# Patient Record
Sex: FEMALE | Race: WHITE | NOT HISPANIC OR LATINO | Employment: OTHER | URBAN - METROPOLITAN AREA
[De-identification: names, ages, dates, MRNs, and addresses within clinical notes are randomized per-mention and may not be internally consistent; named-entity substitution may affect disease eponyms.]

---

## 2017-01-06 RX ORDER — GABAPENTIN 300 MG/1
CAPSULE ORAL
Qty: 270 CAPSULE | Refills: 0 | Status: SHIPPED | OUTPATIENT
Start: 2017-01-06 | End: 2017-04-08 | Stop reason: SDUPTHER

## 2017-01-26 DIAGNOSIS — E79.0 HYPERURICEMIA: ICD-10-CM

## 2017-01-26 DIAGNOSIS — E03.8 OTHER SPECIFIED HYPOTHYROIDISM: ICD-10-CM

## 2017-01-26 DIAGNOSIS — E78.49 OTHER HYPERLIPIDEMIA: Primary | ICD-10-CM

## 2017-01-26 DIAGNOSIS — F41.9 ANXIETY: ICD-10-CM

## 2017-01-26 DIAGNOSIS — E55.9 VITAMIN D DEFICIENCY: Primary | ICD-10-CM

## 2017-01-26 DIAGNOSIS — IMO0002 UNCONTROLLED TYPE 2 DIABETES MELLITUS WITH OTHER SPECIFIED COMPLICATION: ICD-10-CM

## 2017-01-26 DIAGNOSIS — E11.69 TYPE 2 DIABETES MELLITUS WITH OTHER SPECIFIED COMPLICATION (HCC): ICD-10-CM

## 2017-01-26 DIAGNOSIS — R79.89 LFT ELEVATION: ICD-10-CM

## 2017-02-02 ENCOUNTER — CONVERSION ENCOUNTER (OUTPATIENT)
Dept: ENDOCRINOLOGY | Age: 67
End: 2017-02-02

## 2017-02-02 ENCOUNTER — LAB (OUTPATIENT)
Dept: ENDOCRINOLOGY | Age: 67
End: 2017-02-02

## 2017-02-02 DIAGNOSIS — F41.9 ANXIETY: ICD-10-CM

## 2017-02-02 DIAGNOSIS — E03.8 OTHER SPECIFIED HYPOTHYROIDISM: ICD-10-CM

## 2017-02-02 DIAGNOSIS — IMO0002 UNCONTROLLED TYPE 2 DIABETES MELLITUS WITH OTHER SPECIFIED COMPLICATION: ICD-10-CM

## 2017-02-02 DIAGNOSIS — R79.89 LFT ELEVATION: ICD-10-CM

## 2017-02-02 DIAGNOSIS — E11.69 TYPE 2 DIABETES MELLITUS WITH OTHER SPECIFIED COMPLICATION (HCC): ICD-10-CM

## 2017-02-02 DIAGNOSIS — E78.49 OTHER HYPERLIPIDEMIA: ICD-10-CM

## 2017-02-02 DIAGNOSIS — E55.9 VITAMIN D DEFICIENCY: ICD-10-CM

## 2017-02-02 DIAGNOSIS — E79.0 HYPERURICEMIA: ICD-10-CM

## 2017-02-03 LAB
25(OH)D3+25(OH)D2 SERPL-MCNC: 35.4 NG/ML (ref 30–100)
MICROALBUMIN UR-MCNC: 15.3 UG/ML

## 2017-02-04 LAB
ALBUMIN SERPL-MCNC: 4.8 G/DL (ref 3.5–5.2)
ALBUMIN/GLOB SERPL: 1.8 G/DL
ALP SERPL-CCNC: 92 U/L (ref 39–117)
ALT SERPL-CCNC: 40 U/L (ref 1–33)
AST SERPL-CCNC: 27 U/L (ref 1–32)
BILIRUB SERPL-MCNC: 0.5 MG/DL (ref 0.1–1.2)
BUN SERPL-MCNC: 12 MG/DL (ref 8–23)
BUN/CREAT SERPL: 20.7 (ref 7–25)
C PEPTIDE SERPL-MCNC: 8.2 NG/ML (ref 1.1–4.4)
CALCIUM SERPL-MCNC: 9.7 MG/DL (ref 8.6–10.5)
CHLORIDE SERPL-SCNC: 102 MMOL/L (ref 98–107)
CHOLEST SERPL-MCNC: 209 MG/DL (ref 0–200)
CO2 SERPL-SCNC: 29.3 MMOL/L (ref 22–29)
CREAT SERPL-MCNC: 0.58 MG/DL (ref 0.57–1)
GLOBULIN SER CALC-MCNC: 2.6 GM/DL
GLUCOSE SERPL-MCNC: 131 MG/DL (ref 65–99)
HBA1C MFR BLD: 6.13 % (ref 4.8–5.6)
HDLC SERPL-MCNC: 47 MG/DL (ref 40–60)
LDLC SERPL CALC-MCNC: 116 MG/DL (ref 0–100)
POTASSIUM SERPL-SCNC: 4.2 MMOL/L (ref 3.5–5.2)
PROT SERPL-MCNC: 7.4 G/DL (ref 6–8.5)
SODIUM SERPL-SCNC: 143 MMOL/L (ref 136–145)
T3FREE SERPL-MCNC: 3.7 PG/ML (ref 2–4.4)
T4 FREE SERPL-MCNC: 1.71 NG/DL (ref 0.93–1.7)
T4 SERPL-MCNC: 9.81 MCG/DL (ref 4.5–11.7)
THYROGLOB AB SERPL-ACNC: <1 IU/ML
THYROGLOB SERPL-MCNC: 7.6 NG/ML
THYROGLOB SERPL-MCNC: NORMAL NG/ML
TRIGL SERPL-MCNC: 228 MG/DL (ref 0–150)
TSH SERPL DL<=0.005 MIU/L-ACNC: 0.38 MIU/ML (ref 0.27–4.2)
URATE SERPL-MCNC: 6.8 MG/DL (ref 2.4–5.7)
VLDLC SERPL CALC-MCNC: 45.6 MG/DL (ref 5–40)

## 2017-02-08 RX ORDER — DULOXETIN HYDROCHLORIDE 60 MG/1
CAPSULE, DELAYED RELEASE ORAL
Qty: 90 CAPSULE | Refills: 0 | Status: SHIPPED | OUTPATIENT
Start: 2017-02-08 | End: 2017-05-11 | Stop reason: SDUPTHER

## 2017-02-08 RX ORDER — LEVOTHYROXINE SODIUM 125 MCG
TABLET ORAL
Qty: 90 TABLET | Refills: 0 | Status: SHIPPED | OUTPATIENT
Start: 2017-02-08 | End: 2017-05-18 | Stop reason: SDUPTHER

## 2017-02-14 ENCOUNTER — OFFICE VISIT (OUTPATIENT)
Dept: ENDOCRINOLOGY | Age: 67
End: 2017-02-14

## 2017-02-14 VITALS
SYSTOLIC BLOOD PRESSURE: 128 MMHG | BODY MASS INDEX: 32.82 KG/M2 | DIASTOLIC BLOOD PRESSURE: 86 MMHG | RESPIRATION RATE: 16 BRPM | HEIGHT: 65 IN | WEIGHT: 197 LBS

## 2017-02-14 DIAGNOSIS — R53.82 CHRONIC FATIGUE: ICD-10-CM

## 2017-02-14 DIAGNOSIS — IMO0002 UNCONTROLLED TYPE 2 DIABETES MELLITUS WITH OTHER SPECIFIED COMPLICATION: Primary | ICD-10-CM

## 2017-02-14 DIAGNOSIS — E79.0 HYPERURICEMIA: ICD-10-CM

## 2017-02-14 DIAGNOSIS — E55.9 VITAMIN D DEFICIENCY: ICD-10-CM

## 2017-02-14 DIAGNOSIS — E78.49 OTHER HYPERLIPIDEMIA: ICD-10-CM

## 2017-02-14 DIAGNOSIS — I10 ESSENTIAL HYPERTENSION: ICD-10-CM

## 2017-02-14 PROCEDURE — 99214 OFFICE O/P EST MOD 30 MIN: CPT | Performed by: INTERNAL MEDICINE

## 2017-02-14 RX ORDER — ATORVASTATIN CALCIUM 20 MG/1
80 TABLET, FILM COATED ORAL DAILY
COMMUNITY
End: 2017-07-13

## 2017-02-14 RX ORDER — ALLOPURINOL 100 MG/1
100 TABLET ORAL DAILY
Qty: 30 TABLET | Refills: 5 | Status: SHIPPED | OUTPATIENT
Start: 2017-02-14 | End: 2017-07-13 | Stop reason: SDUPTHER

## 2017-02-14 RX ORDER — ERGOCALCIFEROL 1.25 MG/1
50000 CAPSULE ORAL
Qty: 13 CAPSULE | Refills: 3 | Status: SHIPPED | OUTPATIENT
Start: 2017-02-14 | End: 2018-03-04 | Stop reason: SDUPTHER

## 2017-02-14 NOTE — PROGRESS NOTES
"Subjective   Jose Keys is a 66 y.o. female seen for follow up for DM2, hyperlipidemia, hypothyroidism, vit d deficiency, lab review. Patient is checking BG only as needed. She denies any problems or concerns.     History of Present Illness 's is a 66-year-old female known patient with type II diabetes hypertension dyslipidemia hypothyroidism as well as vitamin D deficiency.  Over the course of last 6 months she has had no significant health problems for which to go to the emergency room or hospital.  Visit Vitals   • /86   • Resp 16   • Ht 65\" (165.1 cm)   • Wt 197 lb (89.4 kg)   • BMI 32.78 kg/m2     Allergies   Allergen Reactions   • Azithromycin    • Erythromycin    • Ezetimibe-Simvastatin    • Hydrocodone-Acetaminophen    • Ropinirole Hcl    • Simvastatin    • Statins    • Sulfur    • Zetia [Ezetimibe]        Current Outpatient Prescriptions:   •  amLODIPine-valsartan (EXFORGE) 5-320 MG per tablet, Take 1 tablet by mouth daily., Disp: , Rfl:   •  atorvastatin (LIPITOR) 20 MG tablet, Take 80 mg by mouth Daily., Disp: , Rfl:   •  DULoxetine (CYMBALTA) 60 MG capsule, TAKE ONE CAPSULE BY MOUTH EVERY DAY, Disp: 90 capsule, Rfl: 0  •  Fenofibrate (LIPOFEN) 150 MG capsule, Take 150 mg by mouth daily., Disp: , Rfl:   •  gabapentin (NEURONTIN) 300 MG capsule, TAKE 1 CAPSULE BY MOUTH THREE TIMES DAILY, Disp: 270 capsule, Rfl: 0  •  LEVOCETIRIZINE DIHYDROCHLORIDE PO, Take 5 mg by mouth daily., Disp: , Rfl:   •  metFORMIN (GLUCOPHAGE) 1000 MG tablet, Take 1 tablet by mouth 2 (two) times a day with meals., Disp: 60 tablet, Rfl: 0  •  Multiple Vitamins-Minerals (MULTIVITAMIN PO), Take 1 tablet by mouth daily., Disp: , Rfl:   •  Multiple Vitamins-Minerals (OCUVITE PO), Take 1 tablet by mouth daily., Disp: , Rfl:   •  SYNTHROID 125 MCG tablet, TAKE 1 TABLET DAILY AS DIRECTED, Disp: 90 tablet, Rfl: 0  •  vitamin D (ERGOCALCIFEROL) 73101 UNITS capsule capsule, Take 1 capsule by mouth every 7 days., Disp: 13 capsule, Rfl: " 3          The following portions of the patient's history were reviewed and updated as appropriate: allergies, current medications, past family history, past medical history, past social history, past surgical history and problem list.    Review of Systems   Constitutional: Negative.    HENT: Negative.    Eyes: Negative.    Respiratory: Negative.    Cardiovascular: Negative.    Gastrointestinal: Negative.    Endocrine: Negative.    Genitourinary: Negative.    Musculoskeletal: Negative.    Skin: Negative.    Allergic/Immunologic: Negative.    Neurological: Negative.    Hematological: Negative.    Psychiatric/Behavioral: Negative.        Objective   Physical Exam   Constitutional: She is oriented to person, place, and time. She appears well-developed and well-nourished. No distress.   HENT:   Head: Normocephalic and atraumatic.   Right Ear: External ear normal.   Left Ear: External ear normal.   Nose: Nose normal.   Mouth/Throat: Oropharynx is clear and moist. No oropharyngeal exudate.   Eyes: Conjunctivae and EOM are normal. Pupils are equal, round, and reactive to light. Right eye exhibits no discharge. Left eye exhibits no discharge. No scleral icterus.   Neck: Trachea normal, normal range of motion and full passive range of motion without pain. Neck supple. No JVD present. No tracheal tenderness present. Carotid bruit is not present. No tracheal deviation, no edema and no erythema present. No thyroid mass and no thyromegaly present.   Cardiovascular: Normal rate, regular rhythm, normal heart sounds and intact distal pulses.  Exam reveals no gallop and no friction rub.    No murmur heard.  Pulmonary/Chest: Effort normal and breath sounds normal. No stridor. No respiratory distress. She has no wheezes. She has no rales. She exhibits no tenderness.   Abdominal: Soft. Bowel sounds are normal. She exhibits no distension and no mass. There is no tenderness. There is no rebound and no guarding. No hernia.    Musculoskeletal: Normal range of motion. She exhibits no edema, tenderness or deformity.   Lymphadenopathy:     She has no cervical adenopathy.   Neurological: She is alert and oriented to person, place, and time. She has normal reflexes. She displays normal reflexes. No cranial nerve deficit. She exhibits normal muscle tone. Coordination normal.   Skin: Skin is warm and dry. No rash noted. She is not diaphoretic. No erythema. No pallor.   Psychiatric: She has a normal mood and affect. Her behavior is normal. Judgment and thought content normal.   Nursing note and vitals reviewed.     Results for orders placed or performed in visit on 02/02/17   Lipid Panel   Result Value Ref Range    Total Cholesterol 209 (H) 0 - 200 mg/dL    Triglycerides 228 (H) 0 - 150 mg/dL    HDL Cholesterol 47 40 - 60 mg/dL    VLDL Cholesterol 45.6 (H) 5 - 40 mg/dL    LDL Cholesterol  116 (H) 0 - 100 mg/dL   T4, Free   Result Value Ref Range    Free T4 1.71 (H) 0.93 - 1.70 ng/dL   T4 & TSH ([facility])   Result Value Ref Range    TSH 0.383 0.270 - 4.200 mIU/mL    T4, Total 9.81 4.50 - 11.70 mcg/dL   T3, Free   Result Value Ref Range    T3, Free 3.7 2.0 - 4.4 pg/mL   Comprehensive Metabolic Panel   Result Value Ref Range    Glucose 131 (H) 65 - 99 mg/dL    BUN 12 8 - 23 mg/dL    Creatinine 0.58 0.57 - 1.00 mg/dL    eGFR Non African Am 104 >60 mL/min/1.73    eGFR African Am 126 >60 mL/min/1.73    BUN/Creatinine Ratio 20.7 7.0 - 25.0    Sodium 143 136 - 145 mmol/L    Potassium 4.2 3.5 - 5.2 mmol/L    Chloride 102 98 - 107 mmol/L    Total CO2 29.3 (H) 22.0 - 29.0 mmol/L    Calcium 9.7 8.6 - 10.5 mg/dL    Total Protein 7.4 6.0 - 8.5 g/dL    Albumin 4.80 3.50 - 5.20 g/dL    Globulin 2.6 gm/dL    A/G Ratio 1.8 g/dL    Total Bilirubin 0.5 0.1 - 1.2 mg/dL    Alkaline Phosphatase 92 39 - 117 U/L    AST (SGOT) 27 1 - 32 U/L    ALT (SGPT) 40 (H) 1 - 33 U/L   C-Peptide   Result Value Ref Range    C-Peptide 8.2 (H) 1.1 - 4.4 ng/mL   Hemoglobin A1c   Result  Value Ref Range    Hemoglobin A1C 6.13 (H) 4.80 - 5.60 %   Uric Acid   Result Value Ref Range    Uric Acid 6.8 (H) 2.4 - 5.7 mg/dL   Comprehensive Thyroglobulin   Result Value Ref Range    Thyroglobulin Ab <1.0 IU/mL    Thyroglobulin 7.6 ng/mL    Thyroglobulin (TG-HARSH) Comment ng/mL           Assessment/Plan   Diagnoses and all orders for this visit:    Uncontrolled type 2 diabetes mellitus with other specified complication  -     T3, Free; Future  -     T4 & TSH ([facility]); Future  -     T4, Free; Future  -     Thyroglobulin With Anti-TG; Future  -     Uric Acid; Future  -     Vitamin D 25 Hydroxy; Future  -     Comprehensive Metabolic Panel; Future  -     C-Peptide; Future  -     Hemoglobin A1c; Future  -     Lipid Panel; Future  -     MicroAlbumin, Urine, Random; Future    Other hyperlipidemia  -     T3, Free; Future  -     T4 & TSH ([facility]); Future  -     T4, Free; Future  -     Thyroglobulin With Anti-TG; Future  -     Uric Acid; Future  -     Vitamin D 25 Hydroxy; Future  -     Comprehensive Metabolic Panel; Future  -     C-Peptide; Future  -     Hemoglobin A1c; Future  -     Lipid Panel; Future  -     MicroAlbumin, Urine, Random; Future    Essential hypertension  -     T3, Free; Future  -     T4 & TSH ([facility]); Future  -     T4, Free; Future  -     Thyroglobulin With Anti-TG; Future  -     Uric Acid; Future  -     Vitamin D 25 Hydroxy; Future  -     Comprehensive Metabolic Panel; Future  -     C-Peptide; Future  -     Hemoglobin A1c; Future  -     Lipid Panel; Future  -     MicroAlbumin, Urine, Random; Future    Vitamin D deficiency  -     T3, Free; Future  -     T4 & TSH ([facility]); Future  -     T4, Free; Future  -     Thyroglobulin With Anti-TG; Future  -     Uric Acid; Future  -     Vitamin D 25 Hydroxy; Future  -     Comprehensive Metabolic Panel; Future  -     C-Peptide; Future  -     Hemoglobin A1c; Future  -     Lipid Panel; Future  -     MicroAlbumin, Urine, Random; Future    Hyperuricemia  -     T3,  Free; Future  -     T4 & TSH ([facility]); Future  -     T4, Free; Future  -     Thyroglobulin With Anti-TG; Future  -     Uric Acid; Future  -     Vitamin D 25 Hydroxy; Future  -     Comprehensive Metabolic Panel; Future  -     C-Peptide; Future  -     Hemoglobin A1c; Future  -     Lipid Panel; Future  -     MicroAlbumin, Urine, Random; Future    Chronic fatigue  -     T3, Free; Future  -     T4 & TSH ([facility]); Future  -     T4, Free; Future  -     Thyroglobulin With Anti-TG; Future  -     Uric Acid; Future  -     Vitamin D 25 Hydroxy; Future  -     Comprehensive Metabolic Panel; Future  -     C-Peptide; Future  -     Hemoglobin A1c; Future  -     Lipid Panel; Future  -     MicroAlbumin, Urine, Random; Future    Other orders  -     allopurinol (ZYLOPRIM) 100 MG tablet; Take 1 tablet by mouth Daily.  -     vitamin D (ERGOCALCIFEROL) 26313 UNITS capsule capsule; Take 1 capsule by mouth Every 7 (Seven) Days.              his summary I saw and examined this 66-year-old female for above-mentioned problems.  I reviewed her laboratory evaluation of 02/02/2017 and provided her with a hard copy of it.  She is clinically and metabolically stable and therefore we will go ahead and continue all her current prescriptions.  She will see Ms. Analisa Shaikh in 4 months with laboratory evaluation prior to that office visit.

## 2017-02-25 ENCOUNTER — OFFICE VISIT (OUTPATIENT)
Dept: RETAIL CLINIC | Facility: CLINIC | Age: 67
End: 2017-02-25

## 2017-02-25 VITALS
RESPIRATION RATE: 18 BRPM | OXYGEN SATURATION: 98 % | TEMPERATURE: 98 F | SYSTOLIC BLOOD PRESSURE: 124 MMHG | HEART RATE: 100 BPM | DIASTOLIC BLOOD PRESSURE: 70 MMHG

## 2017-02-25 DIAGNOSIS — R50.9 FEVER, UNSPECIFIED FEVER CAUSE: Primary | ICD-10-CM

## 2017-02-25 DIAGNOSIS — J40 BRONCHITIS: ICD-10-CM

## 2017-02-25 LAB
EXPIRATION DATE: NORMAL
FLUAV AG NPH QL: NORMAL
FLUBV AG NPH QL: NORMAL
INTERNAL CONTROL: NORMAL
Lab: NORMAL

## 2017-02-25 PROCEDURE — 99213 OFFICE O/P EST LOW 20 MIN: CPT | Performed by: NURSE PRACTITIONER

## 2017-02-25 PROCEDURE — 87804 INFLUENZA ASSAY W/OPTIC: CPT | Performed by: NURSE PRACTITIONER

## 2017-02-25 RX ORDER — DOXYCYCLINE 100 MG/1
100 CAPSULE ORAL 2 TIMES DAILY
Qty: 10 CAPSULE | Refills: 0 | Status: SHIPPED | OUTPATIENT
Start: 2017-02-25 | End: 2017-04-24

## 2017-02-25 RX ORDER — ALBUTEROL SULFATE 90 UG/1
2 AEROSOL, METERED RESPIRATORY (INHALATION) EVERY 4 HOURS PRN
Qty: 1 INHALER | Refills: 0 | Status: SHIPPED | OUTPATIENT
Start: 2017-02-25 | End: 2017-03-07

## 2017-02-25 RX ORDER — PREDNISONE 20 MG/1
20 TABLET ORAL 2 TIMES DAILY
Qty: 10 TABLET | Refills: 0 | Status: SHIPPED | OUTPATIENT
Start: 2017-02-25 | End: 2017-04-24

## 2017-02-25 RX ORDER — BENZONATATE 100 MG/1
CAPSULE ORAL
Qty: 30 CAPSULE | Refills: 0 | Status: SHIPPED | OUTPATIENT
Start: 2017-02-25 | End: 2017-04-24

## 2017-02-25 NOTE — PATIENT INSTRUCTIONS
Acute Bronchitis  Bronchitis is inflammation of the airways that extend from the windpipe into the lungs (bronchi). The inflammation often causes mucus to develop. This leads to a cough, which is the most common symptom of bronchitis.   In acute bronchitis, the condition usually develops suddenly and goes away over time, usually in a couple weeks. Smoking, allergies, and asthma can make bronchitis worse. Repeated episodes of bronchitis may cause further lung problems.   CAUSES  Acute bronchitis is most often caused by the same virus that causes a cold. The virus can spread from person to person (contagious) through coughing, sneezing, and touching contaminated objects.  SIGNS AND SYMPTOMS   · Cough.    · Fever.    · Coughing up mucus.    · Body aches.    · Chest congestion.    · Chills.    · Shortness of breath.    · Sore throat.    DIAGNOSIS   Acute bronchitis is usually diagnosed through a physical exam. Your health care provider will also ask you questions about your medical history. Tests, such as chest X-rays, are sometimes done to rule out other conditions.   TREATMENT   Acute bronchitis usually goes away in a couple weeks. Oftentimes, no medical treatment is necessary. Medicines are sometimes given for relief of fever or cough. Antibiotic medicines are usually not needed but may be prescribed in certain situations. In some cases, an inhaler may be recommended to help reduce shortness of breath and control the cough. A cool mist vaporizer may also be used to help thin bronchial secretions and make it easier to clear the chest.   HOME CARE INSTRUCTIONS  · Get plenty of rest.    · Drink enough fluids to keep your urine clear or pale yellow (unless you have a medical condition that requires fluid restriction). Increasing fluids may help thin your respiratory secretions (sputum) and reduce chest congestion, and it will prevent dehydration.    · Take medicines only as directed by your health care provider.  · If  you were prescribed an antibiotic medicine, finish it all even if you start to feel better.  · Avoid smoking and secondhand smoke. Exposure to cigarette smoke or irritating chemicals will make bronchitis worse. If you are a smoker, consider using nicotine gum or skin patches to help control withdrawal symptoms. Quitting smoking will help your lungs heal faster.    · Reduce the chances of another bout of acute bronchitis by washing your hands frequently, avoiding people with cold symptoms, and trying not to touch your hands to your mouth, nose, or eyes.    · Keep all follow-up visits as directed by your health care provider.    SEEK MEDICAL CARE IF:  Your symptoms do not improve after 1 week of treatment.   SEEK IMMEDIATE MEDICAL CARE IF:  · You develop an increased fever or chills.    · You have chest pain.    · You have severe shortness of breath.  · You have bloody sputum.    · You develop dehydration.  · You faint or repeatedly feel like you are going to pass out.  · You develop repeated vomiting.  · You develop a severe headache.  MAKE SURE YOU:   · Understand these instructions.  · Will watch your condition.  · Will get help right away if you are not doing well or get worse.     This information is not intended to replace advice given to you by your health care provider. Make sure you discuss any questions you have with your health care provider.     Document Released: 01/25/2006 Document Revised: 01/08/2016 Document Reviewed: 06/10/2014  [employer] Patient Education ©2016 [employer]

## 2017-02-25 NOTE — PROGRESS NOTES
Subjective:     Jose Keys is a 66 y.o.     Influenza   This is a new problem. The current episode started 1 to 4 weeks ago. Associated symptoms include chills, coughing, diaphoresis, fatigue, a fever, headaches, myalgias and a sore throat. Pertinent negatives include no congestion, nausea or vomiting. She has tried drinking (benadryl, mucinex, tylenol) for the symptoms. Improvement on treatment: minimal.         The following portions of the patient's history were reviewed and updated as appropriate: allergies, current medications, past family history, past medical history, past social history, past surgical history and problem list.      Review of Systems   Constitutional: Positive for chills, diaphoresis, fatigue and fever.   HENT: Positive for sore throat. Negative for congestion.    Respiratory: Positive for cough, shortness of breath and wheezing.    Cardiovascular:        Hx: hypertension   Gastrointestinal: Positive for diarrhea. Negative for nausea and vomiting.   Musculoskeletal: Positive for myalgias.   Skin: Negative for color change and pallor.   Allergic/Immunologic: Negative for environmental allergies.   Neurological: Positive for dizziness (mild), light-headedness (mild) and headaches.         Objective:      Physical Exam   Constitutional: She is oriented to person, place, and time. She appears well-developed and well-nourished. She is cooperative.   Coughing intermittently throughout visit   HENT:   Head: Normocephalic and atraumatic.   Right Ear: Tympanic membrane and ear canal normal.   Left Ear: Tympanic membrane and ear canal normal.   Nose: Nose normal.   Mouth/Throat: No oropharyngeal exudate or posterior oropharyngeal erythema.   Eyes: EOM and lids are normal. Pupils are equal, round, and reactive to light. Right eye conjunctiva injected: mild. Left eye conjunctiva injected: mild.   Neck: Normal range of motion. Neck supple.   Cardiovascular: Normal rate, regular rhythm, S1 normal, S2  normal and normal heart sounds.    Pulmonary/Chest: Effort normal. Decreased breath sounds: mildly decreased breath sounds. She has rhonchi (clear with cough) in the right upper field and the left upper field.   Abdominal: Soft. Normal appearance and bowel sounds are normal. There is no tenderness.   Musculoskeletal: Normal range of motion.   Lymphadenopathy:     She has no cervical adenopathy.   Neurological: She is alert and oriented to person, place, and time.   Skin: Skin is warm, dry and intact.   Psychiatric: She has a normal mood and affect. Her speech is normal and behavior is normal.   Vitals reviewed.          Jose was seen today for influenza.    Diagnoses and all orders for this visit:    Fever, unspecified fever cause  -     POC Influenza A / B    Bronchitis    Other orders  -     doxycycline (MONODOX) 100 MG capsule; Take 1 capsule by mouth 2 (Two) Times a Day.  -     benzonatate (TESSALON PERLES) 100 MG capsule; 1 to 2 capsules 3 times a day  -     albuterol (PROVENTIL HFA;VENTOLIN HFA) 108 (90 BASE) MCG/ACT inhaler; Inhale 2 puffs Every 4 (Four) Hours As Needed for wheezing for up to 10 days.  -     predniSONE (DELTASONE) 20 MG tablet; Take 1 tablet by mouth 2 (Two) Times a Day.

## 2017-04-10 RX ORDER — GABAPENTIN 300 MG/1
CAPSULE ORAL
Qty: 270 CAPSULE | Refills: 0 | Status: SHIPPED | OUTPATIENT
Start: 2017-04-10 | End: 2017-07-12 | Stop reason: SDUPTHER

## 2017-04-24 PROBLEM — R00.2 PALPITATIONS: Status: ACTIVE | Noted: 2017-04-14

## 2017-04-24 PROBLEM — R07.9 CHEST PAIN: Status: ACTIVE | Noted: 2017-04-13

## 2017-04-25 ENCOUNTER — OFFICE VISIT (OUTPATIENT)
Dept: FAMILY MEDICINE CLINIC | Facility: CLINIC | Age: 67
End: 2017-04-25

## 2017-04-25 VITALS
BODY MASS INDEX: 32.99 KG/M2 | RESPIRATION RATE: 16 BRPM | DIASTOLIC BLOOD PRESSURE: 66 MMHG | TEMPERATURE: 98.5 F | OXYGEN SATURATION: 97 % | SYSTOLIC BLOOD PRESSURE: 110 MMHG | HEART RATE: 72 BPM | WEIGHT: 198 LBS | HEIGHT: 65 IN

## 2017-04-25 DIAGNOSIS — I10 ESSENTIAL HYPERTENSION: Primary | ICD-10-CM

## 2017-04-25 DIAGNOSIS — Z13.820 SCREENING FOR OSTEOPOROSIS: ICD-10-CM

## 2017-04-25 DIAGNOSIS — Z00.00 ANNUAL PHYSICAL EXAM: ICD-10-CM

## 2017-04-25 PROCEDURE — G0402 INITIAL PREVENTIVE EXAM: HCPCS | Performed by: FAMILY MEDICINE

## 2017-04-25 PROCEDURE — 99213 OFFICE O/P EST LOW 20 MIN: CPT | Performed by: FAMILY MEDICINE

## 2017-04-25 RX ORDER — CARVEDILOL 3.12 MG/1
3.12 TABLET ORAL 2 TIMES DAILY WITH MEALS
Qty: 180 TABLET | Refills: 1 | Status: SHIPPED | OUTPATIENT
Start: 2017-04-25

## 2017-04-25 RX ORDER — AMLODIPINE AND VALSARTAN 10; 320 MG/1; MG/1
1 TABLET ORAL DAILY
Qty: 90 TABLET | Refills: 1 | Status: SHIPPED | OUTPATIENT
Start: 2017-04-25 | End: 2020-11-19 | Stop reason: SDUPTHER

## 2017-04-25 RX ORDER — AMLODIPINE AND VALSARTAN 10; 320 MG/1; MG/1
1 TABLET ORAL DAILY
COMMUNITY
End: 2017-04-25 | Stop reason: SDUPTHER

## 2017-04-25 RX ORDER — CARVEDILOL 3.12 MG/1
3.12 TABLET ORAL 2 TIMES DAILY WITH MEALS
COMMUNITY
End: 2017-04-25 | Stop reason: SDUPTHER

## 2017-04-25 NOTE — PROGRESS NOTES
"Subjective   Jose Keys is a 66 y.o. female.     CC: Hospital F/U for Chest Pain    History of Present Illness     Pt returns after being seen a Zhang'zafar Munsonn 4/14-4/14 for SSCP that had a negative w/u (ECHO, Stress Test, and Holter). Her BP meds were adjusted and she is here in f/u.    Current medication list is compared to recent hospital d/c and the medications are reconciled.  The following portions of the patient's history were reviewed and updated as appropriate: allergies, current medications, past family history, past medical history, past social history, past surgical history and problem list.    Review of Systems   Constitutional: Negative for activity change, chills, fatigue and fever.   Respiratory: Negative for cough and chest tightness.    Cardiovascular: Negative for chest pain and palpitations.   Gastrointestinal: Negative for abdominal pain and nausea.   Endocrine: Negative for cold intolerance.   Psychiatric/Behavioral: Negative for behavioral problems and dysphoric mood.     /66  Pulse 72  Temp 98.5 °F (36.9 °C) (Oral)   Resp 16  Ht 65\" (165.1 cm)  Wt 198 lb (89.8 kg)  SpO2 97%  BMI 32.95 kg/m2    Objective   Physical Exam   Constitutional: She appears well-developed and well-nourished.   Neck: Neck supple. No thyromegaly present.   Cardiovascular: Normal rate and regular rhythm.    No murmur heard.  Pulmonary/Chest: Effort normal and breath sounds normal.   Abdominal: Bowel sounds are normal.   Psychiatric: She has a normal mood and affect. Her behavior is normal.   Nursing note and vitals reviewed.  Hospital records reviewed with pt confirming HPI.      Assessment/Plan   Jose was seen today for chest pain, hypertension and hyperlipidemia.    Diagnoses and all orders for this visit:    Essential hypertension  -     amLODIPine-valsartan (EXFORGE)  MG per tablet; Take 1 tablet by mouth Daily.  -     carvedilol (COREG) 3.125 MG tablet; Take 1 tablet by mouth 2 (Two) Times a " Day With Meals.    Annual physical exam    Screening for osteoporosis  -     DEXA Bone Density Axial

## 2017-04-25 NOTE — PATIENT INSTRUCTIONS
Medicare Wellness  Personal Prevention Plan of Service     Date of Office Visit:  2017  Encounter Provider:  James Lau MD  Place of Service:  [facility]  Patient Name: Jose Keys  :  1950    As part of the Medicare Wellness portion of your visit today, we are providing you with this personalized preventive plan of services (PPPS). This plan is based upon recommendations of the [place] Preventive Services Task Force (USPSTF) and the Advisory Committee on Immunization Practices (ACIP).    This lists the preventive care services that should be considered, and provides dates of when you are due. Items listed as completed are up-to-date and do not require any further intervention.    Health Maintenance   Topic Date Due   • MEDICARE ANNUAL WELLNESS  2017   • DIABETIC FOOT EXAM  2017   • DIABETIC EYE EXAM  2017   • TDAP/TD VACCINES (1 - Tdap) 05/10/2017 (Originally 1969)   • PNEUMOCOCCAL VACCINES (65+ LOW/MEDIUM RISK) (1 of 2 - PCV13) 2017 (Originally 2016)   • ZOSTER VACCINE  2017 (Originally 2017)   • PAP SMEAR  2017 (Originally 2017)   • INFLUENZA VACCINE  2017 (Originally 2016)   • HEMOGLOBIN A1C  2017   • LIPID PANEL  2018   • URINE MICROALBUMIN  2018   • MAMMOGRAM  2019   • COLONOSCOPY  2023   • HEPATITIS C SCREENING  Excluded       Orders Placed This Encounter   Procedures   • DEXA Bone Density Axial     Order Specific Question:   Reason for Exam:     Answer:   postmenopausal       Return in about 6 months (around 10/25/2017) for Recheck.

## 2017-04-25 NOTE — PROGRESS NOTES
QUICK REFERENCE INFORMATION:  The ABCs of the Annual Wellness Visit    Initial Medicare Wellness Visit    HEALTH RISK ASSESSMENT    1950    Recent Hospitalizations:  No recent hospitalization(s)..        Current Medical Providers:  Patient Care Team:  James Lau MD as PCP - General        Smoking Status:  History   Smoking Status   • Never Smoker   Smokeless Tobacco   • Not on file       Alcohol Consumption:  History   Alcohol Use   • Yes     Comment: socially        Depression Screen:   PHQ-9 Depression Screening 4/25/2017   Little interest or pleasure in doing things 0   Feeling down, depressed, or hopeless 0   Trouble falling or staying asleep, or sleeping too much 0   Feeling tired or having little energy 0   Poor appetite or overeating 0   Feeling bad about yourself - or that you are a failure or have let yourself or your family down 0   Trouble concentrating on things, such as reading the newspaper or watching television 0   Moving or speaking so slowly that other people could have noticed. Or the opposite - being so fidgety or restless that you have been moving around a lot more than usual 0   Thoughts that you would be better off dead, or of hurting yourself in some way 0   PHQ-9 Total Score 0       Health Habits and Functional and Cognitive Screening:  Functional & Cognitive Status 4/25/2017   Do you have difficulty preparing food and eating? No   Do you have difficulty bathing yourself? No   Do you have difficulty getting dressed? No   Do you have difficulty using the toilet? No   Do you have difficulty moving around from place to place? No   In the past year have you fallen or experienced a near fall? No   Do you need help using the phone?  No   Are you deaf or do you have serious difficulty hearing?  No   Do you need help with transportation? No   Do you need help shopping? No   Do you need help preparing meals?  No   Do you need help with housework?  No   Do you need help with laundry? No   Do  you need help taking your medications? No   Do you need help managing money? No   Do you have difficulty concentrating, remembering or making decisions? No       Health Habits  Current Diet: Limited Junk Food  Dental Exam: Up to date  Eye Exam: Up to date  Exercise (times per week): 3 times per week  Current Exercise Activities Include: Walking    Patient questioned in depth regarding fall risks and prevention (i.e.removing rugs that may slip, etc.) during visit today. They deny prior falls, gait, or balance issues today.        Does the patient have evidence of cognitive impairment? No    Asiprin use counseling: Taking ASA appropriately as indicated      Recent Lab Results:    Visual Acuity:  No exam data present    Age-appropriate Screening Schedule:  Refer to the list below for future screening recommendations based on patient's age, sex and/or medical conditions. Orders for these recommended tests are listed in the plan section. The patient has been provided with a written plan.    Health Maintenance   Topic Date Due   • DIABETIC FOOT EXAM  04/24/2017   • DIABETIC EYE EXAM  04/24/2017   • TDAP/TD VACCINES (1 - Tdap) 05/10/2017 (Originally 5/7/1969)   • PNEUMOCOCCAL VACCINES (65+ LOW/MEDIUM RISK) (1 of 2 - PCV13) 05/18/2017 (Originally 5/7/2016)   • ZOSTER VACCINE  05/22/2017 (Originally 4/24/2017)   • PAP SMEAR  05/25/2017 (Originally 4/24/2017)   • INFLUENZA VACCINE  11/19/2017 (Originally 8/1/2016)   • DXA SCAN  06/15/2017   • HEMOGLOBIN A1C  08/02/2017   • LIPID PANEL  02/02/2018   • URINE MICROALBUMIN  02/02/2018   • MAMMOGRAM  03/28/2019   • COLONOSCOPY  01/01/2023        Subjective   History of Present Illness    Jose Keys is a 66 y.o. female who presents for an Annual Wellness Visit.    The following portions of the patient's history were reviewed and updated as appropriate: allergies, current medications, past family history, past medical history, past social history, past surgical history and problem  list.    Outpatient Medications Prior to Visit   Medication Sig Dispense Refill   • allopurinol (ZYLOPRIM) 100 MG tablet Take 1 tablet by mouth Daily. 30 tablet 5   • atorvastatin (LIPITOR) 20 MG tablet Take 80 mg by mouth Daily.     • DULoxetine (CYMBALTA) 60 MG capsule TAKE ONE CAPSULE BY MOUTH EVERY DAY 90 capsule 0   • Fenofibrate (LIPOFEN) 150 MG capsule Take 150 mg by mouth daily.     • gabapentin (NEURONTIN) 300 MG capsule TAKE 1 CAPSULE BY MOUTH THREE TIMES DAILY 270 capsule 0   • LEVOCETIRIZINE DIHYDROCHLORIDE PO Take 5 mg by mouth daily.     • metFORMIN (GLUCOPHAGE) 1000 MG tablet Take 1 tablet by mouth 2 (two) times a day with meals. 60 tablet 0   • Multiple Vitamins-Minerals (MULTIVITAMIN PO) Take 1 tablet by mouth daily.     • Multiple Vitamins-Minerals (OCUVITE PO) Take 1 tablet by mouth daily.     • SYNTHROID 125 MCG tablet TAKE 1 TABLET DAILY AS DIRECTED 90 tablet 0   • vitamin D (ERGOCALCIFEROL) 93962 UNITS capsule capsule Take 1 capsule by mouth Every 7 (Seven) Days. 13 capsule 3   • amLODIPine-valsartan (EXFORGE)  MG per tablet Take 1 tablet by mouth Daily.     • amLODIPine-valsartan (EXFORGE) 5-320 MG per tablet Take 1 tablet by mouth daily.     • carvedilol (COREG) 3.125 MG tablet Take 3.125 mg by mouth 2 (Two) Times a Day With Meals.       No facility-administered medications prior to visit.        Patient Active Problem List   Diagnosis   • Abnormal weight gain   • Hyperuricemia   • Diabetic peripheral neuropathy   • Depression   • Fatigue   • Urticaria   • Hyperlipidemia   • Hypertension   • Hypothyroidism   • Menopause present   • Proteinuria   • Uncontrolled type 2 diabetes mellitus   • Vitamin D deficiency   • Anxiety   • Type 2 diabetes mellitus   • LFT elevation   • Neuropathy   • Controlled type 2 diabetes mellitus   • Chest pain   • Palpitations       Advance Care Planning:  has NO advance directive - information provided to the patient today    Identification of Risk  "Factors:  Risk factors include: weight  and cardiovascular risk.    Review of Systems    Compared to one year ago, the patient feels her physical health is the same.  Compared to one year ago, the patient feels her mental health is the same.    Objective     Physical Exam    Vitals:    04/25/17 0921   BP: 110/66   Pulse: 72   Resp: 16   Temp: 98.5 °F (36.9 °C)   TempSrc: Oral   SpO2: 97%   Weight: 198 lb (89.8 kg)   Height: 65\" (165.1 cm)   PainSc: 0-No pain       Body mass index is 32.95 kg/(m^2).  Discussed the patient's BMI with her. The BMI is above average; BMI management plan is completed.    Assessment/Plan   Patient Self-Management and Personalized Health Advice  The patient has been provided with information about: diet, exercise and weight management and preventive services including:   · Exercise counseling provided, Fall Risk assessment done, Nutrition counseling provided.    Visit Diagnoses:    ICD-10-CM ICD-9-CM   1. Essential hypertension I10 401.9   2. Annual physical exam Z00.00 V70.0   3. Screening for osteoporosis Z13.820 V82.81       Orders Placed This Encounter   Procedures   • DEXA Bone Density Axial     Order Specific Question:   Reason for Exam:     Answer:   postmenopausal       Outpatient Encounter Prescriptions as of 4/25/2017   Medication Sig Dispense Refill   • allopurinol (ZYLOPRIM) 100 MG tablet Take 1 tablet by mouth Daily. 30 tablet 5   • amLODIPine-valsartan (EXFORGE)  MG per tablet Take 1 tablet by mouth Daily. 90 tablet 1   • atorvastatin (LIPITOR) 20 MG tablet Take 80 mg by mouth Daily.     • carvedilol (COREG) 3.125 MG tablet Take 1 tablet by mouth 2 (Two) Times a Day With Meals. 180 tablet 1   • DULoxetine (CYMBALTA) 60 MG capsule TAKE ONE CAPSULE BY MOUTH EVERY DAY 90 capsule 0   • Fenofibrate (LIPOFEN) 150 MG capsule Take 150 mg by mouth daily.     • gabapentin (NEURONTIN) 300 MG capsule TAKE 1 CAPSULE BY MOUTH THREE TIMES DAILY 270 capsule 0   • LEVOCETIRIZINE " DIHYDROCHLORIDE PO Take 5 mg by mouth daily.     • metFORMIN (GLUCOPHAGE) 1000 MG tablet Take 1 tablet by mouth 2 (two) times a day with meals. 60 tablet 0   • Multiple Vitamins-Minerals (MULTIVITAMIN PO) Take 1 tablet by mouth daily.     • Multiple Vitamins-Minerals (OCUVITE PO) Take 1 tablet by mouth daily.     • SYNTHROID 125 MCG tablet TAKE 1 TABLET DAILY AS DIRECTED 90 tablet 0   • vitamin D (ERGOCALCIFEROL) 10126 UNITS capsule capsule Take 1 capsule by mouth Every 7 (Seven) Days. 13 capsule 3   • [DISCONTINUED] amLODIPine-valsartan (EXFORGE)  MG per tablet Take 1 tablet by mouth Daily.     • [DISCONTINUED] amLODIPine-valsartan (EXFORGE) 5-320 MG per tablet Take 1 tablet by mouth daily.     • [DISCONTINUED] carvedilol (COREG) 3.125 MG tablet Take 3.125 mg by mouth 2 (Two) Times a Day With Meals.       No facility-administered encounter medications on file as of 4/25/2017.        Reviewed use of high risk medication in the elderly: not applicable  Reviewed for potential of harmful drug interactions in the elderly: not applicable    Follow Up:  Return in about 6 months (around 10/25/2017) for Recheck.     An After Visit Summary and PPPS with all of these plans were given to the patient.

## 2017-05-11 RX ORDER — DULOXETIN HYDROCHLORIDE 60 MG/1
CAPSULE, DELAYED RELEASE ORAL
Qty: 90 CAPSULE | Refills: 0 | Status: SHIPPED | OUTPATIENT
Start: 2017-05-11 | End: 2017-08-10 | Stop reason: SDUPTHER

## 2017-05-18 RX ORDER — LEVOTHYROXINE SODIUM 125 MCG
TABLET ORAL
Qty: 90 TABLET | Refills: 0 | Status: SHIPPED | OUTPATIENT
Start: 2017-05-18 | End: 2017-08-25 | Stop reason: SDUPTHER

## 2017-06-07 ENCOUNTER — RESULTS ENCOUNTER (OUTPATIENT)
Dept: ENDOCRINOLOGY | Age: 67
End: 2017-06-07

## 2017-06-07 DIAGNOSIS — R53.82 CHRONIC FATIGUE: ICD-10-CM

## 2017-06-07 DIAGNOSIS — I10 ESSENTIAL HYPERTENSION: ICD-10-CM

## 2017-06-07 DIAGNOSIS — E55.9 VITAMIN D DEFICIENCY: ICD-10-CM

## 2017-06-07 DIAGNOSIS — IMO0002 UNCONTROLLED TYPE 2 DIABETES MELLITUS WITH OTHER SPECIFIED COMPLICATION: ICD-10-CM

## 2017-06-07 DIAGNOSIS — E78.49 OTHER HYPERLIPIDEMIA: ICD-10-CM

## 2017-06-07 DIAGNOSIS — E79.0 HYPERURICEMIA: ICD-10-CM

## 2017-06-13 DIAGNOSIS — E11.69 TYPE 2 DIABETES MELLITUS WITH OTHER SPECIFIED COMPLICATION (HCC): Primary | ICD-10-CM

## 2017-06-13 DIAGNOSIS — E03.8 OTHER SPECIFIED HYPOTHYROIDISM: ICD-10-CM

## 2017-06-13 DIAGNOSIS — E55.9 VITAMIN D DEFICIENCY: ICD-10-CM

## 2017-06-29 ENCOUNTER — LAB (OUTPATIENT)
Dept: ENDOCRINOLOGY | Age: 67
End: 2017-06-29

## 2017-06-29 DIAGNOSIS — E11.69 TYPE 2 DIABETES MELLITUS WITH OTHER SPECIFIED COMPLICATION (HCC): ICD-10-CM

## 2017-06-29 DIAGNOSIS — E55.9 VITAMIN D DEFICIENCY: ICD-10-CM

## 2017-06-29 DIAGNOSIS — E03.8 OTHER SPECIFIED HYPOTHYROIDISM: ICD-10-CM

## 2017-07-02 LAB
25(OH)D3+25(OH)D2 SERPL-MCNC: 35.6 NG/ML (ref 30–100)
ALBUMIN SERPL-MCNC: 4.6 G/DL (ref 3.5–5.2)
ALBUMIN/GLOB SERPL: 1.5 G/DL
ALP SERPL-CCNC: 91 U/L (ref 39–117)
ALT SERPL-CCNC: 46 U/L (ref 1–33)
AST SERPL-CCNC: 32 U/L (ref 1–32)
BILIRUB SERPL-MCNC: 0.2 MG/DL (ref 0.1–1.2)
BUN SERPL-MCNC: 14 MG/DL (ref 8–23)
BUN/CREAT SERPL: 24.6 (ref 7–25)
C PEPTIDE SERPL-MCNC: 4.5 NG/ML (ref 1.1–4.4)
CALCIUM SERPL-MCNC: 10.1 MG/DL (ref 8.6–10.5)
CHLORIDE SERPL-SCNC: 103 MMOL/L (ref 98–107)
CHOLEST SERPL-MCNC: 268 MG/DL (ref 0–200)
CO2 SERPL-SCNC: 24.8 MMOL/L (ref 22–29)
CREAT SERPL-MCNC: 0.57 MG/DL (ref 0.57–1)
FT4I SERPL CALC-MCNC: 2.6 (ref 1.2–4.9)
GLOBULIN SER CALC-MCNC: 3 GM/DL
GLUCOSE SERPL-MCNC: 105 MG/DL (ref 65–99)
HBA1C MFR BLD: 6.3 % (ref 4.8–5.6)
HDLC SERPL-MCNC: 44 MG/DL (ref 40–60)
LDLC SERPL CALC-MCNC: 177 MG/DL (ref 0–100)
MICROALBUMIN UR-MCNC: 22.3 UG/ML
POTASSIUM SERPL-SCNC: 4.7 MMOL/L (ref 3.5–5.2)
PROT SERPL-MCNC: 7.6 G/DL (ref 6–8.5)
SODIUM SERPL-SCNC: 143 MMOL/L (ref 136–145)
T3FREE SERPL-MCNC: 3.4 PG/ML (ref 2–4.4)
T3RU NFR SERPL: 28 % (ref 24–39)
T4 FREE SERPL-MCNC: 1.69 NG/DL (ref 0.93–1.7)
T4 SERPL-MCNC: 9.3 UG/DL (ref 4.5–12)
THYROGLOB AB SERPL-ACNC: <1 IU/ML
THYROGLOB SERPL-MCNC: 11.2 NG/ML
THYROGLOB SERPL-MCNC: NORMAL NG/ML
TRIGL SERPL-MCNC: 233 MG/DL (ref 0–150)
TSH SERPL DL<=0.005 MIU/L-ACNC: 0.69 UIU/ML (ref 0.45–4.5)
URATE SERPL-MCNC: 6.3 MG/DL (ref 2.4–5.7)
VLDLC SERPL CALC-MCNC: 46.6 MG/DL (ref 5–40)

## 2017-07-12 RX ORDER — GABAPENTIN 300 MG/1
CAPSULE ORAL
Qty: 270 CAPSULE | Refills: 0 | Status: SHIPPED | OUTPATIENT
Start: 2017-07-12 | End: 2017-07-18 | Stop reason: SDUPTHER

## 2017-07-13 ENCOUNTER — OFFICE VISIT (OUTPATIENT)
Dept: ENDOCRINOLOGY | Age: 67
End: 2017-07-13

## 2017-07-13 VITALS
WEIGHT: 197.4 LBS | DIASTOLIC BLOOD PRESSURE: 72 MMHG | HEIGHT: 65 IN | SYSTOLIC BLOOD PRESSURE: 126 MMHG | BODY MASS INDEX: 32.89 KG/M2

## 2017-07-13 DIAGNOSIS — E03.9 HYPOTHYROIDISM, UNSPECIFIED TYPE: ICD-10-CM

## 2017-07-13 DIAGNOSIS — E79.0 HYPERURICEMIA: Primary | ICD-10-CM

## 2017-07-13 DIAGNOSIS — I10 ESSENTIAL HYPERTENSION: ICD-10-CM

## 2017-07-13 DIAGNOSIS — E55.9 VITAMIN D DEFICIENCY: ICD-10-CM

## 2017-07-13 DIAGNOSIS — E78.49 OTHER HYPERLIPIDEMIA: ICD-10-CM

## 2017-07-13 DIAGNOSIS — R53.82 CHRONIC FATIGUE: ICD-10-CM

## 2017-07-13 DIAGNOSIS — E11.42 DIABETIC PERIPHERAL NEUROPATHY (HCC): ICD-10-CM

## 2017-07-13 DIAGNOSIS — E11.8 CONTROLLED TYPE 2 DIABETES MELLITUS WITH COMPLICATION, WITHOUT LONG-TERM CURRENT USE OF INSULIN (HCC): ICD-10-CM

## 2017-07-13 PROCEDURE — 99214 OFFICE O/P EST MOD 30 MIN: CPT | Performed by: NURSE PRACTITIONER

## 2017-07-13 RX ORDER — LEVOCETIRIZINE DIHYDROCHLORIDE 5 MG/1
5 TABLET, FILM COATED ORAL DAILY
Refills: 3 | COMMUNITY
Start: 2017-05-11 | End: 2018-07-19

## 2017-07-13 RX ORDER — ALLOPURINOL 100 MG/1
100 TABLET ORAL DAILY
Qty: 30 TABLET | Refills: 5 | Status: SHIPPED | OUTPATIENT
Start: 2017-07-13 | End: 2018-05-14 | Stop reason: SDUPTHER

## 2017-07-13 RX ORDER — ASPIRIN 81 MG
81 TABLET, DELAYED RELEASE (ENTERIC COATED) ORAL DAILY
Refills: 11 | COMMUNITY
Start: 2017-05-11 | End: 2022-08-24

## 2017-07-13 NOTE — PROGRESS NOTES
"Subjective   Jose Keys is a 67 y.o. female is here today for follow-up.  Chief Complaint   Patient presents with   • Diabetes     recent labs, testing BG only when she feels BG drops, pt did not bring meter   • Hypothyroidism     pt is not taking atovastatin ( muscle aches and pains), allopurinol and fenofibrate   • Vitamin D Deficiency     /72  Ht 65\" (165.1 cm)  Wt 197 lb 6.4 oz (89.5 kg)  BMI 32.85 kg/m2  Current Outpatient Prescriptions on File Prior to Visit   Medication Sig   • amLODIPine-valsartan (EXFORGE)  MG per tablet Take 1 tablet by mouth Daily.   • carvedilol (COREG) 3.125 MG tablet Take 1 tablet by mouth 2 (Two) Times a Day With Meals.   • DULoxetine (CYMBALTA) 60 MG capsule TAKE 1 CAPSULE BY MOUTH EVERY DAY   • gabapentin (NEURONTIN) 300 MG capsule TAKE 1 CAPSULE BY MOUTH THREE TIMES DAILY   • metFORMIN (GLUCOPHAGE) 1000 MG tablet TAKE 1 TABLET BY MOUTH TWICE DAILY FOR DIABETES   • Multiple Vitamins-Minerals (MULTIVITAMIN PO) Take 1 tablet by mouth daily.   • Multiple Vitamins-Minerals (OCUVITE PO) Take 1 tablet by mouth daily.   • SYNTHROID 125 MCG tablet TAKE 1 TABLET BY MOUTH DAILY AS DIRECTED   • vitamin D (ERGOCALCIFEROL) 10173 UNITS capsule capsule Take 1 capsule by mouth Every 7 (Seven) Days.   • allopurinol (ZYLOPRIM) 100 MG tablet Take 1 tablet by mouth Daily.   • Fenofibrate (LIPOFEN) 150 MG capsule Take 150 mg by mouth daily.   • [DISCONTINUED] atorvastatin (LIPITOR) 20 MG tablet Take 80 mg by mouth Daily.   • [DISCONTINUED] LEVOCETIRIZINE DIHYDROCHLORIDE PO Take 5 mg by mouth daily.     No current facility-administered medications on file prior to visit.      Family History   Problem Relation Age of Onset   • Diabetes Father    • Hypertension Father    • Diabetes Sister      Social History   Substance Use Topics   • Smoking status: Never Smoker   • Smokeless tobacco: None   • Alcohol use Yes      Comment: socially      Allergies   Allergen Reactions   • Azithromycin    • " Erythromycin    • Ezetimibe-Simvastatin    • Hydrocodone-Acetaminophen    • Ropinirole Hcl    • Simvastatin    • Statins    • Sulfa Antibiotics    • Sulfur    • Zetia [Ezetimibe]          History of Present Illness   Encounter Diagnoses   Name Primary?   • Hyperuricemia Yes   • Chronic fatigue    • Controlled type 2 diabetes mellitus with complication, without long-term current use of insulin    • Vitamin D deficiency    • Other hyperlipidemia    • Essential hypertension    • Diabetic peripheral neuropathy    • Hypothyroidism, unspecified type    This is a 68 yo female patient here today for a routine office visit.  She is being seen for the above-mentioned problems.  She had recent labs done which were reviewed and she was provided a copy.  She is tearful at today's visit due to some life stressors that she's had recently.  She states her 37-year-old daughter was diagnosed with colon cancer and is having to go through aggressive therapy.  She also states that her horse  in April that she had had for 18 years.  She states her mother also recently passed away.  She admits to not eating healthy is due to all the stress that she's been going through.  She was recently at [facility] several months ago for bronchitis.  She has not been taking her allopurinol.  She did not bring her blood glucose meter in with her today.  She has had no significant hypoglycemic events.  She is intolerant of atorvastatin and states that any statin causes muscle aches and pain.  She is also not taking her fenofibrate for triglycerides.  We discussed her therapy options for treatment of cholesterol.      The following portions of the patient's history were reviewed and updated as appropriate: allergies, current medications, past family history, past medical history, past social history, past surgical history and problem list.    Review of Systems   Constitutional: Positive for fatigue.   HENT: Negative for trouble swallowing.     Eyes: Negative for visual disturbance.   Respiratory: Negative for shortness of breath.    Cardiovascular: Negative for leg swelling.   Endocrine: Negative for polyphagia.   Skin: Negative for wound.   Neurological: Negative for numbness.       Objective   Physical Exam   Constitutional: She is oriented to person, place, and time. She appears well-developed and well-nourished. No distress.   HENT:   Head: Normocephalic and atraumatic.   Right Ear: External ear normal.   Left Ear: External ear normal.   Nose: Nose normal.   Mouth/Throat: Oropharynx is clear and moist. No oropharyngeal exudate.   Eyes: EOM are normal. Pupils are equal, round, and reactive to light. Right eye exhibits no discharge. Left eye exhibits no discharge.   Neck: Trachea normal, normal range of motion and full passive range of motion without pain. Neck supple. No tracheal tenderness present. Carotid bruit is not present. No tracheal deviation, no edema and no erythema present. No thyroid mass and no thyromegaly present.   Cardiovascular: Normal rate, regular rhythm, normal heart sounds and intact distal pulses.  Exam reveals no gallop and no friction rub.    No murmur heard.  Pulmonary/Chest: Effort normal and breath sounds normal. No stridor. No respiratory distress. She has no wheezes. She has no rales.   Abdominal: Soft. Bowel sounds are normal. She exhibits no distension.   Musculoskeletal: Normal range of motion. She exhibits no edema or deformity.   Lymphadenopathy:     She has no cervical adenopathy.   Neurological: She is alert and oriented to person, place, and time.   Skin: Skin is warm and dry. No rash noted. She is not diaphoretic. No erythema. No pallor.   Psychiatric: She has a normal mood and affect. Her behavior is normal. Judgment and thought content normal.   Nursing note and vitals reviewed.    Results for orders placed or performed in visit on 06/29/17   Comprehensive Metabolic Panel   Result Value Ref Range    Glucose 105  (H) 65 - 99 mg/dL    BUN 14 8 - 23 mg/dL    Creatinine 0.57 0.57 - 1.00 mg/dL    eGFR Non African Am 106 >60 mL/min/1.73    eGFR African Am 129 >60 mL/min/1.73    BUN/Creatinine Ratio 24.6 7.0 - 25.0    Sodium 143 136 - 145 mmol/L    Potassium 4.7 3.5 - 5.2 mmol/L    Chloride 103 98 - 107 mmol/L    Total CO2 24.8 22.0 - 29.0 mmol/L    Calcium 10.1 8.6 - 10.5 mg/dL    Total Protein 7.6 6.0 - 8.5 g/dL    Albumin 4.60 3.50 - 5.20 g/dL    Globulin 3.0 gm/dL    A/G Ratio 1.5 g/dL    Total Bilirubin 0.2 0.1 - 1.2 mg/dL    Alkaline Phosphatase 91 39 - 117 U/L    AST (SGOT) 32 1 - 32 U/L    ALT (SGPT) 46 (H) 1 - 33 U/L   Lipid Panel   Result Value Ref Range    Total Cholesterol 268 (H) 0 - 200 mg/dL    Triglycerides 233 (H) 0 - 150 mg/dL    HDL Cholesterol 44 40 - 60 mg/dL    VLDL Cholesterol 46.6 (H) 5 - 40 mg/dL    LDL Cholesterol  177 (H) 0 - 100 mg/dL   Vitamin D 25 Hydroxy   Result Value Ref Range    25 Hydroxy, Vitamin D 35.6 30.0 - 100.0 ng/mL   Hemoglobin A1c   Result Value Ref Range    Hemoglobin A1C 6.30 (H) 4.80 - 5.60 %   C-Peptide   Result Value Ref Range    C-Peptide 4.5 (H) 1.1 - 4.4 ng/mL   T3, Free   Result Value Ref Range    T3, Free 3.4 2.0 - 4.4 pg/mL   T4, Free   Result Value Ref Range    Free T4 1.69 0.93 - 1.70 ng/dL   Thyroid Panel With TSH   Result Value Ref Range    TSH 0.687 0.450 - 4.500 uIU/mL    T4, Total 9.3 4.5 - 12.0 ug/dL    T3 Uptake 28 24 - 39 %    Free Thyroxine Index 2.6 1.2 - 4.9   Comprehensive Thyroglobulin   Result Value Ref Range    Thyroglobulin Ab <1.0 IU/mL    Thyroglobulin 11.2 ng/mL    Thyroglobulin (TG-HARSH) Comment ng/mL   Uric Acid   Result Value Ref Range    Uric Acid 6.3 (H) 2.4 - 5.7 mg/dL   MicroAlbumin, Urine, Random   Result Value Ref Range    Microalbumin, Urine 22.3 Not Estab. ug/mL         Assessment/Plan   Jose was seen today for diabetes, hypothyroidism and vitamin d deficiency.    Diagnoses and all orders for this visit:    Hyperuricemia    Chronic  fatigue    Controlled type 2 diabetes mellitus with complication, without long-term current use of insulin    Vitamin D deficiency    Other hyperlipidemia    Essential hypertension    Diabetic peripheral neuropathy    Hypothyroidism, unspecified type        Summary, patient was seen and examined.  She will continue all her current medications as prescribed.  I have taken her off the atorvastatin listed as an allergy.  She will start probably went 75 mg by injection every 2 weeks.  She was given sample provided education at today's appointment.  She was educated regarding the mechanism of action of this medication as well as provided some written material.  I've asked that she start her allopurinol 100 mg once daily for hyperuricemia.  Her diabetes is under good control.  She is continue all her other current medications as prescribed.  She will follow-up in 4 months with labs prior.  I've encouraged her to contact the pharmacy should she need a medication refills.  JAXON Dhaliwal was reviewed at today's appointment.  Her peripheral neuropathy is controlled with gabapentin.  Blood pressure and weight are stable.  Her thyroid is in satisfactory range.    Current Outpatient Prescriptions:   •  allopurinol (ZYLOPRIM) 100 MG tablet, Take 1 tablet by mouth Daily., Disp: 30 tablet, Rfl: 5  •  amLODIPine-valsartan (EXFORGE)  MG per tablet, Take 1 tablet by mouth Daily., Disp: 90 tablet, Rfl: 1  •  ASPIRIN LOW DOSE 81 MG EC tablet, Take 81 mg by mouth Daily., Disp: , Rfl: 11  •  carvedilol (COREG) 3.125 MG tablet, Take 1 tablet by mouth 2 (Two) Times a Day With Meals., Disp: 180 tablet, Rfl: 1  •  DULoxetine (CYMBALTA) 60 MG capsule, TAKE 1 CAPSULE BY MOUTH EVERY DAY, Disp: 90 capsule, Rfl: 0  •  gabapentin (NEURONTIN) 300 MG capsule, TAKE 1 CAPSULE BY MOUTH THREE TIMES DAILY, Disp: 270 capsule, Rfl: 0  •  glucose blood (ACCU-CHEK SMARTVIEW) test strip, 1 each by Other route As Needed. Use to test Bg 1 time daily, Disp: ,  Rfl:   •  Lancets (ACCU-CHEK SOFT TOUCH) lancets, 1 each by Other route As Needed. Use to test BG 1 time daily, Disp: , Rfl:   •  levocetirizine (XYZAL) 5 MG tablet, Take 5 mg by mouth Daily., Disp: , Rfl: 3  •  metFORMIN (GLUCOPHAGE) 1000 MG tablet, TAKE 1 TABLET BY MOUTH TWICE DAILY FOR DIABETES, Disp: 180 tablet, Rfl: 0  •  Multiple Vitamins-Minerals (MULTIVITAMIN PO), Take 1 tablet by mouth daily., Disp: , Rfl:   •  Multiple Vitamins-Minerals (OCUVITE PO), Take 1 tablet by mouth daily., Disp: , Rfl:   •  PRALUENT 75 MG/ML solution pen-injector, Inject 1 dose under the skin Every 14 (Fourteen) Days., Disp: 2 pen, Rfl: 5  •  SYNTHROID 125 MCG tablet, TAKE 1 TABLET BY MOUTH DAILY AS DIRECTED, Disp: 90 tablet, Rfl: 0  •  vitamin D (ERGOCALCIFEROL) 89318 UNITS capsule capsule, Take 1 capsule by mouth Every 7 (Seven) Days., Disp: 13 capsule, Rfl: 3  •  Fenofibrate (LIPOFEN) 150 MG capsule, Take 150 mg by mouth daily., Disp: , Rfl:

## 2017-07-18 RX ORDER — GABAPENTIN 300 MG/1
300 CAPSULE ORAL 3 TIMES DAILY
Qty: 270 CAPSULE | Refills: 0 | OUTPATIENT
Start: 2017-07-18 | End: 2018-01-30 | Stop reason: SDUPTHER

## 2017-07-19 ENCOUNTER — TELEPHONE (OUTPATIENT)
Dept: ENDOCRINOLOGY | Age: 67
End: 2017-07-19

## 2017-07-19 DIAGNOSIS — R30.0 BURNING WITH URINATION: Primary | ICD-10-CM

## 2017-07-19 RX ORDER — GABAPENTIN 300 MG/1
CAPSULE ORAL
Qty: 270 CAPSULE | Refills: 0 | OUTPATIENT
Start: 2017-07-19

## 2017-07-19 NOTE — TELEPHONE ENCOUNTER
----- Message from SHASHI Chandler sent at 7/19/2017 10:07 AM EDT -----  She either can see her pcp or come in here for a u/a. I cannot prescribe an antibiotic unless I know she has infection. Another option is to go to a [facility]  ----- Message -----     From: Felipa Zhang MA     Sent: 7/19/2017   9:34 AM       To: SHASHI Chandler    Patient is experiencing a UTI and thinks it might be due to starting the praluent. She is asking if we can prescribe something for her. Please advise.     I spoke with patient and she expressed understanding. She will follow up at [facility] or Dr. Garcia office. She also states that she can't afford the praluent.

## 2017-07-21 RX ORDER — LANCETS
EACH MISCELLANEOUS
Qty: 102 EACH | Refills: 1 | Status: SHIPPED | OUTPATIENT
Start: 2017-07-21 | End: 2018-03-06 | Stop reason: SDUPTHER

## 2017-08-10 RX ORDER — DULOXETIN HYDROCHLORIDE 60 MG/1
CAPSULE, DELAYED RELEASE ORAL
Qty: 90 CAPSULE | Refills: 0 | Status: SHIPPED | OUTPATIENT
Start: 2017-08-10 | End: 2017-11-09 | Stop reason: SDUPTHER

## 2017-08-25 RX ORDER — LEVOTHYROXINE SODIUM 125 MCG
125 TABLET ORAL DAILY
Qty: 90 TABLET | Refills: 0 | Status: SHIPPED | OUTPATIENT
Start: 2017-08-25 | End: 2017-12-07 | Stop reason: SDUPTHER

## 2017-08-25 RX ORDER — LEVOTHYROXINE SODIUM 125 MCG
TABLET ORAL
Qty: 90 TABLET | Refills: 0 | Status: CANCELLED | OUTPATIENT
Start: 2017-08-25

## 2017-11-09 RX ORDER — DULOXETIN HYDROCHLORIDE 60 MG/1
CAPSULE, DELAYED RELEASE ORAL
Qty: 90 CAPSULE | Refills: 0 | Status: SHIPPED | OUTPATIENT
Start: 2017-11-09 | End: 2018-02-09 | Stop reason: SDUPTHER

## 2017-11-13 DIAGNOSIS — E03.9 HYPOTHYROIDISM, UNSPECIFIED TYPE: ICD-10-CM

## 2017-11-13 DIAGNOSIS — E11.8 CONTROLLED TYPE 2 DIABETES MELLITUS WITH COMPLICATION, WITHOUT LONG-TERM CURRENT USE OF INSULIN (HCC): Primary | ICD-10-CM

## 2017-11-13 DIAGNOSIS — E55.9 VITAMIN D DEFICIENCY: ICD-10-CM

## 2017-12-04 RX ORDER — LEVOTHYROXINE SODIUM 125 MCG
TABLET ORAL
Qty: 90 TABLET | Refills: 0 | Status: SHIPPED | OUTPATIENT
Start: 2017-12-04 | End: 2018-07-19 | Stop reason: SDUPTHER

## 2017-12-07 ENCOUNTER — OFFICE VISIT (OUTPATIENT)
Dept: ENDOCRINOLOGY | Age: 67
End: 2017-12-07

## 2017-12-07 VITALS
WEIGHT: 200 LBS | BODY MASS INDEX: 33.32 KG/M2 | HEIGHT: 65 IN | SYSTOLIC BLOOD PRESSURE: 126 MMHG | DIASTOLIC BLOOD PRESSURE: 78 MMHG

## 2017-12-07 DIAGNOSIS — E79.0 HYPERURICEMIA: ICD-10-CM

## 2017-12-07 DIAGNOSIS — E55.9 VITAMIN D DEFICIENCY: ICD-10-CM

## 2017-12-07 DIAGNOSIS — I10 ESSENTIAL HYPERTENSION: ICD-10-CM

## 2017-12-07 DIAGNOSIS — R80.9 PROTEINURIA, UNSPECIFIED TYPE: ICD-10-CM

## 2017-12-07 DIAGNOSIS — E78.49 OTHER HYPERLIPIDEMIA: ICD-10-CM

## 2017-12-07 DIAGNOSIS — E11.8 CONTROLLED TYPE 2 DIABETES MELLITUS WITH COMPLICATION, WITHOUT LONG-TERM CURRENT USE OF INSULIN (HCC): Primary | ICD-10-CM

## 2017-12-07 DIAGNOSIS — R79.89 LFT ELEVATION: ICD-10-CM

## 2017-12-07 PROBLEM — N95.2 VAGINAL ATROPHY: Status: ACTIVE | Noted: 2017-12-07

## 2017-12-07 PROCEDURE — 99214 OFFICE O/P EST MOD 30 MIN: CPT | Performed by: NURSE PRACTITIONER

## 2017-12-07 RX ORDER — LEVOTHYROXINE SODIUM 125 MCG
TABLET ORAL
Qty: 90 TABLET | Refills: 0 | Status: SHIPPED | OUTPATIENT
Start: 2017-12-07 | End: 2017-12-07 | Stop reason: SDUPTHER

## 2017-12-07 RX ORDER — ESTERIFIED ESTROGEN AND METHYLTESTOSTERONE .625; 1.25 MG/1; MG/1
1 TABLET ORAL DAILY
Qty: 30 TABLET | Refills: 2 | Status: SHIPPED | OUTPATIENT
Start: 2017-12-07 | End: 2018-04-06 | Stop reason: SDUPTHER

## 2017-12-07 NOTE — PROGRESS NOTES
"Subjective   Jose Keys is a 67 y.o. female is here today for follow-up.  Chief Complaint   Patient presents with   • Diabetes     no recent labs, pt test BG 1x daily, pt did not bring meter   • Vitamin D Deficiency   • Hypertension   • Hyperlipidemia   • hyperuricemia   • Hypothyroidism   • Fatigue     /78  Ht 165.1 cm (65\")  Wt 90.7 kg (200 lb)  BMI 33.28 kg/m2  Current Outpatient Prescriptions on File Prior to Visit   Medication Sig   • allopurinol (ZYLOPRIM) 100 MG tablet Take 1 tablet by mouth Daily.   • amLODIPine-valsartan (EXFORGE)  MG per tablet Take 1 tablet by mouth Daily.   • ASPIRIN LOW DOSE 81 MG EC tablet Take 81 mg by mouth Daily.   • carvedilol (COREG) 3.125 MG tablet Take 1 tablet by mouth 2 (Two) Times a Day With Meals.   • DULoxetine (CYMBALTA) 60 MG capsule TAKE 1 CAPSULE BY MOUTH EVERY DAY   • gabapentin (NEURONTIN) 300 MG capsule Take 1 capsule by mouth 3 (Three) Times a Day.   • glucose blood (ACCU-CHEK SMARTVIEW) test strip Use to test Bg 1 time daily   • levocetirizine (XYZAL) 5 MG tablet Take 5 mg by mouth Daily.   • metFORMIN (GLUCOPHAGE) 1000 MG tablet TAKE 1 TABLET BY MOUTH TWICE DAILY FOR DIABETES   • Multiple Vitamins-Minerals (MULTIVITAMIN PO) Take 1 tablet by mouth daily.   • Multiple Vitamins-Minerals (OCUVITE PO) Take 1 tablet by mouth daily.   • SYNTHROID 125 MCG tablet TAKE 1 TABLET BY MOUTH DAILY AS DIRECTED   • vitamin D (ERGOCALCIFEROL) 08411 UNITS capsule capsule Take 1 capsule by mouth Every 7 (Seven) Days.   • ACCU-CHEK FASTCLIX LANCETS misc Use to test BG 1 time daily   • Fenofibrate (LIPOFEN) 150 MG capsule Take 150 mg by mouth daily.   • [DISCONTINUED] PRALUENT 75 MG/ML solution pen-injector Inject 1 dose under the skin Every 14 (Fourteen) Days.   • [DISCONTINUED] SYNTHROID 125 MCG tablet Take 1 tablet by mouth Daily.     No current facility-administered medications on file prior to visit.      Family History   Problem Relation Age of Onset   • Diabetes " Father    • Hypertension Father    • Diabetes Sister      Social History   Substance Use Topics   • Smoking status: Never Smoker   • Smokeless tobacco: None   • Alcohol use Yes      Comment: socially      Allergies   Allergen Reactions   • Atorvastatin    • Azithromycin    • Erythromycin    • Ezetimibe-Simvastatin    • Hydrocodone-Acetaminophen    • Ropinirole Hcl    • Simvastatin    • Statins    • Sulfa Antibiotics    • Sulfur    • Zetia [Ezetimibe]          History of Present Illness   Encounter Diagnoses   Name Primary?   • Other hyperlipidemia Yes   • Essential hypertension    • Vitamin D deficiency    • Controlled type 2 diabetes mellitus with complication, without long-term current use of insulin    • Proteinuria, unspecified type    • Hyperuricemia    • LFT elevation      This is a 67-year-old female patient here today for routine follow-up visit.  She is being seen for the above-mentioned problems.  She is taking her medications as prescribed.  She has not been checking her blood sugars regularly however she will check her blood sugars when she feels low and it typically is in the 70-80 range.  She states her highest blood sugar reading at home is been 127 mg/dL.  She is complaining of painful sexual intercourse as well as getting up to urinate 2-3 times a night.  She has had a history of a total hysterectomy and does feel like she has vaginal atrophy.        The following portions of the patient's history were reviewed and updated as appropriate: allergies, current medications, past family history, past medical history, past social history, past surgical history and problem list.    Review of Systems   Constitutional: Negative for fatigue.   HENT: Negative for trouble swallowing.    Eyes: Negative for visual disturbance.   Respiratory: Negative for shortness of breath.    Cardiovascular: Negative for leg swelling.   Endocrine: Negative for polyuria.   Skin: Negative for wound.   Neurological: Negative for  numbness.       Objective   Physical Exam   Constitutional: She is oriented to person, place, and time. She appears well-developed and well-nourished. No distress.   HENT:   Head: Normocephalic and atraumatic.   Right Ear: External ear normal.   Left Ear: External ear normal.   Nose: Nose normal.   Mouth/Throat: Oropharynx is clear and moist. No oropharyngeal exudate.   Eyes: EOM are normal. Pupils are equal, round, and reactive to light. Right eye exhibits no discharge. Left eye exhibits no discharge.   exopthalic ou   Neck: Trachea normal, normal range of motion and full passive range of motion without pain. Neck supple. No tracheal tenderness present. Carotid bruit is not present. No tracheal deviation, no edema and no erythema present. No thyroid mass and no thyromegaly present.   Cardiovascular: Normal rate, regular rhythm, normal heart sounds and intact distal pulses.  Exam reveals no gallop and no friction rub.    No murmur heard.  Pulmonary/Chest: Effort normal and breath sounds normal. No stridor. No respiratory distress. She has no wheezes. She has no rales.   Abdominal: Soft. Bowel sounds are normal. She exhibits no distension.   Musculoskeletal: Normal range of motion. She exhibits no edema or deformity.   Lymphadenopathy:     She has no cervical adenopathy.   Neurological: She is alert and oriented to person, place, and time.   Skin: Skin is warm and dry. No rash noted. She is not diaphoretic. No erythema. No pallor.   Psychiatric: She has a normal mood and affect. Her behavior is normal. Judgment and thought content normal.   Nursing note and vitals reviewed.      Lab on 06/29/2017   Component Date Value Ref Range Status   • Glucose 06/29/2017 105* 65 - 99 mg/dL Final   • BUN 06/29/2017 14  8 - 23 mg/dL Final   • Creatinine 06/29/2017 0.57  0.57 - 1.00 mg/dL Final   • eGFR Non African Am 06/29/2017 106  >60 mL/min/1.73 Final   • eGFR African Am 06/29/2017 129  >60 mL/min/1.73 Final   • BUN/Creatinine  Ratio 06/29/2017 24.6  7.0 - 25.0 Final   • Sodium 06/29/2017 143  136 - 145 mmol/L Final   • Potassium 06/29/2017 4.7  3.5 - 5.2 mmol/L Final   • Chloride 06/29/2017 103  98 - 107 mmol/L Final   • Total CO2 06/29/2017 24.8  22.0 - 29.0 mmol/L Final   • Calcium 06/29/2017 10.1  8.6 - 10.5 mg/dL Final   • Total Protein 06/29/2017 7.6  6.0 - 8.5 g/dL Final   • Albumin 06/29/2017 4.60  3.50 - 5.20 g/dL Final   • Globulin 06/29/2017 3.0  gm/dL Final   • A/G Ratio 06/29/2017 1.5  g/dL Final   • Total Bilirubin 06/29/2017 0.2  0.1 - 1.2 mg/dL Final   • Alkaline Phosphatase 06/29/2017 91  39 - 117 U/L Final   • AST (SGOT) 06/29/2017 32  1 - 32 U/L Final   • ALT (SGPT) 06/29/2017 46* 1 - 33 U/L Final   • Total Cholesterol 06/29/2017 268* 0 - 200 mg/dL Final   • Triglycerides 06/29/2017 233* 0 - 150 mg/dL Final   • HDL Cholesterol 06/29/2017 44  40 - 60 mg/dL Final   • VLDL Cholesterol 06/29/2017 46.6* 5 - 40 mg/dL Final   • LDL Cholesterol  06/29/2017 177* 0 - 100 mg/dL Final   • 25 Hydroxy, Vitamin D 06/29/2017 35.6  30.0 - 100.0 ng/mL Final    Comment: Reference Range for Total Vitamin D 25(OH)  Deficiency    <20.0 ng/mL  Insufficiency 21-29 ng/mL  Sufficiency    ng/mL  Toxicity      >100 ng/ml        • Hemoglobin A1C 06/29/2017 6.30* 4.80 - 5.60 % Final    Comment: Hemoglobin A1C Ranges:  Increased Risk for Diabetes  5.7% to 6.4%  Diabetes                     >= 6.5%  Diabetic Goal                < 7.0%     • C-Peptide 06/29/2017 4.5* 1.1 - 4.4 ng/mL Final    C-Peptide reference interval is for fasting patients.   • T3, Free 06/29/2017 3.4  2.0 - 4.4 pg/mL Final   • Free T4 06/29/2017 1.69  0.93 - 1.70 ng/dL Final   • TSH 06/29/2017 0.687  0.450 - 4.500 uIU/mL Final   • T4, Total 06/29/2017 9.3  4.5 - 12.0 ug/dL Final   • T3 Uptake 06/29/2017 28  24 - 39 % Final   • Free Thyroxine Index 06/29/2017 2.6  1.2 - 4.9 Final   • Thyroglobulin Ab 06/29/2017 <1.0  IU/mL Final    Comment: Reference Range:  <1.0           Negative  > or = 1.0    Positive     • Thyroglobulin 06/29/2017 11.2  ng/mL Final    Comment: Reference Range:  >17y: 1.5 - 38.5  According to the National Academy of [employer],  the reference interval for Thyroglobulin (TG) should be  related to euthyroid patients and not for patients who  underwent thyroidectomy.  TG reference intervals for these  patients depend on the residual mass of the thyroid tissue  left after surgery.  Establishing a post-operative baseline  is recommended.  The assay quantitation limit is 0.1 ng/mL.     • Thyroglobulin (TG-HARSH) 06/29/2017 Comment  ng/mL Final    Not applicable   • Uric Acid 06/29/2017 6.3* 2.4 - 5.7 mg/dL Final   • Microalbumin, Urine 06/29/2017 22.3  Not Estab. ug/mL Final       Assessment/Plan   Problems Addressed this Visit        Cardiovascular and Mediastinum    Hyperlipidemia    Relevant Orders    Comprehensive Metabolic Panel    C-Peptide    Hemoglobin A1c    Lipid Panel    MicroAlbumin, Urine, Random - Urine, Clean Catch    T3, Free    T4, Free    Thyroid Panel With TSH    Vitamin D 25 Hydroxy    Uric Acid    Comprehensive Thyroglobulin    Hypertension    Relevant Orders    Comprehensive Metabolic Panel    C-Peptide    Hemoglobin A1c    Lipid Panel    MicroAlbumin, Urine, Random - Urine, Clean Catch    T3, Free    T4, Free    Thyroid Panel With TSH    Vitamin D 25 Hydroxy    Uric Acid    Comprehensive Thyroglobulin       Digestive    Vitamin D deficiency    Relevant Orders    Comprehensive Metabolic Panel    C-Peptide    Hemoglobin A1c    Lipid Panel    MicroAlbumin, Urine, Random - Urine, Clean Catch    T3, Free    T4, Free    Thyroid Panel With TSH    Vitamin D 25 Hydroxy    Uric Acid    Comprehensive Thyroglobulin       Endocrine    Controlled type 2 diabetes mellitus - Primary    Relevant Orders    Comprehensive Metabolic Panel    C-Peptide    Hemoglobin A1c    Lipid Panel    MicroAlbumin, Urine, Random - Urine, Clean Catch    T3, Free    T4, Free     Thyroid Panel With TSH    Vitamin D 25 Hydroxy    Uric Acid    Comprehensive Thyroglobulin       Genitourinary    Proteinuria    Relevant Orders    Comprehensive Metabolic Panel    C-Peptide    Hemoglobin A1c    Lipid Panel    MicroAlbumin, Urine, Random - Urine, Clean Catch    T3, Free    T4, Free    Thyroid Panel With TSH    Vitamin D 25 Hydroxy    Uric Acid    Comprehensive Thyroglobulin       Other    Hyperuricemia    Relevant Orders    Comprehensive Metabolic Panel    C-Peptide    Hemoglobin A1c    Lipid Panel    MicroAlbumin, Urine, Random - Urine, Clean Catch    T3, Free    T4, Free    Thyroid Panel With TSH    Vitamin D 25 Hydroxy    Uric Acid    Comprehensive Thyroglobulin    LFT elevation    Relevant Orders    Comprehensive Metabolic Panel    C-Peptide    Hemoglobin A1c    Lipid Panel    MicroAlbumin, Urine, Random - Urine, Clean Catch    T3, Free    T4, Free    Thyroid Panel With TSH    Vitamin D 25 Hydroxy    Uric Acid    Comprehensive Thyroglobulin            In summary, patient was seen and examined.  She will have extensive laboratory evaluation and states visit will be notified of the results along with any further recommendations.  JAXON Dhaliwal has been reviewed at today's visit.  She was prescribed Estratest for her menopausal symptoms of vaginal atrophy.       she was provided a printed prescription.  She will follow-up in 6 months with labs prior.  She was either myself or Dr. Denney.  I've encouraged her to contact the office should any questions or concerns prior to her next visit.  Metabolically she is stable.  Her blood pressure is an satisfactory range

## 2017-12-13 ENCOUNTER — RESULTS ENCOUNTER (OUTPATIENT)
Dept: ENDOCRINOLOGY | Age: 67
End: 2017-12-13

## 2017-12-13 DIAGNOSIS — E11.8 CONTROLLED TYPE 2 DIABETES MELLITUS WITH COMPLICATION, WITHOUT LONG-TERM CURRENT USE OF INSULIN (HCC): ICD-10-CM

## 2017-12-13 DIAGNOSIS — E03.9 HYPOTHYROIDISM, UNSPECIFIED TYPE: ICD-10-CM

## 2017-12-13 DIAGNOSIS — E55.9 VITAMIN D DEFICIENCY: ICD-10-CM

## 2017-12-13 LAB
25(OH)D3+25(OH)D2 SERPL-MCNC: 34.9 NG/ML (ref 30–100)
ALBUMIN SERPL-MCNC: 4.8 G/DL (ref 3.5–5.2)
ALBUMIN/GLOB SERPL: 1.7 G/DL
ALP SERPL-CCNC: 101 U/L (ref 39–117)
ALT SERPL-CCNC: 46 U/L (ref 1–33)
AST SERPL-CCNC: 29 U/L (ref 1–32)
BILIRUB SERPL-MCNC: 0.3 MG/DL (ref 0.1–1.2)
BUN SERPL-MCNC: 12 MG/DL (ref 8–23)
BUN/CREAT SERPL: 21.1 (ref 7–25)
C PEPTIDE SERPL-MCNC: 4.7 NG/ML (ref 1.1–4.4)
CALCIUM SERPL-MCNC: 9.5 MG/DL (ref 8.6–10.5)
CHLORIDE SERPL-SCNC: 102 MMOL/L (ref 98–107)
CHOLEST SERPL-MCNC: 279 MG/DL (ref 0–200)
CO2 SERPL-SCNC: 24.5 MMOL/L (ref 22–29)
CREAT SERPL-MCNC: 0.57 MG/DL (ref 0.57–1)
FT4I SERPL CALC-MCNC: 1.4 (ref 1.2–4.9)
GFR SERPLBLD CREATININE-BSD FMLA CKD-EPI: 106 ML/MIN/1.73
GFR SERPLBLD CREATININE-BSD FMLA CKD-EPI: 129 ML/MIN/1.73
GLOBULIN SER CALC-MCNC: 2.8 GM/DL
GLUCOSE SERPL-MCNC: 85 MG/DL (ref 65–99)
HBA1C MFR BLD: 6.4 % (ref 4.8–5.6)
HDLC SERPL-MCNC: 45 MG/DL (ref 40–60)
INTERPRETATION: NORMAL
LDLC SERPL CALC-MCNC: 175 MG/DL (ref 0–100)
Lab: NORMAL
MICROALBUMIN UR-MCNC: 31.4 UG/ML
POTASSIUM SERPL-SCNC: 4.2 MMOL/L (ref 3.5–5.2)
PROT SERPL-MCNC: 7.6 G/DL (ref 6–8.5)
SODIUM SERPL-SCNC: 141 MMOL/L (ref 136–145)
T3FREE SERPL-MCNC: 2.8 PG/ML (ref 2–4.4)
T3RU NFR SERPL: 23 % (ref 24–39)
T4 FREE SERPL-MCNC: 1.06 NG/DL (ref 0.93–1.7)
T4 SERPL-MCNC: 6.3 UG/DL (ref 4.5–12)
THYROGLOB AB SERPL-ACNC: <1 IU/ML
THYROGLOB SERPL-MCNC: 9.6 NG/ML
THYROGLOB SERPL-MCNC: NORMAL NG/ML
TRIGL SERPL-MCNC: 293 MG/DL (ref 0–150)
TSH SERPL DL<=0.005 MIU/L-ACNC: 2.53 UIU/ML (ref 0.45–4.5)
URATE SERPL-MCNC: 5.7 MG/DL (ref 2.4–5.7)
VLDLC SERPL CALC-MCNC: 58.6 MG/DL (ref 5–40)

## 2017-12-14 ENCOUNTER — TELEPHONE (OUTPATIENT)
Dept: ENDOCRINOLOGY | Age: 67
End: 2017-12-14

## 2017-12-14 RX ORDER — EVOLOCUMAB 140 MG/ML
1 INJECTION, SOLUTION SUBCUTANEOUS
Qty: 2 SYRINGE | Refills: 5 | Status: SHIPPED | OUTPATIENT
Start: 2017-12-14 | End: 2017-12-14 | Stop reason: SDUPTHER

## 2017-12-14 RX ORDER — EVOLOCUMAB 140 MG/ML
1 INJECTION, SOLUTION SUBCUTANEOUS
Qty: 2 SYRINGE | Refills: 5 | Status: SHIPPED | OUTPATIENT
Start: 2017-12-14 | End: 2018-07-19

## 2017-12-14 NOTE — TELEPHONE ENCOUNTER
----- Message from SHASHI Chandler sent at 12/14/2017  9:40 AM EST -----  praluent was taken off her med list by you. I can;t find why and there is no mention in my note. Her cholesterol is not good. I have sent over a new rx for repatha to take 140 mg sq every 14 days. Let her know. Let sb know there needs to be a PA more than likely. Find out what happened to praluent. Same class of med as repatha.     Called patient with lab results and med changes. Pt phone connection was breaking up during the phone call. Tried to contact her again and she was having the same problem. Will mail out copy today.

## 2018-01-31 RX ORDER — GABAPENTIN 300 MG/1
CAPSULE ORAL
Qty: 90 CAPSULE | Refills: 0 | OUTPATIENT
Start: 2018-01-31 | End: 2018-03-04 | Stop reason: SDUPTHER

## 2018-02-12 RX ORDER — DULOXETIN HYDROCHLORIDE 60 MG/1
CAPSULE, DELAYED RELEASE ORAL
Qty: 90 CAPSULE | Refills: 0 | Status: SHIPPED | OUTPATIENT
Start: 2018-02-12 | End: 2018-05-14 | Stop reason: SDUPTHER

## 2018-03-05 RX ORDER — GABAPENTIN 300 MG/1
CAPSULE ORAL
Qty: 90 CAPSULE | Refills: 0 | OUTPATIENT
Start: 2018-03-05 | End: 2018-04-06 | Stop reason: SDUPTHER

## 2018-03-06 RX ORDER — ERGOCALCIFEROL 1.25 MG/1
CAPSULE ORAL
Qty: 13 CAPSULE | Refills: 0 | Status: SHIPPED | OUTPATIENT
Start: 2018-03-06 | End: 2018-05-29 | Stop reason: SDUPTHER

## 2018-03-06 RX ORDER — LANCETS
EACH MISCELLANEOUS
Qty: 200 EACH | Refills: 0 | Status: SHIPPED | OUTPATIENT
Start: 2018-03-06 | End: 2018-11-09 | Stop reason: SDUPTHER

## 2018-04-06 RX ORDER — GABAPENTIN 300 MG/1
300 CAPSULE ORAL 3 TIMES DAILY
Qty: 90 CAPSULE | Refills: 0 | OUTPATIENT
Start: 2018-04-06 | End: 2018-05-15 | Stop reason: SDUPTHER

## 2018-04-06 RX ORDER — ESTERIFIED ESTROGEN AND METHYLTESTOSTERONE .625; 1.25 MG/1; MG/1
1 TABLET ORAL DAILY
Qty: 30 TABLET | Refills: 0 | OUTPATIENT
Start: 2018-04-06 | End: 2018-05-15 | Stop reason: SDUPTHER

## 2018-05-10 ENCOUNTER — LAB (OUTPATIENT)
Dept: ENDOCRINOLOGY | Age: 68
End: 2018-05-10

## 2018-05-10 DIAGNOSIS — E55.9 VITAMIN D DEFICIENCY: ICD-10-CM

## 2018-05-10 DIAGNOSIS — E11.8 CONTROLLED TYPE 2 DIABETES MELLITUS WITH COMPLICATION, WITHOUT LONG-TERM CURRENT USE OF INSULIN (HCC): ICD-10-CM

## 2018-05-10 DIAGNOSIS — E03.9 HYPOTHYROIDISM, UNSPECIFIED TYPE: ICD-10-CM

## 2018-05-12 LAB
25(OH)D3+25(OH)D2 SERPL-MCNC: 55.3 NG/ML (ref 30–100)
ALBUMIN SERPL-MCNC: 4.4 G/DL (ref 3.5–5.2)
ALBUMIN/GLOB SERPL: 1.7 G/DL
ALP SERPL-CCNC: 80 U/L (ref 39–117)
ALT SERPL-CCNC: 29 U/L (ref 1–33)
AST SERPL-CCNC: 19 U/L (ref 1–32)
BILIRUB SERPL-MCNC: 0.4 MG/DL (ref 0.1–1.2)
BUN SERPL-MCNC: 10 MG/DL (ref 8–23)
BUN/CREAT SERPL: 16.1 (ref 7–25)
C PEPTIDE SERPL-MCNC: 4.7 NG/ML (ref 1.1–4.4)
CALCIUM SERPL-MCNC: 9.2 MG/DL (ref 8.6–10.5)
CHLORIDE SERPL-SCNC: 103 MMOL/L (ref 98–107)
CHOLEST SERPL-MCNC: 264 MG/DL (ref 0–200)
CO2 SERPL-SCNC: 26.7 MMOL/L (ref 22–29)
CREAT SERPL-MCNC: 0.62 MG/DL (ref 0.57–1)
FT4I SERPL CALC-MCNC: 2.6 (ref 1.2–4.9)
GFR SERPLBLD CREATININE-BSD FMLA CKD-EPI: 116 ML/MIN/1.73
GFR SERPLBLD CREATININE-BSD FMLA CKD-EPI: 96 ML/MIN/1.73
GLOBULIN SER CALC-MCNC: 2.6 GM/DL
GLUCOSE SERPL-MCNC: 109 MG/DL (ref 65–99)
HBA1C MFR BLD: 6.66 % (ref 4.8–5.6)
HDLC SERPL-MCNC: 38 MG/DL (ref 40–60)
INTERPRETATION: NORMAL
LDLC SERPL CALC-MCNC: 177 MG/DL (ref 0–100)
Lab: NORMAL
MICROALBUMIN UR-MCNC: 11.4 UG/ML
POTASSIUM SERPL-SCNC: 4.6 MMOL/L (ref 3.5–5.2)
PROT SERPL-MCNC: 7 G/DL (ref 6–8.5)
SODIUM SERPL-SCNC: 143 MMOL/L (ref 136–145)
T3FREE SERPL-MCNC: 3.4 PG/ML (ref 2–4.4)
T3RU NFR SERPL: 30 % (ref 24–39)
T4 FREE SERPL-MCNC: 1.76 NG/DL (ref 0.93–1.7)
T4 SERPL-MCNC: 8.7 UG/DL (ref 4.5–12)
THYROGLOB AB SERPL-ACNC: <1 IU/ML
THYROGLOB SERPL-MCNC: 7 NG/ML
THYROGLOB SERPL-MCNC: NORMAL NG/ML
TRIGL SERPL-MCNC: 246 MG/DL (ref 0–150)
TSH SERPL DL<=0.005 MIU/L-ACNC: 0.43 UIU/ML (ref 0.45–4.5)
URATE SERPL-MCNC: 5 MG/DL (ref 2.4–5.7)
VLDLC SERPL CALC-MCNC: 49.2 MG/DL (ref 5–40)

## 2018-05-14 RX ORDER — ALLOPURINOL 100 MG/1
100 TABLET ORAL DAILY
Qty: 30 TABLET | Refills: 0 | Status: SHIPPED | OUTPATIENT
Start: 2018-05-14 | End: 2018-06-14 | Stop reason: SDUPTHER

## 2018-05-14 RX ORDER — DULOXETIN HYDROCHLORIDE 60 MG/1
CAPSULE, DELAYED RELEASE ORAL
Qty: 90 CAPSULE | Refills: 0 | Status: SHIPPED | OUTPATIENT
Start: 2018-05-14 | End: 2018-07-19

## 2018-05-15 RX ORDER — GABAPENTIN 300 MG/1
300 CAPSULE ORAL 3 TIMES DAILY
Qty: 90 CAPSULE | Refills: 0 | OUTPATIENT
Start: 2018-05-15 | End: 2018-07-30 | Stop reason: SDUPTHER

## 2018-05-15 RX ORDER — ESTERIFIED ESTROGEN AND METHYLTESTOSTERONE .625; 1.25 MG/1; MG/1
1 TABLET ORAL DAILY
Qty: 30 TABLET | Refills: 0 | OUTPATIENT
Start: 2018-05-15 | End: 2018-07-25

## 2018-05-30 RX ORDER — ERGOCALCIFEROL 1.25 MG/1
CAPSULE ORAL
Qty: 13 CAPSULE | Refills: 0 | Status: SHIPPED | OUTPATIENT
Start: 2018-05-30 | End: 2018-08-30 | Stop reason: SDUPTHER

## 2018-06-15 RX ORDER — LEVOTHYROXINE SODIUM 125 MCG
TABLET ORAL
Qty: 30 TABLET | Refills: 0 | Status: SHIPPED | OUTPATIENT
Start: 2018-06-15 | End: 2018-07-15 | Stop reason: SDUPTHER

## 2018-06-15 RX ORDER — ALLOPURINOL 100 MG/1
100 TABLET ORAL DAILY
Qty: 30 TABLET | Refills: 0 | Status: SHIPPED | OUTPATIENT
Start: 2018-06-15 | End: 2018-08-07 | Stop reason: SDUPTHER

## 2018-06-19 ENCOUNTER — TELEPHONE (OUTPATIENT)
Dept: FAMILY MEDICINE CLINIC | Facility: CLINIC | Age: 68
End: 2018-06-19

## 2018-06-19 DIAGNOSIS — M89.9 BONE DISEASE: ICD-10-CM

## 2018-06-19 DIAGNOSIS — Z78.0 MENOPAUSE: Primary | ICD-10-CM

## 2018-06-19 DIAGNOSIS — Z12.31 ENCOUNTER FOR SCREENING MAMMOGRAM FOR BREAST CANCER: ICD-10-CM

## 2018-07-16 RX ORDER — LEVOTHYROXINE SODIUM 125 MCG
TABLET ORAL
Qty: 30 TABLET | Refills: 0 | Status: SHIPPED | OUTPATIENT
Start: 2018-07-16 | End: 2018-08-14 | Stop reason: SDUPTHER

## 2018-07-19 ENCOUNTER — OFFICE VISIT (OUTPATIENT)
Dept: FAMILY MEDICINE CLINIC | Facility: CLINIC | Age: 68
End: 2018-07-19

## 2018-07-19 VITALS
HEIGHT: 65 IN | SYSTOLIC BLOOD PRESSURE: 114 MMHG | BODY MASS INDEX: 32.49 KG/M2 | RESPIRATION RATE: 16 BRPM | WEIGHT: 195 LBS | TEMPERATURE: 97.4 F | OXYGEN SATURATION: 97 % | DIASTOLIC BLOOD PRESSURE: 64 MMHG | HEART RATE: 63 BPM

## 2018-07-19 DIAGNOSIS — M54.41 CHRONIC BILATERAL LOW BACK PAIN WITH BILATERAL SCIATICA: Primary | ICD-10-CM

## 2018-07-19 DIAGNOSIS — M25.551 PAIN OF BOTH HIP JOINTS: ICD-10-CM

## 2018-07-19 DIAGNOSIS — M25.552 PAIN OF BOTH HIP JOINTS: ICD-10-CM

## 2018-07-19 DIAGNOSIS — Z86.39 HISTORY OF DIABETES MELLITUS: ICD-10-CM

## 2018-07-19 DIAGNOSIS — G89.29 CHRONIC BILATERAL LOW BACK PAIN WITH BILATERAL SCIATICA: Primary | ICD-10-CM

## 2018-07-19 DIAGNOSIS — M54.42 CHRONIC BILATERAL LOW BACK PAIN WITH BILATERAL SCIATICA: Primary | ICD-10-CM

## 2018-07-19 PROCEDURE — 99213 OFFICE O/P EST LOW 20 MIN: CPT | Performed by: PHYSICIAN ASSISTANT

## 2018-07-19 PROCEDURE — 73522 X-RAY EXAM HIPS BI 3-4 VIEWS: CPT | Performed by: PHYSICIAN ASSISTANT

## 2018-07-19 PROCEDURE — 72110 X-RAY EXAM L-2 SPINE 4/>VWS: CPT | Performed by: PHYSICIAN ASSISTANT

## 2018-07-19 RX ORDER — PERPHENAZINE 16 MG
TABLET ORAL
COMMUNITY
End: 2021-12-20

## 2018-07-19 RX ORDER — CETIRIZINE HYDROCHLORIDE 5 MG/1
5 TABLET ORAL
COMMUNITY
End: 2021-12-20

## 2018-07-19 NOTE — PROGRESS NOTES
Subjective   Jose Keys is a 68 y.o. female.     History of Present Illness   Jose Keys 68 y.o. female who presents today for ---LBP   and medication refills.  she has a history of   Patient Active Problem List   Diagnosis   • Abnormal weight gain   • Hyperuricemia   • Diabetic peripheral neuropathy (CMS/HCC)   • Fatigue   • Hyperlipidemia   • Hypertension   • Hypothyroidism   • Menopause present   • Proteinuria   • Uncontrolled type 2 diabetes mellitus (CMS/HCC)   • Vitamin D deficiency   • Anxiety   • Type 2 diabetes mellitus (CMS/HCC)   • LFT elevation   • Neuropathy   • Controlled type 2 diabetes mellitus (CMS/HCC)   • Chest pain   • Palpitations   • Burning with urination   • Vaginal atrophy   .  Sees Endocrine  Episodes of dizziness and not on HRT---need to see endocrine    She will see derm for left leg warts and mole    Results for orders placed or performed in visit on 12/13/17   Comprehensive Metabolic Panel   Result Value Ref Range    Glucose 109 (H) 65 - 99 mg/dL    BUN 10 8 - 23 mg/dL    Creatinine 0.62 0.57 - 1.00 mg/dL    eGFR Non African Am 96 >60 mL/min/1.73    eGFR African Am 116 >60 mL/min/1.73    BUN/Creatinine Ratio 16.1 7.0 - 25.0    Sodium 143 136 - 145 mmol/L    Potassium 4.6 3.5 - 5.2 mmol/L    Chloride 103 98 - 107 mmol/L    Total CO2 26.7 22.0 - 29.0 mmol/L    Calcium 9.2 8.6 - 10.5 mg/dL    Total Protein 7.0 6.0 - 8.5 g/dL    Albumin 4.40 3.50 - 5.20 g/dL    Globulin 2.6 gm/dL    A/G Ratio 1.7 g/dL    Total Bilirubin 0.4 0.1 - 1.2 mg/dL    Alkaline Phosphatase 80 39 - 117 U/L    AST (SGOT) 19 1 - 32 U/L    ALT (SGPT) 29 1 - 33 U/L   Comprehensive Thyroglobulin   Result Value Ref Range    Thyroglobulin Ab <1.0 IU/mL    Thyroglobulin 7.0 ng/mL    Thyroglobulin (TG-HARSH) Comment ng/mL   C-Peptide   Result Value Ref Range    C-Peptide 4.7 (H) 1.1 - 4.4 ng/mL   Hemoglobin A1c   Result Value Ref Range    Hemoglobin A1C 6.66 (H) 4.80 - 5.60 %   Lipid Panel   Result Value Ref Range    Total  Cholesterol 264 (H) 0 - 200 mg/dL    Triglycerides 246 (H) 0 - 150 mg/dL    HDL Cholesterol 38 (L) 40 - 60 mg/dL    VLDL Cholesterol 49.2 (H) 5 - 40 mg/dL    LDL Cholesterol  177 (H) 0 - 100 mg/dL   T3, Free   Result Value Ref Range    T3, Free 3.4 2.0 - 4.4 pg/mL   T4, Free   Result Value Ref Range    Free T4 1.76 (H) 0.93 - 1.70 ng/dL   Thyroid Panel With TSH   Result Value Ref Range    TSH 0.429 (L) 0.450 - 4.500 uIU/mL    T4, Total 8.7 4.5 - 12.0 ug/dL    T3 Uptake 30 24 - 39 %    Free Thyroxine Index 2.6 1.2 - 4.9   Uric Acid   Result Value Ref Range    Uric Acid 5.0 2.4 - 5.7 mg/dL   Vitamin D 25 Hydroxy   Result Value Ref Range    25 Hydroxy, Vitamin D 55.3 30.0 - 100.0 ng/ml   MicroAlbumin, Urine, Random   Result Value Ref Range    Microalbumin, Urine 11.4 Not Estab. ug/mL   Cardiovascular Risk Assessment   Result Value Ref Range    Interpretation Note    Diabetes Patient Education   Result Value Ref Range    PDF Image Not applicable        Jose Keys 68 y.o. female presents for evaluation and treatment of  low back pain. The patient first noted symptoms several years ago. It was not related to none recalled by the patient and nothing in particular but has a history of DDD of L-Spine.  The pain intensity is moderate , and is located lumbar, piriformis area and also in hips and stiff and sore and ROM pain---constant. The pain is described as aching and stiff and occurs with movement and constant. The symptoms are progressive. Symptoms are exacerbated by stairs, light exertion and getting up to stand. Factors which relieve the pain include in past injected hip. Other associated   symptoms include she does have pain post buttock and thigh with ROM and walking. she denies burning in legs and usual burning of feet; episodes weakness legs. Previous history of symptoms: the problem is long-standing. Treatment efforts have included repositioning , without relief.  I will Xray hips and lumbar spine    Does get  spasms right tib/fib lateral  Left leg shorter  The following portions of the patient's history were reviewed and updated as appropriate: allergies, current medications, past family history, past medical history, past social history, past surgical history and problem list.    Review of Systems   Constitutional: Positive for fatigue. Negative for activity change, appetite change and unexpected weight change.   HENT: Positive for tinnitus. Negative for nosebleeds and trouble swallowing.    Eyes: Positive for itching. Negative for pain and visual disturbance.   Respiratory: Negative for chest tightness, shortness of breath and wheezing.    Cardiovascular: Negative for chest pain and palpitations.   Gastrointestinal: Negative for abdominal pain and blood in stool.   Endocrine: Positive for heat intolerance and polyuria.   Genitourinary: Positive for urgency. Negative for difficulty urinating and hematuria.   Musculoskeletal: Positive for arthralgias, back pain, gait problem, myalgias and neck stiffness. Negative for joint swelling.   Skin: Negative for color change and rash.   Allergic/Immunologic: Negative.    Neurological: Positive for dizziness and light-headedness. Negative for syncope and speech difficulty.   Hematological: Negative for adenopathy.   Psychiatric/Behavioral: Negative for agitation and confusion.   All other systems reviewed and are negative.      Objective   Physical Exam   Constitutional: She is oriented to person, place, and time. She appears well-developed and well-nourished. No distress.   HENT:   Head: Normocephalic and atraumatic.   Eyes: Pupils are equal, round, and reactive to light. Conjunctivae and EOM are normal. Right eye exhibits no discharge. Left eye exhibits no discharge. No scleral icterus.   Neck: Normal range of motion. Neck supple. No tracheal deviation present. No thyromegaly present.   Cardiovascular: Normal rate, regular rhythm, normal heart sounds, intact distal pulses and  normal pulses.  Exam reveals no gallop.    No murmur heard.  Pulmonary/Chest: Effort normal and breath sounds normal. No respiratory distress. She has no wheezes. She has no rales.   Musculoskeletal: Normal range of motion. She exhibits no tenderness.   Motor and sensation intact  SLR to 90* bilateral  Dorsiflexion of great toes intact    Hips ROM intact; not tender to palp  ROM pain right   Neurological: She is alert and oriented to person, place, and time. A sensory deficit (toes and feet) is present. She exhibits normal muscle tone. Coordination normal.   Skin: Skin is warm. No rash noted. No erythema. No pallor.   Psychiatric: She has a normal mood and affect. Her behavior is normal. Judgment and thought content normal.   Nursing note and vitals reviewed.      Assessment/Plan   Jose was seen today for dizziness and leg pain.    Diagnoses and all orders for this visit:    Chronic bilateral low back pain with bilateral sciatica  -     XR Spine Lumbar 4+ View  -     XR Hips Bilateral With or Without Pelvis 3-4 View  -     Ambulatory Referral to Physical Therapy    Pain of both hip joints  -     XR Spine Lumbar 4+ View  -     XR Hips Bilateral With or Without Pelvis 3-4 View  -     Ambulatory Referral to Physical Therapy    History of diabetes mellitus

## 2018-07-19 NOTE — PATIENT INSTRUCTIONS
Refer PT  Chronic Back Pain  When back pain lasts longer than 3 months, it is called chronic back pain. The cause of your back pain may not be known. Some common causes include:  · Wear and tear (degenerative disease) of the bones, ligaments, or disks in your back.  · Inflammation and stiffness in your back (arthritis).    People who have chronic back pain often go through certain periods in which the pain is more intense (flare-ups). Many people can learn to manage the pain with home care.  Follow these instructions at home:  Pay attention to any changes in your symptoms. Take these actions to help with your pain:  Activity  · Avoid bending and activities that make the problem worse.  · Do not sit or  one place for long periods of time.  · Take brief periods of rest throughout the day. This will reduce your pain. Resting in a lying or standing position is usually better than sitting to rest.  · When you are resting for longer periods, mix in some mild activity or stretching between periods of rest. This will help to prevent stiffness and pain.  · Get regular exercise. Ask your health care provider what activities are safe for you.  · Do not lift anything that is heavier than 10 lb (4.5 kg). Always use proper lifting technique, which includes:  ? Bending your knees.  ? Keeping the load close to your body.  ? Avoiding twisting.  Managing pain  · If directed, apply ice to the painful area. Your health care provider may recommend applying ice during the first 24-48 hours after a flare-up begins.  ? Put ice in a plastic bag.  ? Place a towel between your skin and the bag.  ? Leave the ice on for 20 minutes, 2-3 times per day.  · After icing, apply heat to the affected area as often as told by your health care provider. Use the heat source that your health care provider recommends, such as a moist heat pack or a heating pad.  ? Place a towel between your skin and the heat source.  ? Leave the heat on for 20-30  minutes.  ? Remove the heat if your skin turns bright red. This is especially important if you are unable to feel pain, heat, or cold. You may have a greater risk of getting burned.  · Try soaking in a warm tub.  · Take over-the-counter and prescription medicines only as told by your health care provider.  · Keep all follow-up visits as told by your health care provider. This is important.  Contact a health care provider if:  · You have pain that is not relieved with rest or medicine.  Get help right away if:  · You have weakness or numbness in one or both of your legs or feet.  · You have trouble controlling your bladder or your bowels.  · You have nausea or vomiting.  · You have pain in your abdomen.  · You have shortness of breath or you faint.  This information is not intended to replace advice given to you by your health care provider. Make sure you discuss any questions you have with your health care provider.  Document Released: 01/25/2006 Document Revised: 04/27/2017 Document Reviewed: 06/06/2016  [employer] Interactive Patient Education © 2018 [employer]

## 2018-07-24 DIAGNOSIS — M54.50 ACUTE MIDLINE LOW BACK PAIN WITHOUT SCIATICA: Primary | ICD-10-CM

## 2018-07-24 DIAGNOSIS — M71.352 OTHER BURSAL CYST, LEFT HIP: ICD-10-CM

## 2018-07-25 ENCOUNTER — TREATMENT (OUTPATIENT)
Dept: PHYSICAL THERAPY | Facility: CLINIC | Age: 68
End: 2018-07-25

## 2018-07-25 ENCOUNTER — TELEPHONE (OUTPATIENT)
Dept: ENDOCRINOLOGY | Age: 68
End: 2018-07-25

## 2018-07-25 DIAGNOSIS — M54.50 CHRONIC BILATERAL LOW BACK PAIN WITHOUT SCIATICA: Primary | ICD-10-CM

## 2018-07-25 DIAGNOSIS — M25.552 PAIN OF BOTH HIP JOINTS: ICD-10-CM

## 2018-07-25 DIAGNOSIS — M25.551 PAIN OF BOTH HIP JOINTS: ICD-10-CM

## 2018-07-25 DIAGNOSIS — G89.29 CHRONIC BILATERAL LOW BACK PAIN WITHOUT SCIATICA: Primary | ICD-10-CM

## 2018-07-25 PROCEDURE — 97161 PT EVAL LOW COMPLEX 20 MIN: CPT | Performed by: PHYSICAL THERAPIST

## 2018-07-25 PROCEDURE — G8979 MOBILITY GOAL STATUS: HCPCS | Performed by: PHYSICAL THERAPIST

## 2018-07-25 PROCEDURE — G8978 MOBILITY CURRENT STATUS: HCPCS | Performed by: PHYSICAL THERAPIST

## 2018-07-25 PROCEDURE — 97110 THERAPEUTIC EXERCISES: CPT | Performed by: PHYSICAL THERAPIST

## 2018-07-25 NOTE — PROGRESS NOTES
Physical Therapy Initial Evaluation and Plan of Care    Patient: Jose Keys   : 1950  Diagnosis/ICD-10 Code:  Chronic bilateral low back pain without sciatica [M54.5, G89.29]  Referring practitioner: Mila Harry PA-C  Past medical Hx reviewed: 2018  Subjective Evaluation    History of Present Illness  Date of onset: 2018  Mechanism of injury: I have a history of back pain from years ago with some arthritic changes but nothing major.  At the end of  I started noticing some sharp R hip pain in the buttock area that feels the worst with going up stairs.  The L hip has a cyst that was upon performing X-ray of the hips.    Currently, I do have more pain in the hips with walking.  We have cats and dogs at home and when I have to step over coello repetitively I may have irritated the hips.  I do have restless leg syndrome but pain at night with ache in the legs and from the waist down.  Prolonged driving/sitting then getting up can be painful.  I have been noticing more episodes of dizziness.  I don't know if its because of my estrogen levels are off.  Balance has been off over the last month or so.  Pt has hx of horse riding but has not ridden in > 1 year.          Patient Occupation: Retired: Teacher   Precautions and Work Restrictions: N/A Quality of life: good    Pain  Current pain ratin  At best pain ratin  At worst pain ratin  Location: R hip/buttock, lateral R leg.  Lower back.   Quality: dull ache and sharp (hips and low back feel locked. )  Relieving factors: medications, heat and relaxation (massage.  aleve. )  Aggravating factors: stairs, standing, sleeping, ambulation, squatting, repetitive movement and movement  Progression: worsening    Social Support  Lives in: one-story house (washer and dryer in the basement)  Lives with: spouse    Diagnostic Tests  No diagnostic tests performed  X-ray: abnormal    Treatments  No previous or current treatments  Previous treatment:  "injection treatment (injections years ago was helpful.  )     Objective       Postural Observations  Seated posture: fair  Standing posture: fair    Additional Postural Observation Details  Supine: R out flare noted, L anterior innominate.  Functional leg length difference (L >R)     Active Range of Motion     Lumbar   Flexion: 65 degrees   Extension: 20 degrees   Left lateral flexion: 26 (Pain to ipsilateral paraspinals) degrees with pain  Right lateral flexion: 22 (pain on ipsilateral paraspinals ) degrees with pain  Left rotation: 25 (Muscle tightness) degrees   Right rotation: 30 (Muscle tightness ) degrees     Strength/Myotome Testing     Left Hip   Planes of Motion   Flexion: 3+  Extension: 4+  Abduction: 4  Adduction: 4+  External rotation: 4  Internal rotation: 4+    Right Hip   Planes of Motion   Flexion: 3+  Extension: 4+  Abduction: 4  Adduction: 4  External rotation: 4  Internal rotation: 4- (pain)    Left Knee   Flexion: 5  Extension: 5    Right Knee   Flexion: 5 (some hamstring pain)  Extension: 5    Left Ankle/Foot   Dorsiflexion: 5  Plantar flexion: 5  Inversion: 5  Eversion: 5    Right Ankle/Foot   Dorsiflexion: 4  Plantar flexion: 5  Inversion: 5  Eversion: 4+    Ambulation     Observational Gait   Gait: antalgic and asymmetric   Decreased walking speed and stride length.     Additional Observational Gait Details  Decreased coordination with ambulation and 2 episodes of foot crossing mid-line and stagger.      Functional Assessment     Forward Step Up 6\"   Left Leg  Positive Trendelenburg.     Right Leg  Pain, positive Trendelenburg, ipsilateral trunk side bending, increased forward trunk lean and increased contralateral push off.     Single Leg Stance   Left: 10 seconds  Right: 4 seconds    Comments  5x Sit to stand:18 sec.  Some crepitus noted.        Assessment & Plan     Assessment  Impairments: abnormal or restricted ROM, activity intolerance, impaired physical strength, lacks appropriate home " exercise program and pain with function  Assessment details: Pt presents to PT with symptoms consistent with lumbar dysfunction, hip soft tissue restriction and mm hypertonicity that has lead to motion restriction.  Pt would benefit from skilled PT intervention to address the deficits noted.     Prognosis: good  Functional Limitations: carrying objects, lifting, sleeping, walking, uncomfortable because of pain, moving in bed, sitting and unable to perform repetitive tasks  Goals  Plan Goals: SHORT TERM GOALS: Time for Goal Achievement: 3 weeks    1.  Patient to be compliant and progression of HEP                             2.  Pain level < 5/10 at worst with mentioned activities to improve function  3.  Increased thoracic, lumbar and SIJ mobility to allow for increased lumbar AROM with less pain.  4.  Increased lumbar AROM to by 5-10 in rotation to allow for increased ease with sit-stand transfers and functional activities.    LONG TERM GOALS: Time for Goal Achievement: 6 weeks  1.  Pt. to score < 15 % on Back Index  2.  Pain level < 2/10 with all listed activities to return to normal.  3.  Lumbar AROM to WFL (60 degrees rot, 40 degrees SB) to allow for return to household & recreational activities w/o increased symptoms.   4.  (B) LE and lower abdominal strength to 5/5 to allow for pushing, pulling and activities to occur without pain (driving, sitting, household  & Job requirements).        Plan  Therapy options: will be seen for skilled physical therapy services  Planned modality interventions: cryotherapy, electrical stimulation/Russian stimulation, TENS and thermotherapy (hydrocollator packs)  Other planned modality interventions: Dry Needling  Planned therapy interventions: body mechanics training, flexibility, functional ROM exercises, home exercise program, manual therapy, neuromuscular re-education, postural training, spinal/joint mobilization, stretching, strengthening and soft tissue  mobilization  Frequency: 2x week  Duration in weeks: 8  Treatment plan discussed with: patient    Manual Therapy:    -     mins  40087;  Therapeutic Exercise:    9     mins  90087;     Neuromuscular Jimmie:    -    mins  09769;    Therapeutic Activity:     -     mins  58180;     Gait Training:      -     mins  25674;     Ultrasound:     -     mins  30617;    Electrical Stimulation:    -     mins  28462 ( );  Dry Needling     -     mins self-pay    Timed Treatment:   9   mins   Total Treatment:     60   mins    PT SIGNATURE: NADEEN Blood License #: [id number]    DATE TREATMENT INITIATED: 7/26/2018    Initial Certification  Certification Period: 10/24/2018  I certify that the therapy services are furnished while this patient is under my care.  The services outlined above are required by this patient, and will be reviewed every 90 days.     PHYSICIAN: Mila Harry PA-C      DATE:     Please sign and return via fax to [phone].. Thank you, [facility].

## 2018-07-25 NOTE — TELEPHONE ENCOUNTER
----- Message from SHASHI Chandler sent at 7/25/2018 10:46 AM EDT -----  Contact: PATIENT  Estrogen sent   ----- Message -----  From: Pricila Field MA  Sent: 7/25/2018  10:25 AM  To: SHASHI Chandler    Patient is wanting to know if you can give her something without the testosterone.     ----- Message -----  From: SHASHI Chandler  Sent: 7/24/2018  11:05 AM  To: Pricila Field MA    I have no recommendations for over the counter. She can quit hormone therapy if she likes. Why does she want to quit the therapy? Should she see her pcp or go to ER. Why is she having all these symptoms?    ----- Message -----  From: Pricila Field MA  Sent: 7/24/2018  10:56 AM  To: SHASHI Chandler        ----- Message -----  From: Bernie Winters  Sent: 7/24/2018  10:02 AM  To: Pricila Field MA    PATIENT CALLED IN REGARDS TO TAKING estrogens, conjugated,-methyltestosterone (ESTRATEST HS) 0.625-1.25 MG per tablet AND WANTS TO KNOW IF SHE CAN BE PRESCRIBED SOMETHING ELSE OR ANY OVER THE COUNTER IDEAS UNTIL SHE CAN GET IN TO SEE DOCTOR IN November AND IS ON THE WAIT LIST. PATIENT IS EXPERIENCING LOTS OF CRYING AND ANXIETY ATTACKS, SHAKY NOT DUE TO SUGAR LEVELS THEIR FINE, VERY EMOTIONAL, WAKES UP FEELING LIKE SHE CAN'T BREATHE. PATIENT WOULD LIKE A CALL BACK AS TOO WHAT SHE CAN DO.    BEST [phone]      Called and informed patient. Pt expressed understanding.

## 2018-07-26 PROCEDURE — G8978 MOBILITY CURRENT STATUS: HCPCS | Performed by: PHYSICAL THERAPIST

## 2018-07-26 PROCEDURE — G8979 MOBILITY GOAL STATUS: HCPCS | Performed by: PHYSICAL THERAPIST

## 2018-07-27 ENCOUNTER — OFFICE VISIT (OUTPATIENT)
Dept: RETAIL CLINIC | Facility: CLINIC | Age: 68
End: 2018-07-27

## 2018-07-27 ENCOUNTER — TREATMENT (OUTPATIENT)
Dept: PHYSICAL THERAPY | Facility: CLINIC | Age: 68
End: 2018-07-27

## 2018-07-27 VITALS
DIASTOLIC BLOOD PRESSURE: 70 MMHG | OXYGEN SATURATION: 97 % | RESPIRATION RATE: 18 BRPM | TEMPERATURE: 97.6 F | HEART RATE: 69 BPM | SYSTOLIC BLOOD PRESSURE: 130 MMHG

## 2018-07-27 DIAGNOSIS — F41.9 ANXIETY: Primary | ICD-10-CM

## 2018-07-27 DIAGNOSIS — M25.551 PAIN OF BOTH HIP JOINTS: ICD-10-CM

## 2018-07-27 DIAGNOSIS — M54.50 CHRONIC BILATERAL LOW BACK PAIN WITHOUT SCIATICA: Primary | ICD-10-CM

## 2018-07-27 DIAGNOSIS — G89.29 CHRONIC BILATERAL LOW BACK PAIN WITHOUT SCIATICA: Primary | ICD-10-CM

## 2018-07-27 DIAGNOSIS — M25.552 PAIN OF BOTH HIP JOINTS: ICD-10-CM

## 2018-07-27 PROCEDURE — 99213 OFFICE O/P EST LOW 20 MIN: CPT | Performed by: NURSE PRACTITIONER

## 2018-07-27 PROCEDURE — 97110 THERAPEUTIC EXERCISES: CPT | Performed by: PHYSICAL THERAPIST

## 2018-07-27 PROCEDURE — 97140 MANUAL THERAPY 1/> REGIONS: CPT | Performed by: PHYSICAL THERAPIST

## 2018-07-27 RX ORDER — HYDROXYZINE HYDROCHLORIDE 25 MG/1
25 TABLET, FILM COATED ORAL 3 TIMES DAILY PRN
Qty: 15 TABLET | Refills: 0 | Status: SHIPPED | OUTPATIENT
Start: 2018-07-27 | End: 2018-08-01

## 2018-07-27 NOTE — PROGRESS NOTES
Subjective:     Jose Keys is a 68 y.o.     Patient presents with c/o anxiey which she believes is hormone related. Her daughter reports that these are full blown panic attacks.The patient has also been under stress the past year related to caregiving and family illness. She reports that this started one week ago and she paces the floor at night. She is unable to get into her endocrinologist until October. She unintentionally missed her appointment with PCP by 15 minutes today and is unable to get into that office for 3 days. She is going to keep that appointment on Monday. She is tearful and hoping for something that will help her rest and calm down for that 3 day period.          The following portions of the patient's history were reviewed and updated as appropriate: allergies, current medications, past family history, past medical history, past social history, past surgical history and problem list.      Review of Systems   Respiratory: Negative.    Cardiovascular:        Hx: hypertension   Psychiatric/Behavioral: Positive for sleep disturbance. Negative for agitation, confusion, self-injury and suicidal ideas. The patient is nervous/anxious.          Objective:      Physical Exam   Constitutional: She appears well-developed and well-nourished.   Cardiovascular: Normal rate, regular rhythm and normal heart sounds.    Pulmonary/Chest: Effort normal and breath sounds normal.   Psychiatric: Her speech is normal and behavior is normal. Judgment and thought content normal. Her mood appears anxious. Cognition and memory are normal.   tearful   Vitals reviewed.          Diagnoses and all orders for this visit:    Anxiety    Other orders  -     hydrOXYzine (ATARAX) 25 MG tablet; Take 1 tablet by mouth 3 (Three) Times a Day As Needed for Itching for up to 5 days.    Keep f/u appointment with PCP on Monday and appointment with endocrinology in October.

## 2018-07-27 NOTE — PATIENT INSTRUCTIONS
Generalized Anxiety Disorder, Adult  Generalized anxiety disorder (DEBRA) is a mental health disorder. People with this condition constantly worry about everyday events. Unlike normal anxiety, worry related to DEBRA is not triggered by a specific event. These worries also do not fade or get better with time. DEBRA interferes with life functions, including relationships, work, and school.  DEBRA can vary from mild to severe. People with severe DEBRA can have intense waves of anxiety with physical symptoms (panic attacks).  What are the causes?  The exact cause of DEBRA is not known.  What increases the risk?  This condition is more likely to develop in:  · Women.  · People who have a family history of anxiety disorders.  · People who are very shy.  · People who experience very stressful life events, such as the death of a loved one.  · People who have a very stressful family environment.    What are the signs or symptoms?  People with DEBRA often worry excessively about many things in their lives, such as their health and family. They may also be overly concerned about:  · Doing well at work.  · Being on time.  · Natural disasters.  · Friendships.    Physical symptoms of DEBRA include:  · Fatigue.  · Muscle tension or having muscle twitches.  · Trembling or feeling shaky.  · Being easily startled.  · Feeling like your heart is pounding or racing.  · Feeling out of breath or like you cannot take a deep breath.  · Having trouble falling asleep or staying asleep.  · Sweating.  · Nausea, diarrhea, or irritable bowel syndrome (IBS).  · Headaches.  · Trouble concentrating or remembering facts.  · Restlessness.  · Irritability.    How is this diagnosed?  Your health care provider can diagnose DEBRA based on your symptoms and medical history. You will also have a physical exam. The health care provider will ask specific questions about your symptoms, including how severe they are, when they started, and if they come and go. Your health care  provider may ask you about your use of alcohol or drugs, including prescription medicines. Your health care provider may refer you to a mental health specialist for further evaluation.  Your health care provider will do a thorough examination and may perform additional tests to rule out other possible causes of your symptoms.  To be diagnosed with DEBRA, a person must have anxiety that:  · Is out of his or her control.  · Affects several different aspects of his or her life, such as work and relationships.  · Causes distress that makes him or her unable to take part in normal activities.  · Includes at least three physical symptoms of DEBRA, such as restlessness, fatigue, trouble concentrating, irritability, muscle tension, or sleep problems.    Before your health care provider can confirm a diagnosis of DEBRA, these symptoms must be present more days than they are not, and they must last for six months or longer.  How is this treated?  The following therapies are usually used to treat DEBRA:  · Medicine. Antidepressant medicine is usually prescribed for long-term daily control. Antianxiety medicines may be added in severe cases, especially when panic attacks occur.  · Talk therapy (psychotherapy). Certain types of talk therapy can be helpful in treating DEBRA by providing support, education, and guidance. Options include:  ? Cognitive behavioral therapy (CBT). People learn coping skills and techniques to ease their anxiety. They learn to identify unrealistic or negative thoughts and behaviors and to replace them with positive ones.  ? Acceptance and commitment therapy (ACT). This treatment teaches people how to be mindful as a way to cope with unwanted thoughts and feelings.  ? Biofeedback. This process trains you to manage your body's response (physiological response) through breathing techniques and relaxation methods. You will work with a therapist while machines are used to monitor your physical symptoms.  · Stress  management techniques. These include yoga, meditation, and exercise.    A mental health specialist can help determine which treatment is best for you. Some people see improvement with one type of therapy. However, other people require a combination of therapies.  Follow these instructions at home:  · Take over-the-counter and prescription medicines only as told by your health care provider.  · Try to maintain a normal routine.  · Try to anticipate stressful situations and allow extra time to manage them.  · Practice any stress management or self-calming techniques as taught by your health care provider.  · Do not punish yourself for setbacks or for not making progress.  · Try to recognize your accomplishments, even if they are small.  · Keep all follow-up visits as told by your health care provider. This is important.  Contact a health care provider if:  · Your symptoms do not get better.  · Your symptoms get worse.  · You have signs of depression, such as:  ? A persistently sad, cranky, or irritable mood.  ? Loss of enjoyment in activities that used to bring you alethea.  ? Change in weight or eating.  ? Changes in sleeping habits.  ? Avoiding friends or family members.  ? Loss of energy for normal tasks.  ? Feelings of guilt or worthlessness.  Get help right away if:  · You have serious thoughts about hurting yourself or others.  If you ever feel like you may hurt yourself or others, or have thoughts about taking your own life, get help right away. You can go to your nearest emergency department or call:  · Your local emergency services (911 in the [place]  · A suicide crisis helpline, such as the National Suicide Prevention Lifeline at 1-101.233.8485. This is open 24 hours a day.    Summary  · Generalized anxiety disorder (DEBRA) is a mental health disorder that involves worry that is not triggered by a specific event.  · People with DEBRA often worry excessively about many things in their lives, such as their health and  family.  · DEBRA may cause physical symptoms such as restlessness, trouble concentrating, sleep problems, frequent sweating, nausea, diarrhea, headaches, and trembling or muscle twitching.  · A mental health specialist can help determine which treatment is best for you. Some people see improvement with one type of therapy. However, other people require a combination of therapies.  This information is not intended to replace advice given to you by your health care provider. Make sure you discuss any questions you have with your health care provider.  Document Released: 04/14/2014 Document Revised: 11/07/2017 Document Reviewed: 11/07/2017  [employer] Interactive Patient Education © 2018 [employer]

## 2018-07-27 NOTE — PROGRESS NOTES
Physical Therapy Daily Progress Note  Visits:2    Subjective : Jose Keys reports: I did my exercises once since my first visit.  No issues with current activity. I didn't sleep well last night due to a panic attack I had between 3am-6am    Objective:   See Exercise, Manual, and Modality Logs for complete treatment.     Assessment/Plan:Pt tolerated treatment progression well.     Progress per Plan of Care and Progress strengthening /stabilization /functional activity     Manual Therapy:    10     mins  19359;  Therapeutic Exercise:    25     mins  89347;     Neuromuscular Jimmie:    -    mins  57531;    Therapeutic Activity:     -     mins  42850;     Gait Training:      -     mins  31484;     Ultrasound:     -     mins  98870;    Electrical Stimulation:    -     mins  15229 ( );  Dry Needling     -     mins self-pay    Timed Treatment:   35   mins   Total Treatment:     35   mins    NADEEN Blood License #[id number]    Physical Therapist

## 2018-07-30 ENCOUNTER — OFFICE VISIT (OUTPATIENT)
Dept: FAMILY MEDICINE CLINIC | Facility: CLINIC | Age: 68
End: 2018-07-30

## 2018-07-30 ENCOUNTER — TREATMENT (OUTPATIENT)
Dept: PHYSICAL THERAPY | Facility: CLINIC | Age: 68
End: 2018-07-30

## 2018-07-30 VITALS
DIASTOLIC BLOOD PRESSURE: 86 MMHG | HEART RATE: 73 BPM | BODY MASS INDEX: 30.99 KG/M2 | WEIGHT: 186 LBS | TEMPERATURE: 97.6 F | SYSTOLIC BLOOD PRESSURE: 145 MMHG | RESPIRATION RATE: 16 BRPM | HEIGHT: 65 IN | OXYGEN SATURATION: 99 %

## 2018-07-30 DIAGNOSIS — M25.551 PAIN OF BOTH HIP JOINTS: ICD-10-CM

## 2018-07-30 DIAGNOSIS — M54.50 CHRONIC BILATERAL LOW BACK PAIN WITHOUT SCIATICA: Primary | ICD-10-CM

## 2018-07-30 DIAGNOSIS — G89.29 CHRONIC BILATERAL LOW BACK PAIN WITHOUT SCIATICA: Primary | ICD-10-CM

## 2018-07-30 DIAGNOSIS — F41.9 ANXIETY: Primary | ICD-10-CM

## 2018-07-30 DIAGNOSIS — M25.552 PAIN OF BOTH HIP JOINTS: ICD-10-CM

## 2018-07-30 PROCEDURE — 97110 THERAPEUTIC EXERCISES: CPT | Performed by: PHYSICAL THERAPIST

## 2018-07-30 PROCEDURE — 99213 OFFICE O/P EST LOW 20 MIN: CPT | Performed by: NURSE PRACTITIONER

## 2018-07-30 PROCEDURE — 97035 APP MDLTY 1+ULTRASOUND EA 15: CPT | Performed by: PHYSICAL THERAPIST

## 2018-07-30 RX ORDER — BUSPIRONE HYDROCHLORIDE 10 MG/1
10 TABLET ORAL 3 TIMES DAILY
Qty: 30 TABLET | Refills: 0 | Status: SHIPPED | OUTPATIENT
Start: 2018-07-30 | End: 2018-08-09 | Stop reason: SDUPTHER

## 2018-07-30 RX ORDER — ESTERIFIED ESTROGEN AND METHYLTESTOSTERONE .625; 1.25 MG/1; MG/1
1 TABLET ORAL DAILY
Qty: 30 TABLET | Refills: 2 | Status: SHIPPED | OUTPATIENT
Start: 2018-07-30 | End: 2018-10-26 | Stop reason: SDUPTHER

## 2018-07-30 RX ORDER — GABAPENTIN 300 MG/1
300 CAPSULE ORAL 3 TIMES DAILY
Qty: 90 CAPSULE | Refills: 2 | Status: SHIPPED | OUTPATIENT
Start: 2018-07-30 | End: 2018-11-09 | Stop reason: SDUPTHER

## 2018-07-30 NOTE — PROGRESS NOTES
Subjective   Jose Keys is a 68 y.o. female.     History of Present Illness   Jose Keys female 68 y.o. who presents today with daughter present for new evaluation and treatment of Anxiety.  She reports panic feeling. Onset of symptoms was approximately several weeks ago.  She denies current suicidal and homicidal ideation. Risk factors are recently running out of estratest and gabapentin.  Previous treatment includes none.      Patient states she missed appt with endocrinology as she had to accompany her daughter to her chemo treatment. She was unable to get another appt with endo until November.  She denies having anxiety issues or panic attacks prior to running out of medications.  She states he has been tearful and very anxious. She has been unable to sleep well and has woken up with panic attack. She was seen by [facility] and given atarax.  She states this helped her sleep but she is unable to take it during the day due to drowsiness.      Patient contacted Molly Dodson and was prescribed premarin until she could get appt.  She has only been on this 3 days but has not noticed any improvement.  Daughter is very concerned about mother's panic and would like her to be started on xanax specifically.              The following portions of the patient's history were reviewed and updated as appropriate: allergies, current medications, past family history, past medical history, past social history, past surgical history and problem list.    Review of Systems   Constitutional: Negative for unexpected weight change.   Respiratory: Negative for shortness of breath.    Cardiovascular: Negative for chest pain and palpitations.   Psychiatric/Behavioral: Positive for agitation, decreased concentration and sleep disturbance. Negative for behavioral problems, dysphoric mood, self-injury and suicidal ideas. The patient is nervous/anxious.        Objective   Physical Exam   Constitutional: She is oriented to person,  "place, and time. She appears well-developed and well-nourished.   Pulmonary/Chest: Effort normal.   Neurological: She is alert and oriented to person, place, and time.   Psychiatric: Her behavior is normal. Judgment and thought content normal. Her mood appears anxious. Her speech is rapid and/or pressured. Cognition and memory are normal.   Tearful during visit   Nursing note and vitals reviewed.      Assessment/Plan   Jose was seen today for anxiety and panic attack.    Diagnoses and all orders for this visit:    Anxiety  -     busPIRone (BUSPAR) 10 MG tablet; Take 1 tablet by mouth 3 (Three) Times a Day.        Discussed case with Dr. Lau who agreed to refill Estartest and gabapentin until the patient was able to f/u with Dr. Denney or Molly Dodson, but did not want to start patient on xanax.      Discussed this with patient and daughter and that buspar would be prescribed to help with anxiety. Discussed it could be taken as needed or daily for better control.  Daughter was very upset that patient would not be prescribed xanax.  When asked why she thought the patient needed that specifically, she stated \"it did not have to be xanax, but some controlled substance.\"  I discussed with daughter that she needed to try the buspar before deciding it would not work for the patient.  Patient seemed willing to try the medication.      Estratest and gabapentin refilled per Dr. Lau until patient is able to f/u with Endocrinology. Patient and daughter aware that this is only short term and will not be prescribed by PCP on an ongoing basis.       "

## 2018-07-30 NOTE — PROGRESS NOTES
Physical Therapy Daily Progress Note  Visits:3    Subjective : Jose Keys reports: I continue to have trouble with sleeping and anxiety.  I pace a lot over night and have not been able to be consistent with my exercises.  I don't know if anything has helped as much yet.  I know I need to do better with my exercises.      Objective:   See Exercise, Manual, and Modality Logs for complete treatment.     Assessment/Plan:Pt requires some redirection on staying alert and on task for TE.  She still needs V/C's for correct exercise performance.  I do believe that with consistency with stretching, she can realize improvements with overall function.      Progress per Plan of Care and Progress strengthening /stabilization /functional activity     Manual Therapy:    -     mins  25948;  Therapeutic Exercise:    20/10     mins  66550;     Neuromuscular Jimmie:    -    mins  26186;    Therapeutic Activity:     -     mins  54925;     Gait Training:      -     mins  29830;     Ultrasound:     16     mins  01014;    Electrical Stimulation:    -     mins  21418 ( );  Dry Needling     -     mins self-pay    Timed Treatment:   35   mins   Total Treatment:     35   mins    NADEEN Blood License #[id number]    Physical Therapist

## 2018-08-02 ENCOUNTER — OFFICE VISIT (OUTPATIENT)
Dept: FAMILY MEDICINE CLINIC | Facility: CLINIC | Age: 68
End: 2018-08-02

## 2018-08-02 ENCOUNTER — TREATMENT (OUTPATIENT)
Dept: PHYSICAL THERAPY | Facility: CLINIC | Age: 68
End: 2018-08-02

## 2018-08-02 VITALS
WEIGHT: 200 LBS | HEIGHT: 65 IN | SYSTOLIC BLOOD PRESSURE: 136 MMHG | BODY MASS INDEX: 33.32 KG/M2 | RESPIRATION RATE: 16 BRPM | TEMPERATURE: 97.9 F | DIASTOLIC BLOOD PRESSURE: 72 MMHG | HEART RATE: 70 BPM

## 2018-08-02 DIAGNOSIS — F41.9 ANXIETY: Primary | ICD-10-CM

## 2018-08-02 DIAGNOSIS — M25.552 PAIN OF BOTH HIP JOINTS: ICD-10-CM

## 2018-08-02 DIAGNOSIS — M25.551 PAIN OF BOTH HIP JOINTS: ICD-10-CM

## 2018-08-02 DIAGNOSIS — M54.50 CHRONIC BILATERAL LOW BACK PAIN WITHOUT SCIATICA: Primary | ICD-10-CM

## 2018-08-02 DIAGNOSIS — G89.29 CHRONIC BILATERAL LOW BACK PAIN WITHOUT SCIATICA: Primary | ICD-10-CM

## 2018-08-02 PROCEDURE — 99213 OFFICE O/P EST LOW 20 MIN: CPT | Performed by: FAMILY MEDICINE

## 2018-08-02 PROCEDURE — 97140 MANUAL THERAPY 1/> REGIONS: CPT | Performed by: PHYSICAL THERAPIST

## 2018-08-02 PROCEDURE — 97110 THERAPEUTIC EXERCISES: CPT | Performed by: PHYSICAL THERAPIST

## 2018-08-02 RX ORDER — METHYLPREDNISOLONE 4 MG/1
TABLET ORAL
Refills: 0 | COMMUNITY
Start: 2018-07-31 | End: 2018-08-02

## 2018-08-02 NOTE — PROGRESS NOTES
Physical Therapy Daily Progress Note  Visits:4    Subjective : Jose Keys reports: I have been on a steroid dose pack of steroid that has seemed to help some.  I'm doing better following the exercises at home.      Objective   See Exercise, Manual, and Modality Logs for complete treatment.     Assessment/Plan:Pt tolerated treatment well and is demonstrating more independence with her program.  She continues to respond positively to ultrasound of the gluteals of the R hip and manual intervention of the thoracolumbar spine.  Would like to progress to WB'ing activity within the next visit.      Progress per Plan of Care and Progress strengthening /stabilization /functional activity     Manual Therapy:    10     mins  36012;  Therapeutic Exercise:    15/15     mins  34944;     Neuromuscular Jimmie:    -    mins  61391;    Therapeutic Activity:     -     mins  98927;     Gait Training:      -     mins  66043;     Ultrasound:     8     mins  52743;    Electrical Stimulation:    -     mins  78839 ( );  Dry Needling     -     mins self-pay    Timed Treatment:   33   mins   Total Treatment:     33   mins    NADEEN Blood License #[id number]    Physical Therapist

## 2018-08-02 NOTE — PROGRESS NOTES
"Subjective   Jose Keys is a 68 y.o. female.     CC: Anxiety    History of Present Illness     Pt returns after seeing Ann on Monday. It seems that the pt missed her appt with her Endocrinologist (who gives her her Estra-Test and Gabapentin) and was rescheduled to see them in November. They wouldn't refill these medications until seen, thus she abruptly ran out and started in with severe anxiety and \"hot flashes\". Ann consulted with me and I agreed, since she had been on these for a long time, to refill them once (until gets in to endocrine). Ann also gave her some BuSpar to help with the anxiety until these medications started working again. Strangely, she only went to the pharmacy and got these yesterday, although were refilled on Monday. While here then, her daughter (who was here that day) kept pushing for \"a controlled substance\" to treat er mother's anxiety. Repeatedly, she was told no, and, when scheduling this appt, was also told that I would not be writing a CSA for her anxiety.      The following portions of the patient's history were reviewed and updated as appropriate: allergies, current medications, past family history, past medical history, past social history, past surgical history and problem list.    Review of Systems   Constitutional: Negative for activity change, chills, fatigue and fever.   Respiratory: Negative for cough and chest tightness.    Cardiovascular: Negative for chest pain and palpitations.   Gastrointestinal: Negative for abdominal pain and nausea.   Endocrine: Negative for cold intolerance.   Psychiatric/Behavioral: Negative for behavioral problems, dysphoric mood and suicidal ideas. The patient is nervous/anxious.      /72   Pulse 70   Temp 97.9 °F (36.6 °C) (Oral)   Resp 16   Ht 165.1 cm (65\")   Wt 90.7 kg (200 lb)   BMI 33.28 kg/m²     Objective   Physical Exam   Constitutional: She appears well-developed and well-nourished.   Neck: Neck supple. No thyromegaly " present.   Cardiovascular: Normal rate and regular rhythm.    No murmur heard.  Pulmonary/Chest: Effort normal and breath sounds normal.   Abdominal: Bowel sounds are normal. There is no tenderness.   Neurological: She is alert.   Psychiatric: She has a normal mood and affect. Her behavior is normal.   Nursing note and vitals reviewed.  Haylie present for the entirety of the visit today.    Assessment/Plan   Jose was seen today for anxiety.    Diagnoses and all orders for this visit:    Anxiety  Comments:  uncontrolled      Pt strongly encouraged to get back on her medication and keep the endocrine appt. Pt can use the BuSpar prn.

## 2018-08-07 ENCOUNTER — TREATMENT (OUTPATIENT)
Dept: PHYSICAL THERAPY | Facility: CLINIC | Age: 68
End: 2018-08-07

## 2018-08-07 DIAGNOSIS — M25.551 PAIN OF BOTH HIP JOINTS: ICD-10-CM

## 2018-08-07 DIAGNOSIS — M54.50 CHRONIC BILATERAL LOW BACK PAIN WITHOUT SCIATICA: Primary | ICD-10-CM

## 2018-08-07 DIAGNOSIS — G89.29 CHRONIC BILATERAL LOW BACK PAIN WITHOUT SCIATICA: Primary | ICD-10-CM

## 2018-08-07 DIAGNOSIS — M25.552 PAIN OF BOTH HIP JOINTS: ICD-10-CM

## 2018-08-07 PROCEDURE — 97140 MANUAL THERAPY 1/> REGIONS: CPT | Performed by: PHYSICAL THERAPIST

## 2018-08-07 PROCEDURE — 97110 THERAPEUTIC EXERCISES: CPT | Performed by: PHYSICAL THERAPIST

## 2018-08-07 RX ORDER — ALLOPURINOL 100 MG/1
100 TABLET ORAL DAILY
Qty: 30 TABLET | Refills: 0 | Status: SHIPPED | OUTPATIENT
Start: 2018-08-07 | End: 2018-09-04 | Stop reason: SDUPTHER

## 2018-08-07 NOTE — PROGRESS NOTES
Physical Therapy Daily Progress Note  Visits:5    Subjective : Jose Keys reports: I experienced an increase in mid-lower back pain the other day.  Almost like a mm spasm but then I had some pain run down to my buttock area.    Objective:   See Exercise, Manual, and Modality Logs for complete treatment.     Assessment/Plan:Pt tolerated treatment well and reported less thoracolumbar discomfort following session.  We were able to progress to more WB'ing activity without incident today.      Progress per Plan of Care     Manual Therapy:    8     mins  97546;  Therapeutic Exercise:    25/15     mins  36074;     Neuromuscular Jimmie:    -    mins  49451;    Therapeutic Activity:     -     mins  07212;     Gait Training:      -     mins  31137;     Ultrasound:     -     mins  34859;    Electrical Stimulation:    -     mins  17681 ( );  Dry Needling     -     mins self-pay    Timed Treatment:   33   mins   Total Treatment:     33   mins    [employer] [employer] License #[id number]    Physical Therapist

## 2018-08-09 ENCOUNTER — TELEPHONE (OUTPATIENT)
Dept: FAMILY MEDICINE CLINIC | Facility: CLINIC | Age: 68
End: 2018-08-09

## 2018-08-09 ENCOUNTER — TREATMENT (OUTPATIENT)
Dept: PHYSICAL THERAPY | Facility: CLINIC | Age: 68
End: 2018-08-09

## 2018-08-09 DIAGNOSIS — G89.29 CHRONIC BILATERAL LOW BACK PAIN WITHOUT SCIATICA: ICD-10-CM

## 2018-08-09 DIAGNOSIS — M54.50 CHRONIC BILATERAL LOW BACK PAIN WITHOUT SCIATICA: ICD-10-CM

## 2018-08-09 DIAGNOSIS — F41.9 ANXIETY: ICD-10-CM

## 2018-08-09 DIAGNOSIS — M25.552 PAIN OF BOTH HIP JOINTS: Primary | ICD-10-CM

## 2018-08-09 DIAGNOSIS — M25.551 PAIN OF BOTH HIP JOINTS: Primary | ICD-10-CM

## 2018-08-09 PROCEDURE — 97110 THERAPEUTIC EXERCISES: CPT | Performed by: PHYSICAL THERAPIST

## 2018-08-09 PROCEDURE — 97140 MANUAL THERAPY 1/> REGIONS: CPT | Performed by: PHYSICAL THERAPIST

## 2018-08-09 RX ORDER — BUSPIRONE HYDROCHLORIDE 10 MG/1
10 TABLET ORAL 3 TIMES DAILY
Qty: 90 TABLET | Refills: 0 | Status: SHIPPED | OUTPATIENT
Start: 2018-08-09 | End: 2018-11-08 | Stop reason: ALTCHOICE

## 2018-08-09 NOTE — PROGRESS NOTES
Physical Therapy Daily Progress Note  Visits:6    Subjective : Jose Keys reports: The hip is doing better but I still get some tightness on the outside of the thigh down toward my knee. I still get a spot in my mid back right below my bra line that still hurts but not as bad.      Objective : Thoracic hypomobility, R costovertebral joint hypomobility.   See Exercise, Manual, and Modality Logs for complete treatment.     Assessment/Plan:Pt tolerated treatment very well today and is showing improved understanding of home program, requiring less cues for TE completion.      Progress per Plan of Care and Progress strengthening /stabilization /functional activity     Manual Therapy:    8     mins  50303;  Therapeutic Exercise:    20/15     mins  37063;     Neuromuscular Jimmie:    -    mins  63861;    Therapeutic Activity:     -     mins  52900;     Gait Training:      -     mins  78982;     Ultrasound:     8     mins  19384;    Electrical Stimulation:    -     mins  02004 ( );  Dry Needling     -     mins self-pay    Timed Treatment:   36   mins   Total Treatment:     36   mins    [employer] [employer] License #[id number]    Physical Therapist

## 2018-08-14 ENCOUNTER — TREATMENT (OUTPATIENT)
Dept: PHYSICAL THERAPY | Facility: CLINIC | Age: 68
End: 2018-08-14

## 2018-08-14 DIAGNOSIS — G89.29 CHRONIC BILATERAL LOW BACK PAIN WITHOUT SCIATICA: ICD-10-CM

## 2018-08-14 DIAGNOSIS — M54.50 CHRONIC BILATERAL LOW BACK PAIN WITHOUT SCIATICA: ICD-10-CM

## 2018-08-14 DIAGNOSIS — M25.552 PAIN OF BOTH HIP JOINTS: Primary | ICD-10-CM

## 2018-08-14 DIAGNOSIS — M25.551 PAIN OF BOTH HIP JOINTS: Primary | ICD-10-CM

## 2018-08-14 PROCEDURE — A9999 DME SUPPLY OR ACCESSORY, NOS: HCPCS | Performed by: PHYSICAL THERAPIST

## 2018-08-14 PROCEDURE — L3485 SHOE HEEL PAD REMOVABLE FOR: HCPCS | Performed by: PHYSICAL THERAPIST

## 2018-08-14 PROCEDURE — 97140 MANUAL THERAPY 1/> REGIONS: CPT | Performed by: PHYSICAL THERAPIST

## 2018-08-14 PROCEDURE — 97110 THERAPEUTIC EXERCISES: CPT | Performed by: PHYSICAL THERAPIST

## 2018-08-14 RX ORDER — LEVOTHYROXINE SODIUM 125 MCG
TABLET ORAL
Qty: 30 TABLET | Refills: 0 | Status: SHIPPED | OUTPATIENT
Start: 2018-08-14 | End: 2018-09-12 | Stop reason: SDUPTHER

## 2018-08-14 NOTE — PROGRESS NOTES
Physical Therapy Daily Progress Note  Visits:7    Subjective : Jose Keys reports: My R hip still pains me when I walk for a while and I still have that spot in my mid R back.      Objective : Supine posture assessment: R leg length discrepancy (shorter than L) ~ 6-7 mm.  R pelvic out flare noted.   See Exercise, Manual, and Modality Logs for complete treatment.     Assessment/Plan:Upon observation of leg length difference, we opted to try using heel lift 1/4 inch on R and experienced very positive results.  She ambulated with much less limp, level trunk and less reported hip pain.  Today's visit did focus more on manual intervention and she responded very well.      Progress per Plan of Care and Progress strengthening /stabilization /functional activity       Manual Therapy:    15     mins  17900;  Therapeutic Exercise:    15/20     mins  61111;     Neuromuscular Jimmie:    -    mins  35381;    Therapeutic Activity:     -     mins  71991;     Gait Training:      -     mins  19918;     Ultrasound:     8     mins  74390;    Electrical Stimulation:    -     mins  30407 ( );  Dry Needling     -     mins self-pay    Timed Treatment:   38   mins   Total Treatment:     38   mins    [employer] [employer] License #[id number]    Physical Therapist

## 2018-08-20 ENCOUNTER — TREATMENT (OUTPATIENT)
Dept: PHYSICAL THERAPY | Facility: CLINIC | Age: 68
End: 2018-08-20

## 2018-08-20 DIAGNOSIS — M25.551 PAIN OF BOTH HIP JOINTS: Primary | ICD-10-CM

## 2018-08-20 DIAGNOSIS — M54.50 CHRONIC BILATERAL LOW BACK PAIN WITHOUT SCIATICA: ICD-10-CM

## 2018-08-20 DIAGNOSIS — G89.29 CHRONIC BILATERAL LOW BACK PAIN WITHOUT SCIATICA: ICD-10-CM

## 2018-08-20 DIAGNOSIS — M25.552 PAIN OF BOTH HIP JOINTS: Primary | ICD-10-CM

## 2018-08-20 PROCEDURE — 97140 MANUAL THERAPY 1/> REGIONS: CPT | Performed by: PHYSICAL THERAPIST

## 2018-08-20 PROCEDURE — 97110 THERAPEUTIC EXERCISES: CPT | Performed by: PHYSICAL THERAPIST

## 2018-08-20 NOTE — PROGRESS NOTES
Physical Therapy Daily Progress Note  Visits:8    Subjective : Jose Keys reports: I have really liked the heel lift and I feel like it has really cut down on how much I sway from side to side.       Objective   See Exercise, Manual, and Modality Logs for complete treatment.     Assessment/Plan:Pt has done very well with treatment progression. Hip restriction is improving as well.      Progress per Plan of Care and Progress strengthening /stabilization /functional activity     Manual Therapy:    12     mins  80134;  Therapeutic Exercise:    25     mins  42386;     Neuromuscular Jimmie:    -    mins  54118;    Therapeutic Activity:     -     mins  25175;     Gait Training:      -     mins  40405;     Ultrasound:     -     mins  22157;    Electrical Stimulation:    -     mins  45782 ( );  Dry Needling     -     mins self-pay    Timed Treatment:   37   mins   Total Treatment:     37   mins    [employer] [employer] License #[id number]    Physical Therapist

## 2018-08-22 ENCOUNTER — TREATMENT (OUTPATIENT)
Dept: PHYSICAL THERAPY | Facility: CLINIC | Age: 68
End: 2018-08-22

## 2018-08-22 DIAGNOSIS — G89.29 CHRONIC BILATERAL LOW BACK PAIN WITHOUT SCIATICA: ICD-10-CM

## 2018-08-22 DIAGNOSIS — M54.50 CHRONIC BILATERAL LOW BACK PAIN WITHOUT SCIATICA: ICD-10-CM

## 2018-08-22 DIAGNOSIS — M25.551 PAIN OF BOTH HIP JOINTS: Primary | ICD-10-CM

## 2018-08-22 DIAGNOSIS — M25.552 PAIN OF BOTH HIP JOINTS: Primary | ICD-10-CM

## 2018-08-22 PROCEDURE — G8979 MOBILITY GOAL STATUS: HCPCS | Performed by: PHYSICAL THERAPIST

## 2018-08-22 PROCEDURE — G8978 MOBILITY CURRENT STATUS: HCPCS | Performed by: PHYSICAL THERAPIST

## 2018-08-22 PROCEDURE — 97110 THERAPEUTIC EXERCISES: CPT | Performed by: PHYSICAL THERAPIST

## 2018-08-22 NOTE — PROGRESS NOTES
Physical Therapy Progress Note  Visits:9    Subjective : Jose Keys reports: I saw my doctor and she wanted me to keep coming to PT for a few more weeks.  I told her that the lift has been very helpful.  The intensity and duration of the pain that I experience have been better overall.  I still have episodes where the hips want to lock up, but the side of the trunk and ribs are better. I   I have been having some panic attacks that have impaired my ability to lie still and not pace enough to do some exercises.    Pain: 4/10 in general day to day.   Back index: 50%, down from 66%   Objective :    Active Range of Motion    Lumbar   Flexion: 75 degrees   Extension: 30 degrees   Left lateral flexion: 35 degrees  Right lateral flexion: 22 degrees  Left rotation: 64 degrees   Right rotation: 60  degrees     AT evaluation:   Lumbar   Flexion: 65 degrees   Extension: 20 degrees   Left lateral flexion: 26 (Pain to ipsilateral paraspinals) degrees with pain  Right lateral flexion: 22 (pain on ipsilateral paraspinals ) degrees with pain  Left rotation: 25 (Muscle tightness) degrees   Right rotation: 30 (Muscle tightness ) degrees      Strength/Myotome Testing    Left Hip   Planes of Motion   Flexion: 4+  Extension: 5-  Abduction: 5-  Adduction: 5-  External rotation: 5-  Internal rotation: 5-     Right Hip   Planes of Motion   Flexion: 4 (hip pain)  Extension: 5-  Abduction: 4+ (mild hip pain)  Adduction: 5  External rotation: 4+ (lateral thigh pain)  Internal rotation: 4- (pain hip)    At evaluation:   Left Hip   Planes of Motion   Flexion: 3+  Extension: 4+  Abduction: 4  Adduction: 4+  External rotation: 4  Internal rotation: 4+     Right Hip   Planes of Motion   Flexion: 3+  Extension: 4+  Abduction: 4  Adduction: 4  External rotation: 4  Internal rotation: 4- (pain)     Ambulation      Observational Gait   Gait:  asymmetric mild  Decreased walking speed and stride length.     Additional Observational Gait  "Details  Decreased coordination with ambulation and 2 episodes of foot crossing mid-line and stagger.       Functional Assessment      Forward Step Up 6\"   Left Leg  Positive Trendelenburg.      Right Leg  Pain, positive Trendelenburg, ipsilateral trunk side bending, increased forward trunk lean and increased contralateral push off.      Single Leg Stance   Left: 20sec.  At eval: 10 seconds  Right: 11 sec. At eval: 4 seconds     Comments  5x Sit to stand:12.89 sec.  At eval: 18 sec.  Some crepitus noted.     TUG test: 10.15 sec   See Exercise, Manual, and Modality Logs for complete treatment.     Assessment/Plan:Pt has been able to meet all the short term goals estabilished with exception of some pain that may exceed 5/10 depending on what she is doing but on average rates her pain at 4/10.  She has improved in all measured aspects of function, but would benefit from ongoing PT to help improve strength and function of the R hip.  Improved strength will allow better gait mechanics and overall function.      Progress per Plan of Care and Progress strengthening /stabilization /functional activity       Manual Therapy:    -     mins  46884;  Therapeutic Exercise:    25     mins  15307;     Neuromuscular Jimmie:    -    mins  21540;    Therapeutic Activity:     12     mins  46907; (Tests and measurements)     Gait Training:      -     mins  46794;     Ultrasound:     -     mins  39421;    Electrical Stimulation:    -     mins  99026 ( );  Dry Needling     -     mins self-pay    Timed Treatment:   37   mins   Total Treatment:     37   mins    [employer] [employer] License #[id number]    Physical Therapist  "

## 2018-08-30 RX ORDER — ERGOCALCIFEROL 1.25 MG/1
CAPSULE ORAL
Qty: 13 CAPSULE | Refills: 0 | Status: SHIPPED | OUTPATIENT
Start: 2018-08-30 | End: 2018-11-09 | Stop reason: SDUPTHER

## 2018-09-04 RX ORDER — ALLOPURINOL 100 MG/1
100 TABLET ORAL DAILY
Qty: 30 TABLET | Refills: 0 | Status: SHIPPED | OUTPATIENT
Start: 2018-09-04 | End: 2018-10-16 | Stop reason: SDUPTHER

## 2018-09-10 ENCOUNTER — TREATMENT (OUTPATIENT)
Dept: PHYSICAL THERAPY | Facility: CLINIC | Age: 68
End: 2018-09-10

## 2018-09-10 DIAGNOSIS — M54.50 CHRONIC BILATERAL LOW BACK PAIN WITHOUT SCIATICA: ICD-10-CM

## 2018-09-10 DIAGNOSIS — M25.552 PAIN OF BOTH HIP JOINTS: Primary | ICD-10-CM

## 2018-09-10 DIAGNOSIS — M25.551 PAIN OF BOTH HIP JOINTS: Primary | ICD-10-CM

## 2018-09-10 DIAGNOSIS — G89.29 CHRONIC BILATERAL LOW BACK PAIN WITHOUT SCIATICA: ICD-10-CM

## 2018-09-10 PROCEDURE — 97140 MANUAL THERAPY 1/> REGIONS: CPT | Performed by: PHYSICAL THERAPIST

## 2018-09-10 PROCEDURE — 97110 THERAPEUTIC EXERCISES: CPT | Performed by: PHYSICAL THERAPIST

## 2018-09-10 PROCEDURE — 97035 APP MDLTY 1+ULTRASOUND EA 15: CPT | Performed by: PHYSICAL THERAPIST

## 2018-09-10 NOTE — PROGRESS NOTES
Physical Therapy Daily Progress Note  Visits:10    Subjective : Jose Keys reports: I'm feeling pretty good overall.  I have been able to keep up my exercises on a fairly regular basis.  The back has seemed to clear up but the R hip still pains me after walking about 5-10 min or so.      Objective   See Exercise, Manual, and Modality Logs for complete treatment.     Assessment/Plan:Pt did very well with PT intervention and we were able to progress hip strengthening activity to help promote better gait and WB'ing tolerances.      Progress per Plan of Care and Progress strengthening /stabilization /functional activity     Manual Therapy:    12     mins  02373;  Therapeutic Exercise:    35     mins  39342;     Neuromuscular Jimmie:    4    mins  34956;    Therapeutic Activity:     -     mins  82965;     Gait Training:      -     mins  56437;     Ultrasound:     8     mins  05666;    Electrical Stimulation:    -     mins  33030 ( );  Dry Needling     -     mins self-pay    Timed Treatment:   59   mins   Total Treatment:     59   mins    [employer] [employer] License #[id number]    Physical Therapist

## 2018-09-12 ENCOUNTER — TREATMENT (OUTPATIENT)
Dept: PHYSICAL THERAPY | Facility: CLINIC | Age: 68
End: 2018-09-12

## 2018-09-12 DIAGNOSIS — M25.552 PAIN OF BOTH HIP JOINTS: Primary | ICD-10-CM

## 2018-09-12 DIAGNOSIS — M54.50 CHRONIC BILATERAL LOW BACK PAIN WITHOUT SCIATICA: ICD-10-CM

## 2018-09-12 DIAGNOSIS — M25.551 PAIN OF BOTH HIP JOINTS: Primary | ICD-10-CM

## 2018-09-12 DIAGNOSIS — G89.29 CHRONIC BILATERAL LOW BACK PAIN WITHOUT SCIATICA: ICD-10-CM

## 2018-09-12 PROCEDURE — 97110 THERAPEUTIC EXERCISES: CPT | Performed by: PHYSICAL THERAPIST

## 2018-09-12 RX ORDER — LEVOTHYROXINE SODIUM 125 MCG
TABLET ORAL
Qty: 30 TABLET | Refills: 0 | Status: SHIPPED | OUTPATIENT
Start: 2018-09-12 | End: 2018-10-13 | Stop reason: SDUPTHER

## 2018-09-12 NOTE — PROGRESS NOTES
Physical Therapy Daily Progress Note  Visits:11    Subjective : Jose Keys reports: Still doing pretty good today.  The last visit I had was helpful.      Objective   See Exercise, Manual, and Modality Logs for complete treatment.     Assessment/Plan:Pt tolerated treatment well overall and performs new strengthening exercises well.      Progress per Plan of Care and Progress strengthening /stabilization /functional activity     Manual Therapy:    -     mins  86374;  Therapeutic Exercise:    25     mins  44030;     Neuromuscular Jimmie:    -    mins  75808;    Therapeutic Activity:     -     mins  01273;     Gait Training:      -     mins  37428;     Ultrasound:     8     mins  18024;    Electrical Stimulation:    -     mins  73280 ( );  Dry Needling     -     mins self-pay    Timed Treatment:   33   mins   Total Treatment:     33   mins    Julio C Borges PT  [place] License #[id number]    Physical Therapist

## 2018-09-17 ENCOUNTER — TREATMENT (OUTPATIENT)
Dept: PHYSICAL THERAPY | Facility: CLINIC | Age: 68
End: 2018-09-17

## 2018-09-17 DIAGNOSIS — M25.551 PAIN OF BOTH HIP JOINTS: Primary | ICD-10-CM

## 2018-09-17 DIAGNOSIS — G89.29 CHRONIC BILATERAL LOW BACK PAIN WITHOUT SCIATICA: ICD-10-CM

## 2018-09-17 DIAGNOSIS — M54.50 CHRONIC BILATERAL LOW BACK PAIN WITHOUT SCIATICA: ICD-10-CM

## 2018-09-17 DIAGNOSIS — M25.552 PAIN OF BOTH HIP JOINTS: Primary | ICD-10-CM

## 2018-09-17 PROCEDURE — 97110 THERAPEUTIC EXERCISES: CPT | Performed by: PHYSICAL THERAPIST

## 2018-09-17 PROCEDURE — 97140 MANUAL THERAPY 1/> REGIONS: CPT | Performed by: PHYSICAL THERAPIST

## 2018-09-17 NOTE — PROGRESS NOTES
"Physical Therapy Daily Progress Note  Visits:12    Subjective : Jose Keys reports: I did pretty good up to the weekend.  I went to a festival and did a lot of walking.  It bothered the hip some, so I have been a little more \"limpy\".      Objective   See Exercise, Manual, and Modality Logs for complete treatment.     Assessment/Plan:Today's visit did include more manual intervention to reduce hip compression and mm tension.  She responded well to treatment and was able report less pain following session.  Progress per Plan of Care and Progress strengthening /stabilization /functional activity     Manual Therapy:    15     mins  41860;  Therapeutic Exercise:    20/18    mins  89980;     Neuromuscular Jimmie:    -    mins  93929;    Therapeutic Activity:     -     mins  92312;     Gait Training:      -     mins  50325;     Ultrasound:     -     mins  95246;    Electrical Stimulation:    -     mins  52879 ( );  Dry Needling     -     mins self-pay    Timed Treatment:   35   mins   Total Treatment:     45   mins    [employer] [employer] License #[id number]    Physical Therapist  "

## 2018-09-26 ENCOUNTER — TREATMENT (OUTPATIENT)
Dept: PHYSICAL THERAPY | Facility: CLINIC | Age: 68
End: 2018-09-26

## 2018-09-26 DIAGNOSIS — M25.551 PAIN OF BOTH HIP JOINTS: Primary | ICD-10-CM

## 2018-09-26 DIAGNOSIS — M25.552 PAIN OF BOTH HIP JOINTS: Primary | ICD-10-CM

## 2018-09-26 DIAGNOSIS — M54.50 CHRONIC BILATERAL LOW BACK PAIN WITHOUT SCIATICA: ICD-10-CM

## 2018-09-26 DIAGNOSIS — G89.29 CHRONIC BILATERAL LOW BACK PAIN WITHOUT SCIATICA: ICD-10-CM

## 2018-09-26 PROCEDURE — 97140 MANUAL THERAPY 1/> REGIONS: CPT | Performed by: PHYSICAL THERAPIST

## 2018-09-26 PROCEDURE — 97110 THERAPEUTIC EXERCISES: CPT | Performed by: PHYSICAL THERAPIST

## 2018-09-26 NOTE — PROGRESS NOTES
Physical Therapy Daily Progress Note  Visits:13    Subjective : Jose Keys reports: I am doing better today than I was last week when I came.  I didn't have as busy of a week.      Objective   See Exercise, Manual, and Modality Logs for complete treatment.     Assessment/Plan:Pt did very well with session today and we did progress stability training activity.      Progress per Plan of Care and Progress strengthening /stabilization /functional activity     Manual Therapy:    10     mins  88548;  Therapeutic Exercise:    20/15     mins  02581;     Neuromuscular Jimmie:    4    mins  66027;    Therapeutic Activity:     -     mins  82357;     Gait Training:      -     mins  19653;     Ultrasound:     -     mins  06781;    Electrical Stimulation:    -     mins  52599 ( );  Dry Needling     -     mins self-pay    Timed Treatment:   34   mins   Total Treatment:     34   mins    [employer] [employer] License #[id number]    Physical Therapist

## 2018-10-03 ENCOUNTER — TREATMENT (OUTPATIENT)
Dept: PHYSICAL THERAPY | Facility: CLINIC | Age: 68
End: 2018-10-03

## 2018-10-03 DIAGNOSIS — M25.552 PAIN OF BOTH HIP JOINTS: Primary | ICD-10-CM

## 2018-10-03 DIAGNOSIS — M25.551 PAIN OF BOTH HIP JOINTS: Primary | ICD-10-CM

## 2018-10-03 DIAGNOSIS — G89.29 CHRONIC BILATERAL LOW BACK PAIN WITHOUT SCIATICA: ICD-10-CM

## 2018-10-03 DIAGNOSIS — M54.50 CHRONIC BILATERAL LOW BACK PAIN WITHOUT SCIATICA: ICD-10-CM

## 2018-10-03 PROCEDURE — 97110 THERAPEUTIC EXERCISES: CPT | Performed by: PHYSICAL THERAPIST

## 2018-10-03 PROCEDURE — 97140 MANUAL THERAPY 1/> REGIONS: CPT | Performed by: PHYSICAL THERAPIST

## 2018-10-10 ENCOUNTER — TREATMENT (OUTPATIENT)
Dept: PHYSICAL THERAPY | Facility: CLINIC | Age: 68
End: 2018-10-10

## 2018-10-10 DIAGNOSIS — M25.551 PAIN OF BOTH HIP JOINTS: Primary | ICD-10-CM

## 2018-10-10 DIAGNOSIS — M54.50 CHRONIC BILATERAL LOW BACK PAIN WITHOUT SCIATICA: ICD-10-CM

## 2018-10-10 DIAGNOSIS — M25.552 PAIN OF BOTH HIP JOINTS: Primary | ICD-10-CM

## 2018-10-10 DIAGNOSIS — G89.29 CHRONIC BILATERAL LOW BACK PAIN WITHOUT SCIATICA: ICD-10-CM

## 2018-10-10 PROCEDURE — 97110 THERAPEUTIC EXERCISES: CPT | Performed by: PHYSICAL THERAPIST

## 2018-10-10 PROCEDURE — G8978 MOBILITY CURRENT STATUS: HCPCS | Performed by: PHYSICAL THERAPIST

## 2018-10-10 PROCEDURE — G8979 MOBILITY GOAL STATUS: HCPCS | Performed by: PHYSICAL THERAPIST

## 2018-10-10 PROCEDURE — G8980 MOBILITY D/C STATUS: HCPCS | Performed by: PHYSICAL THERAPIST

## 2018-10-10 NOTE — PROGRESS NOTES
Physical Therapy Discharge Note  Visits:15    Subjective : Jose Keys reports: I ended up not having my MRI this past week.  I freaked out a little bit with having it done, even with the medication to help with the anxiety.  I even had the open MRI and couldn't do it.  Beyond that, I feel better today.  The positional distraction has been very helpful.  I'm walking faster and can keep up with walking with a group.  I feel much better from coming to PT.      Objective:  Active Range of Motion   Lumbar   Flexion: 85 degrees   Extension: 35 degrees   Left lateral flexion: 38 degrees  Right lateral flexion: 40 degrees  Left rotation: 65 degrees   Right rotation: 62  degrees      Lumbar 8-22-18  Flexion: 75 degrees   Extension: 30 degrees   Left lateral flexion: 35 degrees  Right lateral flexion: 22 degrees  Left rotation: 64 degrees   Right rotation: 60  degrees      AT evaluation:   Lumbar   Flexion: 65 degrees   Extension: 20 degrees   Left lateral flexion: 26 (Pain to ipsilateral paraspinals) degrees with pain  Right lateral flexion: 22 (pain on ipsilateral paraspinals ) degrees with pain  Left rotation: 25 (Muscle tightness) degrees   Right rotation: 30 (Muscle tightness ) degrees      Strength/Myotome Testing   Current   Left Hip   Planes of Motion   Flexion: 5  Extension: 5-  Abduction: 5-  Adduction: 5-  External rotation: 5-  Internal rotation: 5-     Right Hip   Planes of Motion   Flexion: 5 (hip pain)  Extension: 5-  Abduction: 5-  Adduction: 5  External rotation: 5-   Internal rotation: 5      At evaluation:   Left Hip   Planes of Motion   Flexion: 3+  Extension: 4+  Abduction: 4  Adduction: 4+  External rotation: 4  Internal rotation: 4+     Right Hip   Planes of Motion   Flexion: 3+  Extension: 4+  Abduction: 4  Adduction: 4  External rotation: 4  Internal rotation: 4- (pain)     Ambulation      Observational Gait   Gait:  asymmetric mild  Decreased walking speed and stride length.     Additional  Observational Gait Details  Decreased coordination with ambulation and 2 episodes of foot crossing mid-line and stagger.       Functional Assessment     Comments  5x Sit to stand: Current: 9.16 sec 8-22-18: 12.89 sec.  At eval: 18 sec.  Some crepitus noted.     TUG test: Current:  7.10 sec.  8-22-18: 10.15 sec   See Exercise, Manual, and Modality Logs for complete treatment.      Assessment/Plan:Pt was able to achieve all of her established STG and LTG to regain functionality and independence.  She has an excellent understanding of HEP and compliance and will be discharged from PT.      Other     Manual Therapy:    -     mins  08268;  Therapeutic Exercise:    25     mins  25491;     Neuromuscular Jimmie:    -    mins  61478;    Therapeutic Activity:     10    mins  62746;   Tests and measures   Gait Training:      -     mins  24223;     Ultrasound:     -     mins  15941;    Electrical Stimulation:    -     mins  38201 ( );  Dry Needling     -     mins self-pay    Timed Treatment:   35   mins   Total Treatment:     35   mins    Julio C Borges PT  [place] License #[id number]    Physical Therapist

## 2018-10-15 RX ORDER — LEVOTHYROXINE SODIUM 125 MCG
TABLET ORAL
Qty: 30 TABLET | Refills: 0 | Status: SHIPPED | OUTPATIENT
Start: 2018-10-15 | End: 2018-11-09 | Stop reason: SDUPTHER

## 2018-10-17 RX ORDER — ALLOPURINOL 100 MG/1
100 TABLET ORAL DAILY
Qty: 30 TABLET | Refills: 0 | Status: SHIPPED | OUTPATIENT
Start: 2018-10-17 | End: 2018-11-09 | Stop reason: SDUPTHER

## 2018-10-30 VITALS
DIASTOLIC BLOOD PRESSURE: 71 MMHG | DIASTOLIC BLOOD PRESSURE: 61 MMHG | WEIGHT: 185 LBS | HEART RATE: 57 BPM | DIASTOLIC BLOOD PRESSURE: 60 MMHG | SYSTOLIC BLOOD PRESSURE: 102 MMHG | HEART RATE: 66 BPM | OXYGEN SATURATION: 94 % | DIASTOLIC BLOOD PRESSURE: 63 MMHG | RESPIRATION RATE: 17 BRPM | HEART RATE: 62 BPM | SYSTOLIC BLOOD PRESSURE: 126 MMHG | DIASTOLIC BLOOD PRESSURE: 59 MMHG | OXYGEN SATURATION: 95 % | SYSTOLIC BLOOD PRESSURE: 123 MMHG | OXYGEN SATURATION: 96 % | SYSTOLIC BLOOD PRESSURE: 96 MMHG | SYSTOLIC BLOOD PRESSURE: 144 MMHG | HEART RATE: 58 BPM | SYSTOLIC BLOOD PRESSURE: 105 MMHG | RESPIRATION RATE: 15 BRPM | SYSTOLIC BLOOD PRESSURE: 152 MMHG | DIASTOLIC BLOOD PRESSURE: 54 MMHG | HEART RATE: 61 BPM | RESPIRATION RATE: 16 BRPM | RESPIRATION RATE: 18 BRPM | HEIGHT: 65 IN | DIASTOLIC BLOOD PRESSURE: 50 MMHG | OXYGEN SATURATION: 98 % | DIASTOLIC BLOOD PRESSURE: 62 MMHG | RESPIRATION RATE: 20 BRPM | OXYGEN SATURATION: 91 % | HEART RATE: 55 BPM | OXYGEN SATURATION: 100 % | SYSTOLIC BLOOD PRESSURE: 110 MMHG | SYSTOLIC BLOOD PRESSURE: 116 MMHG | DIASTOLIC BLOOD PRESSURE: 73 MMHG | RESPIRATION RATE: 19 BRPM | TEMPERATURE: 97.7 F | HEART RATE: 53 BPM | HEART RATE: 70 BPM | TEMPERATURE: 974.2 F

## 2018-10-31 ENCOUNTER — OFFICE (AMBULATORY)
Dept: URBAN - METROPOLITAN AREA PATHOLOGY 4 | Facility: PATHOLOGY | Age: 68
End: 2018-10-31

## 2018-10-31 ENCOUNTER — AMBULATORY SURGICAL CENTER (AMBULATORY)
Dept: URBAN - METROPOLITAN AREA SURGERY 17 | Facility: SURGERY | Age: 68
End: 2018-10-31

## 2018-10-31 DIAGNOSIS — K62.1 RECTAL POLYP: ICD-10-CM

## 2018-10-31 DIAGNOSIS — K21.9 GASTRO-ESOPHAGEAL REFLUX DISEASE WITHOUT ESOPHAGITIS: ICD-10-CM

## 2018-10-31 DIAGNOSIS — K29.50 UNSPECIFIED CHRONIC GASTRITIS WITHOUT BLEEDING: ICD-10-CM

## 2018-10-31 DIAGNOSIS — Z80.0 FAMILY HISTORY OF MALIGNANT NEOPLASM OF DIGESTIVE ORGANS: ICD-10-CM

## 2018-10-31 DIAGNOSIS — K29.70 GASTRITIS, UNSPECIFIED, WITHOUT BLEEDING: ICD-10-CM

## 2018-10-31 LAB
GI HISTOLOGY: A. SELECT: (no result)
GI HISTOLOGY: B. UNSPECIFIED: (no result)
GI HISTOLOGY: PDF REPORT: (no result)

## 2018-10-31 PROCEDURE — 45380 COLONOSCOPY AND BIOPSY: CPT | Mod: PT | Performed by: INTERNAL MEDICINE

## 2018-10-31 PROCEDURE — 43239 EGD BIOPSY SINGLE/MULTIPLE: CPT | Mod: 59 | Performed by: INTERNAL MEDICINE

## 2018-10-31 PROCEDURE — 88305 TISSUE EXAM BY PATHOLOGIST: CPT | Performed by: INTERNAL MEDICINE

## 2018-11-08 ENCOUNTER — OFFICE VISIT (OUTPATIENT)
Dept: ENDOCRINOLOGY | Age: 68
End: 2018-11-08

## 2018-11-08 VITALS
DIASTOLIC BLOOD PRESSURE: 64 MMHG | SYSTOLIC BLOOD PRESSURE: 130 MMHG | BODY MASS INDEX: 32.49 KG/M2 | WEIGHT: 195 LBS | HEIGHT: 65 IN

## 2018-11-08 DIAGNOSIS — I10 ESSENTIAL HYPERTENSION: ICD-10-CM

## 2018-11-08 DIAGNOSIS — G62.9 NEUROPATHY: ICD-10-CM

## 2018-11-08 DIAGNOSIS — E55.9 VITAMIN D DEFICIENCY: ICD-10-CM

## 2018-11-08 DIAGNOSIS — E78.49 OTHER HYPERLIPIDEMIA: ICD-10-CM

## 2018-11-08 DIAGNOSIS — E11.8 CONTROLLED TYPE 2 DIABETES MELLITUS WITH COMPLICATION, WITHOUT LONG-TERM CURRENT USE OF INSULIN (HCC): Primary | ICD-10-CM

## 2018-11-08 DIAGNOSIS — E79.0 HYPERURICEMIA: ICD-10-CM

## 2018-11-08 DIAGNOSIS — E03.9 HYPOTHYROIDISM, UNSPECIFIED TYPE: ICD-10-CM

## 2018-11-08 PROCEDURE — 99214 OFFICE O/P EST MOD 30 MIN: CPT | Performed by: NURSE PRACTITIONER

## 2018-11-08 RX ORDER — ESCITALOPRAM OXALATE 20 MG/1
20 TABLET ORAL DAILY
COMMUNITY
End: 2019-11-08 | Stop reason: SDUPTHER

## 2018-11-08 NOTE — PROGRESS NOTES
"Subjective   Jose Keys is a 68 y.o. female is here today for follow-up.  Chief Complaint   Patient presents with   • Diabetes     No recent labs, pt tests BG 1-2x daily, pt brought meter   • Hyperlipidemia   • Hypertension   • Vitamin D Deficiency     /64   Ht 165.1 cm (65\")   Wt 88.5 kg (195 lb)   BMI 32.45 kg/m²   Current Outpatient Prescriptions on File Prior to Visit   Medication Sig   • ACCU-CHEK FASTCLIX LANCETS misc Use to test BG 2x daily. DX CODE: E11.65   • allopurinol (ZYLOPRIM) 100 MG tablet TAKE 1 TABLET BY MOUTH DAILY   • Alpha-Lipoic Acid 600 MG capsule Take  by mouth.   • amLODIPine-valsartan (EXFORGE)  MG per tablet Take 1 tablet by mouth Daily.   • ASPIRIN LOW DOSE 81 MG EC tablet Take 81 mg by mouth Daily.   • carvedilol (COREG) 3.125 MG tablet Take 1 tablet by mouth 2 (Two) Times a Day With Meals.   • cetirizine (zyrTEC) 5 MG tablet Take 5 mg by mouth.   • EST ESTROGENS-METHYLTEST HS 0.625-1.25 MG per tablet TAKE 1 TABLET BY MOUTH DAILY   • gabapentin (NEURONTIN) 300 MG capsule Take 1 capsule by mouth 3 (Three) Times a Day.   • glucose blood (ACCU-CHEK SMARTVIEW) test strip Use to test Bg 1 time daily   • Lancet Devices (ACCU-CHEK SOFTCLIX) lancets Use as instructed   • metFORMIN (GLUCOPHAGE) 1000 MG tablet TAKE 1 TABLET BY MOUTH TWICE DAILY FOR DIABETES (Patient taking differently: TAKE 1 TABLET BY MOUTH DAILY FOR DIABETES)   • Multiple Vitamins-Minerals (MULTIVITAMIN PO) Take 1 tablet by mouth daily.   • Multiple Vitamins-Minerals (OCUVITE PO) Take 1 tablet by mouth daily.   • SYNTHROID 125 MCG tablet TAKE 1 TABLET BY MOUTH DAILY   • vitamin D (ERGOCALCIFEROL) 55557 units capsule capsule TAKE 1 CAPSULE BY MOUTH EVERY 7 DAYS   • [DISCONTINUED] busPIRone (BUSPAR) 10 MG tablet Take 1 tablet by mouth 3 (Three) Times a Day.   • [DISCONTINUED] [facility] EVENING PRIMROSE OIL PO Take  by mouth.     No current facility-administered medications on file prior to visit.      Family History "   Problem Relation Age of Onset   • Diabetes Father    • Hypertension Father    • Diabetes Sister      Social History   Substance Use Topics   • Smoking status: Never Smoker   • Smokeless tobacco: Never Used   • Alcohol use Yes      Comment: socially      Allergies   Allergen Reactions   • Atorvastatin    • Azithromycin    • Erythromycin    • Ezetimibe-Simvastatin    • Hydrocodone-Acetaminophen    • Praluent [Alirocumab]    • Ropinirole Hcl    • Simvastatin    • Statins    • Sulfa Antibiotics    • Sulfur    • Zetia [Ezetimibe]          History of Present Illness  Encounter Diagnoses   Name Primary?   • Other hyperlipidemia Yes   • Essential hypertension    • Vitamin D deficiency    • Controlled type 2 diabetes mellitus with complication, without long-term current use of insulin (CMS/[facility])    • Hypothyroidism, unspecified type    • Neuropathy    • Hyperuricemia    68-year-old female patient here today for routine follow-up visit.  She is being seen for the above-mentioned problems.  She's not been seen in almost a year and this office.  She states she had a cancel her last appointment.  Her daughter being diagnosed with cancer.  As result she had controlled substances that were not filled from this office.  She is here today for a routine follow-up visit however she did see her primary care provider and it was refilled for her gabapentin and her Estratest until she could get to this office.  A Isra was reviewed at today's visit.  Her prescription refills will be sent to her pharmacy.  She was tearful and anxious at today's visit and states she has been dealing with anxiety.  She has seen her primary care provider as well as a mental health facility.  She is checking her blood sugars several times a week according to her meter download.  She has had normal blood sugars according to her readings are she's had no hypoglycemic events.    The following portions of the patient's history were reviewed and updated as  appropriate: allergies, current medications, past family history, past medical history, past social history, past surgical history and problem list.    Review of Systems   Constitutional: Negative for fatigue.   HENT: Negative for trouble swallowing.    Eyes: Negative for visual disturbance.   Respiratory: Negative for shortness of breath.    Cardiovascular: Negative for leg swelling.   Endocrine: Negative for polyuria.   Skin: Negative for wound.   Neurological: Negative for numbness.       Objective   Physical Exam   Constitutional: She is oriented to person, place, and time. She appears well-developed and well-nourished. No distress.   Tearful and anxious   HENT:   Head: Normocephalic and atraumatic.   Right Ear: External ear normal.   Left Ear: External ear normal.   Nose: Nose normal.   Mouth/Throat: Oropharynx is clear and moist. No oropharyngeal exudate.   Eyes: Pupils are equal, round, and reactive to light. EOM are normal. Right eye exhibits no discharge. Left eye exhibits no discharge.   exopthalic ou   Neck: Trachea normal, normal range of motion and full passive range of motion without pain. Neck supple. No tracheal tenderness present. Carotid bruit is not present. No tracheal deviation, no edema and no erythema present. No thyroid mass and no thyromegaly present.   Cardiovascular: Normal rate, regular rhythm, normal heart sounds and intact distal pulses.  Exam reveals no gallop and no friction rub.    No murmur heard.  Pulmonary/Chest: Effort normal and breath sounds normal. No stridor. No respiratory distress. She has no wheezes. She has no rales.   Abdominal: Soft. Bowel sounds are normal. She exhibits no distension.   Musculoskeletal: Normal range of motion. She exhibits no edema or deformity.   Lymphadenopathy:     She has no cervical adenopathy.   Neurological: She is alert and oriented to person, place, and time.   Skin: Skin is warm and dry. No rash noted. She is not diaphoretic. No erythema. No  pallor.   Psychiatric: She has a normal mood and affect. Her behavior is normal. Judgment and thought content normal.   Nursing note and vitals reviewed.      Results for orders placed or performed in visit on 12/13/17   Comprehensive Metabolic Panel   Result Value Ref Range    Glucose 109 (H) 65 - 99 mg/dL    BUN 10 8 - 23 mg/dL    Creatinine 0.62 0.57 - 1.00 mg/dL    eGFR Non African Am 96 >60 mL/min/1.73    eGFR African Am 116 >60 mL/min/1.73    BUN/Creatinine Ratio 16.1 7.0 - 25.0    Sodium 143 136 - 145 mmol/L    Potassium 4.6 3.5 - 5.2 mmol/L    Chloride 103 98 - 107 mmol/L    Total CO2 26.7 22.0 - 29.0 mmol/L    Calcium 9.2 8.6 - 10.5 mg/dL    Total Protein 7.0 6.0 - 8.5 g/dL    Albumin 4.40 3.50 - 5.20 g/dL    Globulin 2.6 gm/dL    A/G Ratio 1.7 g/dL    Total Bilirubin 0.4 0.1 - 1.2 mg/dL    Alkaline Phosphatase 80 39 - 117 U/L    AST (SGOT) 19 1 - 32 U/L    ALT (SGPT) 29 1 - 33 U/L   Comprehensive Thyroglobulin   Result Value Ref Range    Thyroglobulin Ab <1.0 IU/mL    Thyroglobulin 7.0 ng/mL    Thyroglobulin (TG-HARSH) Comment ng/mL   C-Peptide   Result Value Ref Range    C-Peptide 4.7 (H) 1.1 - 4.4 ng/mL   Hemoglobin A1c   Result Value Ref Range    Hemoglobin A1C 6.66 (H) 4.80 - 5.60 %   Lipid Panel   Result Value Ref Range    Total Cholesterol 264 (H) 0 - 200 mg/dL    Triglycerides 246 (H) 0 - 150 mg/dL    HDL Cholesterol 38 (L) 40 - 60 mg/dL    VLDL Cholesterol 49.2 (H) 5 - 40 mg/dL    LDL Cholesterol  177 (H) 0 - 100 mg/dL   T3, Free   Result Value Ref Range    T3, Free 3.4 2.0 - 4.4 pg/mL   T4, Free   Result Value Ref Range    Free T4 1.76 (H) 0.93 - 1.70 ng/dL   Thyroid Panel With TSH   Result Value Ref Range    TSH 0.429 (L) 0.450 - 4.500 uIU/mL    T4, Total 8.7 4.5 - 12.0 ug/dL    T3 Uptake 30 24 - 39 %    Free Thyroxine Index 2.6 1.2 - 4.9   Uric Acid   Result Value Ref Range    Uric Acid 5.0 2.4 - 5.7 mg/dL   Vitamin D 25 Hydroxy   Result Value Ref Range    25 Hydroxy, Vitamin D 55.3 30.0 - 100.0  ng/ml   MicroAlbumin, Urine, Random   Result Value Ref Range    Microalbumin, Urine 11.4 Not Estab. ug/mL   Cardiovascular Risk Assessment   Result Value Ref Range    Interpretation Note    Diabetes Patient Education   Result Value Ref Range    PDF Image Not applicable        Assessment/Plan   Problems Addressed this Visit        Cardiovascular and Mediastinum    Hyperlipidemia - Primary    Relevant Medications    escitalopram (LEXAPRO) 20 MG tablet    Hypertension    Relevant Medications    escitalopram (LEXAPRO) 20 MG tablet       Digestive    Vitamin D deficiency    Relevant Medications    escitalopram (LEXAPRO) 20 MG tablet       Endocrine    Hypothyroidism    Relevant Medications    escitalopram (LEXAPRO) 20 MG tablet    Controlled type 2 diabetes mellitus (CMS/HCC)    Relevant Medications    escitalopram (LEXAPRO) 20 MG tablet       Nervous and Auditory    Neuropathy    Relevant Medications    escitalopram (LEXAPRO) 20 MG tablet       Other    Hyperuricemia    Relevant Medications    escitalopram (LEXAPRO) 20 MG tablet        In summary, patient was seen and examined.  Metabolically she is stable.  She is taking her medications as prescribed.  Her refills will be sent to her pharmacy for 90 days per her request.  Isra was reviewed at today's visit.  She will have extensive labs done at today's visit will be notified of the results along with any further recommendations.  Her blood pressure is an satisfactory range.  She will follow-up with Dr. Denney or myself in 6 months.  She was offered a flu shot at todays visit but states she is allergic to eggs.

## 2018-11-09 RX ORDER — LEVOTHYROXINE SODIUM 125 MCG
125 TABLET ORAL DAILY
Qty: 90 TABLET | Refills: 1 | Status: SHIPPED | OUTPATIENT
Start: 2018-11-09 | End: 2019-05-08 | Stop reason: SDUPTHER

## 2018-11-09 RX ORDER — GABAPENTIN 300 MG/1
300 CAPSULE ORAL 3 TIMES DAILY
Qty: 270 CAPSULE | Refills: 0 | OUTPATIENT
Start: 2018-11-09 | End: 2019-02-07 | Stop reason: SDUPTHER

## 2018-11-09 RX ORDER — ALLOPURINOL 100 MG/1
100 TABLET ORAL DAILY
Qty: 90 TABLET | Refills: 1 | Status: SHIPPED | OUTPATIENT
Start: 2018-11-09 | End: 2019-05-08 | Stop reason: SDUPTHER

## 2018-11-09 RX ORDER — ERGOCALCIFEROL 1.25 MG/1
50000 CAPSULE ORAL
Qty: 13 CAPSULE | Refills: 1 | Status: SHIPPED | OUTPATIENT
Start: 2018-11-09 | End: 2019-05-05 | Stop reason: SDUPTHER

## 2018-11-09 RX ORDER — LANCETS
EACH MISCELLANEOUS
Qty: 200 EACH | Refills: 1 | Status: SHIPPED | OUTPATIENT
Start: 2018-11-09

## 2018-11-10 LAB
25(OH)D3+25(OH)D2 SERPL-MCNC: 41.1 NG/ML (ref 30–100)
ALBUMIN SERPL-MCNC: 4.4 G/DL (ref 3.5–5.2)
ALBUMIN/GLOB SERPL: 1.4 G/DL
ALP SERPL-CCNC: 74 U/L (ref 39–117)
ALT SERPL-CCNC: 16 U/L (ref 1–33)
AST SERPL-CCNC: 10 U/L (ref 1–32)
BILIRUB SERPL-MCNC: 0.3 MG/DL (ref 0.1–1.2)
BUN SERPL-MCNC: 13 MG/DL (ref 8–23)
BUN/CREAT SERPL: 19.4 (ref 7–25)
C PEPTIDE SERPL-MCNC: 16.8 NG/ML (ref 1.1–4.4)
CALCIUM SERPL-MCNC: 9.6 MG/DL (ref 8.6–10.5)
CHLORIDE SERPL-SCNC: 104 MMOL/L (ref 98–107)
CHOLEST SERPL-MCNC: 245 MG/DL (ref 0–200)
CO2 SERPL-SCNC: 27.4 MMOL/L (ref 22–29)
CREAT SERPL-MCNC: 0.67 MG/DL (ref 0.57–1)
FT4I SERPL CALC-MCNC: 2.7 (ref 1.2–4.9)
GLOBULIN SER CALC-MCNC: 3.1 GM/DL
GLUCOSE SERPL-MCNC: 148 MG/DL (ref 65–99)
HBA1C MFR BLD: 5.5 % (ref 4.8–5.6)
HDLC SERPL-MCNC: 37 MG/DL (ref 40–60)
INTERPRETATION: NORMAL
LDLC SERPL CALC-MCNC: 144 MG/DL (ref 0–100)
Lab: NORMAL
MICROALBUMIN UR-MCNC: 10 UG/ML
POTASSIUM SERPL-SCNC: 4.5 MMOL/L (ref 3.5–5.2)
PROT SERPL-MCNC: 7.5 G/DL (ref 6–8.5)
SHBG SERPL-SCNC: 38.1 NMOL/L (ref 17.3–125)
SODIUM SERPL-SCNC: 144 MMOL/L (ref 136–145)
T3FREE SERPL-MCNC: 3.2 PG/ML (ref 2–4.4)
T3RU NFR SERPL: 31 % (ref 24–39)
T4 FREE SERPL-MCNC: 1.75 NG/DL (ref 0.93–1.7)
T4 SERPL-MCNC: 8.8 UG/DL (ref 4.5–12)
TESTOST FREE SERPL-MCNC: 0.9 PG/ML (ref 0–4.2)
TESTOST SERPL-MCNC: <3 NG/DL (ref 3–41)
TRIGL SERPL-MCNC: 319 MG/DL (ref 0–150)
TSH SERPL DL<=0.005 MIU/L-ACNC: 0.1 UIU/ML (ref 0.45–4.5)
VLDLC SERPL CALC-MCNC: 63.8 MG/DL (ref 5–40)

## 2018-11-29 RX ORDER — ESTERIFIED ESTROGEN AND METHYLTESTOSTERONE .625; 1.25 MG/1; MG/1
1 TABLET ORAL DAILY
Qty: 30 TABLET | Refills: 0 | OUTPATIENT
Start: 2018-11-29 | End: 2019-01-07 | Stop reason: SDUPTHER

## 2018-11-29 NOTE — TELEPHONE ENCOUNTER
----- Message from Mila Jaquez sent at 11/28/2018  2:03 PM EST -----  New prescription estratest  .625/1.25 mg    Send to [facility]        Rx has been sent for approval

## 2019-01-07 RX ORDER — ESTERIFIED ESTROGEN AND METHYLTESTOSTERONE .625; 1.25 MG/1; MG/1
1 TABLET ORAL DAILY
Qty: 30 TABLET | Refills: 0 | Status: SHIPPED | OUTPATIENT
Start: 2019-01-07 | End: 2019-02-07 | Stop reason: SDUPTHER

## 2019-02-06 RX ORDER — GABAPENTIN 300 MG/1
CAPSULE ORAL
Qty: 270 CAPSULE | Refills: 0 | Status: CANCELLED | OUTPATIENT
Start: 2019-02-06

## 2019-02-07 RX ORDER — ESTERIFIED ESTROGEN AND METHYLTESTOSTERONE .625; 1.25 MG/1; MG/1
1 TABLET ORAL DAILY
Qty: 30 TABLET | Refills: 0 | Status: SHIPPED | OUTPATIENT
Start: 2019-02-07 | End: 2019-03-20 | Stop reason: SDUPTHER

## 2019-02-07 RX ORDER — GABAPENTIN 300 MG/1
300 CAPSULE ORAL 3 TIMES DAILY
Qty: 270 CAPSULE | Refills: 0 | Status: SHIPPED | OUTPATIENT
Start: 2019-02-07 | End: 2019-05-17 | Stop reason: SDUPTHER

## 2019-03-20 RX ORDER — ESTERIFIED ESTROGEN AND METHYLTESTOSTERONE .625; 1.25 MG/1; MG/1
1 TABLET ORAL DAILY
Qty: 30 TABLET | Refills: 0 | Status: SHIPPED | OUTPATIENT
Start: 2019-03-20 | End: 2019-04-18 | Stop reason: SDUPTHER

## 2019-04-24 DIAGNOSIS — E03.9 HYPOTHYROIDISM, UNSPECIFIED TYPE: ICD-10-CM

## 2019-04-24 DIAGNOSIS — E55.9 VITAMIN D DEFICIENCY: ICD-10-CM

## 2019-04-24 DIAGNOSIS — R53.82 CHRONIC FATIGUE: ICD-10-CM

## 2019-04-24 DIAGNOSIS — E79.0 HYPERURICEMIA: ICD-10-CM

## 2019-04-24 DIAGNOSIS — E11.69 TYPE 2 DIABETES MELLITUS WITH OTHER SPECIFIED COMPLICATION, UNSPECIFIED WHETHER LONG TERM INSULIN USE (HCC): ICD-10-CM

## 2019-04-24 DIAGNOSIS — E78.49 OTHER HYPERLIPIDEMIA: Primary | ICD-10-CM

## 2019-04-24 DIAGNOSIS — E78.49 OTHER HYPERLIPIDEMIA: ICD-10-CM

## 2019-04-27 LAB
25(OH)D3+25(OH)D2 SERPL-MCNC: 39 NG/ML (ref 30–100)
ALBUMIN SERPL-MCNC: 4.7 G/DL (ref 3.5–5.2)
ALBUMIN/GLOB SERPL: 1.8 G/DL
ALP SERPL-CCNC: 69 U/L (ref 39–117)
ALT SERPL-CCNC: 23 U/L (ref 1–33)
AST SERPL-CCNC: 13 U/L (ref 1–32)
BILIRUB SERPL-MCNC: 0.2 MG/DL (ref 0.2–1.2)
BUN SERPL-MCNC: 12 MG/DL (ref 8–23)
BUN/CREAT SERPL: 17.6 (ref 7–25)
C PEPTIDE SERPL-MCNC: 4.1 NG/ML (ref 1.1–4.4)
CALCIUM SERPL-MCNC: 9.5 MG/DL (ref 8.6–10.5)
CHLORIDE SERPL-SCNC: 105 MMOL/L (ref 98–107)
CHOLEST SERPL-MCNC: 224 MG/DL (ref 0–200)
CO2 SERPL-SCNC: 24.5 MMOL/L (ref 22–29)
CREAT SERPL-MCNC: 0.68 MG/DL (ref 0.57–1)
GLOBULIN SER CALC-MCNC: 2.6 GM/DL
GLUCOSE SERPL-MCNC: 99 MG/DL (ref 65–99)
HBA1C MFR BLD: 5.9 % (ref 4.8–5.6)
HDLC SERPL-MCNC: 38 MG/DL (ref 40–60)
INTERPRETATION: NORMAL
LDLC SERPL CALC-MCNC: 147 MG/DL (ref 0–100)
Lab: NORMAL
MICROALBUMIN UR-MCNC: 7.5 UG/ML
POTASSIUM SERPL-SCNC: 4.8 MMOL/L (ref 3.5–5.2)
PROT SERPL-MCNC: 7.3 G/DL (ref 6–8.5)
SODIUM SERPL-SCNC: 143 MMOL/L (ref 136–145)
T3FREE SERPL-MCNC: 3.4 PG/ML (ref 2–4.4)
T4 FREE SERPL-MCNC: 1.6 NG/DL (ref 0.93–1.7)
T4 SERPL-MCNC: 8.37 MCG/DL (ref 4.5–11.7)
THYROGLOB AB SERPL-ACNC: <1 IU/ML
THYROGLOB SERPL-MCNC: 4.9 NG/ML
THYROGLOB SERPL-MCNC: NORMAL NG/ML
TRIGL SERPL-MCNC: 196 MG/DL (ref 0–150)
TSH SERPL DL<=0.005 MIU/L-ACNC: 0.27 MIU/ML (ref 0.27–4.2)
URATE SERPL-MCNC: 5.2 MG/DL (ref 2.4–5.7)
VLDLC SERPL CALC-MCNC: 39.2 MG/DL

## 2019-05-06 RX ORDER — ERGOCALCIFEROL 1.25 MG/1
CAPSULE ORAL
Qty: 13 CAPSULE | Refills: 0 | Status: SHIPPED | OUTPATIENT
Start: 2019-05-06 | End: 2019-05-08

## 2019-05-08 ENCOUNTER — OFFICE VISIT (OUTPATIENT)
Dept: ENDOCRINOLOGY | Age: 69
End: 2019-05-08

## 2019-05-08 VITALS
SYSTOLIC BLOOD PRESSURE: 128 MMHG | WEIGHT: 201.2 LBS | HEIGHT: 65 IN | RESPIRATION RATE: 16 BRPM | BODY MASS INDEX: 33.52 KG/M2 | DIASTOLIC BLOOD PRESSURE: 80 MMHG

## 2019-05-08 DIAGNOSIS — E11.8 CONTROLLED TYPE 2 DIABETES MELLITUS WITH COMPLICATION, WITHOUT LONG-TERM CURRENT USE OF INSULIN (HCC): Primary | ICD-10-CM

## 2019-05-08 DIAGNOSIS — E06.3 HYPOTHYROIDISM DUE TO HASHIMOTO'S THYROIDITIS: ICD-10-CM

## 2019-05-08 DIAGNOSIS — R80.9 PROTEINURIA, UNSPECIFIED TYPE: ICD-10-CM

## 2019-05-08 DIAGNOSIS — R53.82 CHRONIC FATIGUE: ICD-10-CM

## 2019-05-08 DIAGNOSIS — E79.0 HYPERURICEMIA: ICD-10-CM

## 2019-05-08 DIAGNOSIS — Z78.0 MENOPAUSE PRESENT: ICD-10-CM

## 2019-05-08 DIAGNOSIS — E03.8 HYPOTHYROIDISM DUE TO HASHIMOTO'S THYROIDITIS: ICD-10-CM

## 2019-05-08 DIAGNOSIS — E55.9 VITAMIN D DEFICIENCY: ICD-10-CM

## 2019-05-08 DIAGNOSIS — E78.2 MIXED HYPERLIPIDEMIA: ICD-10-CM

## 2019-05-08 DIAGNOSIS — N95.2 VAGINAL ATROPHY: ICD-10-CM

## 2019-05-08 DIAGNOSIS — I10 ESSENTIAL HYPERTENSION: ICD-10-CM

## 2019-05-08 PROCEDURE — 99214 OFFICE O/P EST MOD 30 MIN: CPT | Performed by: INTERNAL MEDICINE

## 2019-05-08 RX ORDER — ESTERIFIED ESTROGEN AND METHYLTESTOSTERONE .625; 1.25 MG/1; MG/1
1 TABLET ORAL DAILY
Qty: 30 TABLET | Refills: 5 | Status: SHIPPED | OUTPATIENT
Start: 2019-05-08 | End: 2019-11-28 | Stop reason: SDUPTHER

## 2019-05-08 RX ORDER — ALLOPURINOL 100 MG/1
100 TABLET ORAL DAILY
Qty: 90 TABLET | Refills: 3 | Status: SHIPPED | OUTPATIENT
Start: 2019-05-08 | End: 2020-04-28

## 2019-05-08 RX ORDER — ERGOCALCIFEROL 1.25 MG/1
50000 CAPSULE ORAL 2 TIMES WEEKLY
Qty: 26 CAPSULE | Refills: 3 | Status: SHIPPED | OUTPATIENT
Start: 2019-05-09 | End: 2020-05-08

## 2019-05-08 RX ORDER — LEVOTHYROXINE SODIUM 125 MCG
125 TABLET ORAL DAILY
Qty: 90 TABLET | Refills: 1 | Status: SHIPPED | OUTPATIENT
Start: 2019-05-08 | End: 2019-11-03 | Stop reason: SDUPTHER

## 2019-05-08 RX ORDER — EMPAGLIFLOZIN, METFORMIN HYDROCHLORIDE 25; 1000 MG/1; MG/1
1 TABLET, EXTENDED RELEASE ORAL DAILY
Qty: 30 TABLET | Refills: 11 | Status: SHIPPED | OUTPATIENT
Start: 2019-05-08 | End: 2020-01-08

## 2019-05-08 NOTE — PROGRESS NOTES
"Subjective   Jose Keys is a 69 y.o. female seen for follow up for DM2, hyperlipidemia, HTN, hypothyroidism, vit d deficiency, lab review. Patient is checking BG as needed. She states that she is feeling tired.     History of Present Illness this is a 69-year-old female known patient with type 2 diabetes hypertension and dyslipidemia as well as hypothyroidism and vitamin D deficiency and menopausal symptoms.  Over the course of last 6 months she has had no significant health problem for which to go to the ER or hospital however she is complaining of feeling tired and fatigued.    /80   Resp 16   Ht 165.1 cm (65\")   Wt 91.3 kg (201 lb 3.2 oz)   BMI 33.48 kg/m²      Allergies   Allergen Reactions   • Atorvastatin    • Azithromycin    • Erythromycin    • Ezetimibe-Simvastatin    • Hydrocodone-Acetaminophen    • Praluent [Alirocumab]    • Ropinirole Hcl    • Simvastatin    • Statins    • Sulfa Antibiotics    • Sulfur    • Zetia [Ezetimibe]        Current Outpatient Medications:   •  ACCU-CHEK FASTCLIX LANCETS misc, Use to test BG 2x daily. DX CODE: E11.65, Disp: 200 each, Rfl: 1  •  allopurinol (ZYLOPRIM) 100 MG tablet, Take 1 tablet by mouth Daily., Disp: 90 tablet, Rfl: 1  •  Alpha-Lipoic Acid 600 MG capsule, Take  by mouth., Disp: , Rfl:   •  amLODIPine-valsartan (EXFORGE)  MG per tablet, Take 1 tablet by mouth Daily., Disp: 90 tablet, Rfl: 1  •  ASPIRIN LOW DOSE 81 MG EC tablet, Take 81 mg by mouth Daily., Disp: , Rfl: 11  •  carvedilol (COREG) 3.125 MG tablet, Take 1 tablet by mouth 2 (Two) Times a Day With Meals., Disp: 180 tablet, Rfl: 1  •  cetirizine (zyrTEC) 5 MG tablet, Take 5 mg by mouth., Disp: , Rfl:   •  escitalopram (LEXAPRO) 20 MG tablet, Take 20 mg by mouth Daily., Disp: , Rfl:   •  EST ESTROGENS-METHYLTEST HS 0.625-1.25 MG per tablet, TAKE 1 TABLET BY MOUTH DAILY, Disp: 30 tablet, Rfl: 0  •  gabapentin (NEURONTIN) 300 MG capsule, Take 1 capsule by mouth 3 (Three) Times a Day., Disp: " 270 capsule, Rfl: 0  •  glucose blood (ACCU-CHEK SMARTVIEW) test strip, Use to test BG 2x daily, Disp: 200 each, Rfl: 1  •  Lancet Devices (ACCU-CHEK SOFTCLIX) lancets, Use as instructed, Disp: 1 each, Rfl: 0  •  metFORMIN (GLUCOPHAGE) 1000 MG tablet, Take 1 tablet by mouth Daily With Breakfast., Disp: 180 tablet, Rfl: 1  •  Multiple Vitamins-Minerals (MULTIVITAMIN PO), Take 1 tablet by mouth daily., Disp: , Rfl:   •  Multiple Vitamins-Minerals (OCUVITE PO), Take 1 tablet by mouth daily., Disp: , Rfl:   •  SYNTHROID 125 MCG tablet, Take 1 tablet by mouth Daily., Disp: 90 tablet, Rfl: 1  •  vitamin D (ERGOCALCIFEROL) 91589 units capsule capsule, TAKE 1 CAPSULE BY MOUTH EVERY 7 DAYS, Disp: 13 capsule, Rfl: 0      The following portions of the patient's history were reviewed and updated as appropriate: allergies, current medications, past family history, past medical history, past social history, past surgical history and problem list.    Review of Systems   Constitutional: Positive for fatigue.   HENT: Negative.    Eyes: Negative.    Respiratory: Negative.    Cardiovascular: Negative.    Gastrointestinal: Negative.    Endocrine: Negative.    Genitourinary: Negative.    Musculoskeletal: Negative.    Skin: Negative.    Allergic/Immunologic: Negative.    Neurological: Negative.    Hematological: Negative.    Psychiatric/Behavioral: Negative.        Objective   Physical Exam   Constitutional: She is oriented to person, place, and time. She appears well-developed and well-nourished. No distress.   HENT:   Head: Normocephalic and atraumatic.   Right Ear: External ear normal.   Left Ear: External ear normal.   Nose: Nose normal.   Mouth/Throat: Oropharynx is clear and moist. No oropharyngeal exudate.   Eyes: Conjunctivae and EOM are normal. Pupils are equal, round, and reactive to light. Right eye exhibits no discharge. Left eye exhibits no discharge. No scleral icterus.   Neck: Normal range of motion. Neck supple. No JVD  present. No tracheal deviation present. No thyromegaly present.   Cardiovascular: Normal rate, regular rhythm, normal heart sounds and intact distal pulses. Exam reveals no gallop and no friction rub.   No murmur heard.  Pulmonary/Chest: Effort normal and breath sounds normal. No stridor. No respiratory distress. She has no wheezes. She has no rales. She exhibits no tenderness.   Abdominal: Soft. Bowel sounds are normal. She exhibits no distension and no mass. There is no tenderness. There is no rebound and no guarding. No hernia.   Musculoskeletal: Normal range of motion. She exhibits no edema, tenderness or deformity.   Lymphadenopathy:     She has no cervical adenopathy.   Neurological: She is alert and oriented to person, place, and time. She has normal reflexes. She displays normal reflexes. No cranial nerve deficit or sensory deficit. She exhibits normal muscle tone. Coordination normal.   Skin: Skin is warm and dry. No rash noted. She is not diaphoretic. No erythema. No pallor.   Psychiatric: She has a normal mood and affect. Her behavior is normal. Judgment and thought content normal.   Nursing note and vitals reviewed.       Results for orders placed or performed in visit on 04/24/19   Comprehensive Thyroglobulin   Result Value Ref Range    Thyroglobulin Ab <1.0 IU/mL    Thyroglobulin 4.9 ng/mL    Thyroglobulin (TG-HARSH) Comment ng/mL   T3, Free   Result Value Ref Range    T3, Free 3.4 2.0 - 4.4 pg/mL   T4 & TSH ([facility])   Result Value Ref Range    TSH 0.271 0.270 - 4.200 mIU/mL    T4, Total 8.37 4.50 - 11.70 mcg/dL   T4, Free   Result Value Ref Range    Free T4 1.60 0.93 - 1.70 ng/dL   Vitamin D 25 Hydroxy   Result Value Ref Range    25 Hydroxy, Vitamin D 39.0 30.0 - 100.0 ng/ml   Uric Acid   Result Value Ref Range    Uric Acid 5.2 2.4 - 5.7 mg/dL   MicroAlbumin, Urine, Random - Urine, Clean Catch   Result Value Ref Range    Microalbumin, Urine 7.5 Not Estab. ug/mL   Lipid Panel   Result Value Ref Range     Total Cholesterol 224 (H) 0 - 200 mg/dL    Triglycerides 196 (H) 0 - 150 mg/dL    HDL Cholesterol 38 (L) 40 - 60 mg/dL    VLDL Cholesterol 39.2 mg/dL    LDL Cholesterol  147 (H) 0 - 100 mg/dL   Hemoglobin A1c   Result Value Ref Range    Hemoglobin A1C 5.90 (H) 4.80 - 5.60 %   C-Peptide   Result Value Ref Range    C-Peptide 4.1 1.1 - 4.4 ng/mL   Comprehensive Metabolic Panel   Result Value Ref Range    Glucose 99 65 - 99 mg/dL    BUN 12 8 - 23 mg/dL    Creatinine 0.68 0.57 - 1.00 mg/dL    eGFR Non African Am 86 >60 mL/min/1.73    eGFR African Am 105 >60 mL/min/1.73    BUN/Creatinine Ratio 17.6 7.0 - 25.0    Sodium 143 136 - 145 mmol/L    Potassium 4.8 3.5 - 5.2 mmol/L    Chloride 105 98 - 107 mmol/L    Total CO2 24.5 22.0 - 29.0 mmol/L    Calcium 9.5 8.6 - 10.5 mg/dL    Total Protein 7.3 6.0 - 8.5 g/dL    Albumin 4.70 3.50 - 5.20 g/dL    Globulin 2.6 gm/dL    A/G Ratio 1.8 g/dL    Total Bilirubin 0.2 0.2 - 1.2 mg/dL    Alkaline Phosphatase 69 39 - 117 U/L    AST (SGOT) 13 1 - 32 U/L    ALT (SGPT) 23 1 - 33 U/L   Cardiovascular Risk Assessment   Result Value Ref Range    Interpretation Note    Diabetes Patient Education   Result Value Ref Range    PDF Image Not applicable          Assessment/Plan   Diagnoses and all orders for this visit:    Controlled type 2 diabetes mellitus with complication, without long-term current use of insulin (CMS/[facility])  -     T3, Free; Future  -     T4, Free; Future  -     T4 & TSH ([facility]); Future  -     Uric Acid; Future  -     Vitamin D 25 Hydroxy; Future  -     Thyroglobulin With Anti-TG; Future  -     Comprehensive Metabolic Panel; Future  -     C-Peptide; Future  -     Hemoglobin A1c; Future  -     Lipid Panel; Future  -     MicroAlbumin, Urine, Random - Urine, Clean Catch; Future    Mixed hyperlipidemia  -     T3, Free; Future  -     T4, Free; Future  -     T4 & TSH ([facility]); Future  -     Uric Acid; Future  -     Vitamin D 25 Hydroxy; Future  -     Thyroglobulin With Anti-TG;  Future  -     Comprehensive Metabolic Panel; Future  -     C-Peptide; Future  -     Hemoglobin A1c; Future  -     Lipid Panel; Future  -     MicroAlbumin, Urine, Random - Urine, Clean Catch; Future    Essential hypertension  -     T3, Free; Future  -     T4, Free; Future  -     T4 & TSH ([facility]); Future  -     Uric Acid; Future  -     Vitamin D 25 Hydroxy; Future  -     Thyroglobulin With Anti-TG; Future  -     Comprehensive Metabolic Panel; Future  -     C-Peptide; Future  -     Hemoglobin A1c; Future  -     Lipid Panel; Future  -     MicroAlbumin, Urine, Random - Urine, Clean Catch; Future    Vitamin D deficiency  -     T3, Free; Future  -     T4, Free; Future  -     T4 & TSH ([facility]); Future  -     Uric Acid; Future  -     Vitamin D 25 Hydroxy; Future  -     Thyroglobulin With Anti-TG; Future  -     Comprehensive Metabolic Panel; Future  -     C-Peptide; Future  -     Hemoglobin A1c; Future  -     Lipid Panel; Future  -     MicroAlbumin, Urine, Random - Urine, Clean Catch; Future    Hypothyroidism due to Hashimoto's thyroiditis  -     T3, Free; Future  -     T4, Free; Future  -     T4 & TSH ([facility]); Future  -     Uric Acid; Future  -     Vitamin D 25 Hydroxy; Future  -     Thyroglobulin With Anti-TG; Future  -     Comprehensive Metabolic Panel; Future  -     C-Peptide; Future  -     Hemoglobin A1c; Future  -     Lipid Panel; Future  -     MicroAlbumin, Urine, Random - Urine, Clean Catch; Future    Menopause present  -     T3, Free; Future  -     T4, Free; Future  -     T4 & TSH ([facility]); Future  -     Uric Acid; Future  -     Vitamin D 25 Hydroxy; Future  -     Thyroglobulin With Anti-TG; Future  -     Comprehensive Metabolic Panel; Future  -     C-Peptide; Future  -     Hemoglobin A1c; Future  -     Lipid Panel; Future  -     MicroAlbumin, Urine, Random - Urine, Clean Catch; Future    Proteinuria, unspecified type  -     T3, Free; Future  -     T4, Free; Future  -     T4 & TSH ([facility]); Future  -     Uric  Acid; Future  -     Vitamin D 25 Hydroxy; Future  -     Thyroglobulin With Anti-TG; Future  -     Comprehensive Metabolic Panel; Future  -     C-Peptide; Future  -     Hemoglobin A1c; Future  -     Lipid Panel; Future  -     MicroAlbumin, Urine, Random - Urine, Clean Catch; Future    Vaginal atrophy  -     T3, Free; Future  -     T4, Free; Future  -     T4 & TSH ([facility]); Future  -     Uric Acid; Future  -     Vitamin D 25 Hydroxy; Future  -     Thyroglobulin With Anti-TG; Future  -     Comprehensive Metabolic Panel; Future  -     C-Peptide; Future  -     Hemoglobin A1c; Future  -     Lipid Panel; Future  -     MicroAlbumin, Urine, Random - Urine, Clean Catch; Future    Hyperuricemia  -     T3, Free; Future  -     T4, Free; Future  -     T4 & TSH ([facility]); Future  -     Uric Acid; Future  -     Vitamin D 25 Hydroxy; Future  -     Thyroglobulin With Anti-TG; Future  -     Comprehensive Metabolic Panel; Future  -     C-Peptide; Future  -     Hemoglobin A1c; Future  -     Lipid Panel; Future  -     MicroAlbumin, Urine, Random - Urine, Clean Catch; Future    Chronic fatigue  -     T3, Free; Future  -     T4, Free; Future  -     T4 & TSH ([facility]); Future  -     Uric Acid; Future  -     Vitamin D 25 Hydroxy; Future  -     Thyroglobulin With Anti-TG; Future  -     Comprehensive Metabolic Panel; Future  -     C-Peptide; Future  -     Hemoglobin A1c; Future  -     Lipid Panel; Future  -     MicroAlbumin, Urine, Random - Urine, Clean Catch; Future    Other orders  -     SYNTHROID 125 MCG tablet; Take 1 tablet by mouth Daily.  -     ergocalciferol (DRISDOL) 39452 units capsule; Take 1 capsule by mouth 2 (Two) Times a Week.  -     Discontinue: metFORMIN (GLUCOPHAGE) 1000 MG tablet; Take 1 tablet by mouth Daily With Breakfast.  -     estrogens, conjugated,-methyltestosterone (EST ESTROGENS-METHYLTEST HS) 0.625-1.25 MG per tablet; Take 1 tablet by mouth Daily.  -     allopurinol (ZYLOPRIM) 100 MG tablet; Take 1 tablet by mouth  Daily.  -     SYNJARDY XR  MG tablet sustained-release 24 hour; Take 1 tablet by mouth Daily.      In summary I saw and examined this 69-year-old female for above-mentioned problems.  I reviewed her laboratory evaluation of April 24, 2019 and provided her with a hard copy of it.  Aside from the fact that she has elevated levels of total cholesterol, LDL as well as triglycerides she is clinically and metabolically stable and therefore we will go ahead and continue all her current prescriptions.  Because she is having diarrhea even with the lowering of her dose of metformin I am going to go ahead and stop the metformin and will start her on Synjardy 10/1000 mg every morning for 3 weeks and if well tolerated will increase the dose to 25/1000 mg every morning.  I cautioned her to drink plenty of fluid and cleans her genital area of very thoroughly after the use of toilet or sexual intercourse.  She will see Ms. Analisa Shaikh in 6 months or sooner if needed with laboratory evaluation prior to each office visit.

## 2019-05-16 RX ORDER — GABAPENTIN 300 MG/1
CAPSULE ORAL
Qty: 270 CAPSULE | Refills: 0 | Status: CANCELLED | OUTPATIENT
Start: 2019-05-16

## 2019-05-17 RX ORDER — GABAPENTIN 300 MG/1
300 CAPSULE ORAL 3 TIMES DAILY
Qty: 270 CAPSULE | Refills: 0 | Status: SHIPPED | OUTPATIENT
Start: 2019-05-17 | End: 2019-08-05 | Stop reason: SDUPTHER

## 2019-08-05 RX ORDER — GABAPENTIN 300 MG/1
CAPSULE ORAL
Qty: 270 CAPSULE | Refills: 0 | Status: SHIPPED | OUTPATIENT
Start: 2019-08-05 | End: 2019-11-03 | Stop reason: SDUPTHER

## 2019-08-05 RX ORDER — ERGOCALCIFEROL 1.25 MG/1
CAPSULE ORAL
Qty: 13 CAPSULE | Refills: 0 | Status: SHIPPED | OUTPATIENT
Start: 2019-08-05 | End: 2020-08-04 | Stop reason: SDUPTHER

## 2019-10-11 DIAGNOSIS — E06.3 HYPOTHYROIDISM DUE TO HASHIMOTO'S THYROIDITIS: ICD-10-CM

## 2019-10-11 DIAGNOSIS — E03.8 HYPOTHYROIDISM DUE TO HASHIMOTO'S THYROIDITIS: ICD-10-CM

## 2019-10-11 DIAGNOSIS — E79.0 HYPERURICEMIA: ICD-10-CM

## 2019-10-11 DIAGNOSIS — E11.8 CONTROLLED TYPE 2 DIABETES MELLITUS WITH COMPLICATION, WITHOUT LONG-TERM CURRENT USE OF INSULIN (HCC): Primary | ICD-10-CM

## 2019-10-11 DIAGNOSIS — E55.9 VITAMIN D DEFICIENCY: ICD-10-CM

## 2019-10-11 DIAGNOSIS — E03.9 HYPOTHYROIDISM, UNSPECIFIED TYPE: ICD-10-CM

## 2019-10-11 DIAGNOSIS — E78.2 MIXED HYPERLIPIDEMIA: ICD-10-CM

## 2019-10-11 PROBLEM — R07.9 CHEST PAIN: Status: RESOLVED | Noted: 2017-04-13 | Resolved: 2019-10-11

## 2019-10-11 PROBLEM — R30.0 BURNING WITH URINATION: Status: RESOLVED | Noted: 2017-07-19 | Resolved: 2019-10-11

## 2019-10-11 PROBLEM — R00.2 PALPITATIONS: Status: RESOLVED | Noted: 2017-04-14 | Resolved: 2019-10-11

## 2019-10-24 ENCOUNTER — RESULTS ENCOUNTER (OUTPATIENT)
Dept: ENDOCRINOLOGY | Age: 69
End: 2019-10-24

## 2019-10-24 DIAGNOSIS — E78.2 MIXED HYPERLIPIDEMIA: ICD-10-CM

## 2019-10-24 DIAGNOSIS — E55.9 VITAMIN D DEFICIENCY: ICD-10-CM

## 2019-10-24 DIAGNOSIS — E79.0 HYPERURICEMIA: ICD-10-CM

## 2019-10-24 DIAGNOSIS — E03.8 HYPOTHYROIDISM DUE TO HASHIMOTO'S THYROIDITIS: ICD-10-CM

## 2019-10-24 DIAGNOSIS — Z78.0 MENOPAUSE PRESENT: ICD-10-CM

## 2019-10-24 DIAGNOSIS — E11.8 CONTROLLED TYPE 2 DIABETES MELLITUS WITH COMPLICATION, WITHOUT LONG-TERM CURRENT USE OF INSULIN (HCC): ICD-10-CM

## 2019-10-24 DIAGNOSIS — E06.3 HYPOTHYROIDISM DUE TO HASHIMOTO'S THYROIDITIS: ICD-10-CM

## 2019-10-24 DIAGNOSIS — R53.82 CHRONIC FATIGUE: ICD-10-CM

## 2019-10-24 DIAGNOSIS — R80.9 PROTEINURIA, UNSPECIFIED TYPE: ICD-10-CM

## 2019-10-24 DIAGNOSIS — N95.2 VAGINAL ATROPHY: ICD-10-CM

## 2019-10-24 DIAGNOSIS — I10 ESSENTIAL HYPERTENSION: ICD-10-CM

## 2019-10-25 ENCOUNTER — LAB (OUTPATIENT)
Dept: ENDOCRINOLOGY | Age: 69
End: 2019-10-25

## 2019-10-25 DIAGNOSIS — E03.8 HYPOTHYROIDISM DUE TO HASHIMOTO'S THYROIDITIS: ICD-10-CM

## 2019-10-25 DIAGNOSIS — E78.2 MIXED HYPERLIPIDEMIA: ICD-10-CM

## 2019-10-25 DIAGNOSIS — E03.9 HYPOTHYROIDISM, UNSPECIFIED TYPE: ICD-10-CM

## 2019-10-25 DIAGNOSIS — E06.3 HYPOTHYROIDISM DUE TO HASHIMOTO'S THYROIDITIS: ICD-10-CM

## 2019-10-25 DIAGNOSIS — E55.9 VITAMIN D DEFICIENCY: ICD-10-CM

## 2019-10-25 DIAGNOSIS — E79.0 HYPERURICEMIA: ICD-10-CM

## 2019-10-25 DIAGNOSIS — E11.8 CONTROLLED TYPE 2 DIABETES MELLITUS WITH COMPLICATION, WITHOUT LONG-TERM CURRENT USE OF INSULIN (HCC): ICD-10-CM

## 2019-10-30 LAB
25(OH)D3+25(OH)D2 SERPL-MCNC: 63.7 NG/ML (ref 30–100)
ALBUMIN SERPL-MCNC: 4.7 G/DL (ref 3.5–5.2)
ALBUMIN/GLOB SERPL: 1.8 G/DL
ALP SERPL-CCNC: 73 U/L (ref 39–117)
ALT SERPL-CCNC: 22 U/L (ref 1–33)
AST SERPL-CCNC: 15 U/L (ref 1–32)
BILIRUB SERPL-MCNC: 0.2 MG/DL (ref 0.2–1.2)
BUN SERPL-MCNC: 10 MG/DL (ref 8–23)
BUN/CREAT SERPL: 13.5 (ref 7–25)
C PEPTIDE SERPL-MCNC: 4.4 NG/ML (ref 1.1–4.4)
CALCIUM SERPL-MCNC: 9.4 MG/DL (ref 8.6–10.5)
CHLORIDE SERPL-SCNC: 104 MMOL/L (ref 98–107)
CHOLEST SERPL-MCNC: 231 MG/DL (ref 0–200)
CO2 SERPL-SCNC: 27.8 MMOL/L (ref 22–29)
CREAT SERPL-MCNC: 0.74 MG/DL (ref 0.57–1)
FT4I SERPL CALC-MCNC: 2.9 (ref 1.2–4.9)
GLOBULIN SER CALC-MCNC: 2.6 GM/DL
GLUCOSE SERPL-MCNC: 95 MG/DL (ref 65–99)
HBA1C MFR BLD: 6.1 % (ref 4.8–5.6)
HDLC SERPL-MCNC: 40 MG/DL (ref 40–60)
INTERPRETATION: NORMAL
LDLC SERPL CALC-MCNC: 152 MG/DL (ref 0–100)
Lab: NORMAL
MICROALBUMIN UR-MCNC: 9.6 UG/ML
POTASSIUM SERPL-SCNC: 5 MMOL/L (ref 3.5–5.2)
PROT SERPL-MCNC: 7.3 G/DL (ref 6–8.5)
SODIUM SERPL-SCNC: 142 MMOL/L (ref 136–145)
T3FREE SERPL-MCNC: 3.2 PG/ML (ref 2–4.4)
T3RU NFR SERPL: 32 % (ref 24–39)
T4 FREE SERPL-MCNC: 1.52 NG/DL (ref 0.93–1.7)
T4 SERPL-MCNC: 9.1 UG/DL (ref 4.5–12)
THYROGLOB AB SERPL-ACNC: <1 IU/ML
THYROGLOB SERPL-MCNC: 5.1 NG/ML
THYROGLOB SERPL-MCNC: NORMAL NG/ML
TRIGL SERPL-MCNC: 194 MG/DL (ref 0–150)
TSH SERPL DL<=0.005 MIU/L-ACNC: 0.3 UIU/ML (ref 0.45–4.5)
URATE SERPL-MCNC: 4.3 MG/DL (ref 2.4–5.7)
VLDLC SERPL CALC-MCNC: 38.8 MG/DL

## 2019-11-04 RX ORDER — LEVOTHYROXINE SODIUM 125 MCG
125 TABLET ORAL DAILY
Qty: 90 TABLET | Refills: 0 | Status: SHIPPED | OUTPATIENT
Start: 2019-11-04 | End: 2020-01-29

## 2019-11-04 RX ORDER — GABAPENTIN 300 MG/1
CAPSULE ORAL
Qty: 270 CAPSULE | Refills: 0 | Status: SHIPPED | OUTPATIENT
Start: 2019-11-04 | End: 2019-11-08 | Stop reason: SDUPTHER

## 2019-11-08 ENCOUNTER — OFFICE VISIT (OUTPATIENT)
Dept: ENDOCRINOLOGY | Age: 69
End: 2019-11-08

## 2019-11-08 VITALS
BODY MASS INDEX: 32.22 KG/M2 | WEIGHT: 193.4 LBS | SYSTOLIC BLOOD PRESSURE: 118 MMHG | HEIGHT: 65 IN | DIASTOLIC BLOOD PRESSURE: 62 MMHG

## 2019-11-08 DIAGNOSIS — E11.8 CONTROLLED TYPE 2 DIABETES MELLITUS WITH COMPLICATION, WITHOUT LONG-TERM CURRENT USE OF INSULIN (HCC): Primary | ICD-10-CM

## 2019-11-08 DIAGNOSIS — G62.9 NEUROPATHY: ICD-10-CM

## 2019-11-08 DIAGNOSIS — E03.8 HYPOTHYROIDISM DUE TO HASHIMOTO'S THYROIDITIS: ICD-10-CM

## 2019-11-08 DIAGNOSIS — E55.9 VITAMIN D DEFICIENCY: ICD-10-CM

## 2019-11-08 DIAGNOSIS — E78.2 MIXED HYPERLIPIDEMIA: ICD-10-CM

## 2019-11-08 DIAGNOSIS — E06.3 HYPOTHYROIDISM DUE TO HASHIMOTO'S THYROIDITIS: ICD-10-CM

## 2019-11-08 PROCEDURE — 99214 OFFICE O/P EST MOD 30 MIN: CPT | Performed by: NURSE PRACTITIONER

## 2019-11-08 RX ORDER — ESTERIFIED ESTROGEN AND METHYLTESTOSTERONE .625; 1.25 MG/1; MG/1
TABLET ORAL
COMMUNITY
End: 2020-01-02

## 2019-11-08 RX ORDER — ESCITALOPRAM OXALATE 10 MG/1
TABLET ORAL DAILY
Refills: 2 | COMMUNITY
Start: 2019-09-04

## 2019-11-08 RX ORDER — MONTELUKAST SODIUM 10 MG/1
10 TABLET ORAL DAILY
Refills: 0 | COMMUNITY
Start: 2019-11-05

## 2019-11-08 RX ORDER — GABAPENTIN 300 MG/1
300 CAPSULE ORAL 4 TIMES DAILY
Qty: 360 CAPSULE | Refills: 0 | Status: SHIPPED | OUTPATIENT
Start: 2019-11-08 | End: 2020-07-10

## 2019-11-08 RX ORDER — BUPROPION HYDROCHLORIDE 300 MG/1
TABLET ORAL DAILY
Refills: 2 | COMMUNITY
Start: 2019-11-03

## 2019-11-08 NOTE — PATIENT INSTRUCTIONS
Continue all current meds the same   Get copy of insurance formulary  Increase gabapentin for neuropathy  Podiatry referral

## 2019-11-08 NOTE — PROGRESS NOTES
"Subjective   Jose Keys is a 69 y.o. female is here today for follow-up.  Chief Complaint   Patient presents with   • Diabetes     follow up , dm 2 lab review not checking bg   • Hyperlipidemia   • Hypertension   • Vitamin D Deficiency     /62   Ht 165.1 cm (65\")   Wt 87.7 kg (193 lb 6.4 oz)   BMI 32.18 kg/m²   Current Outpatient Medications on File Prior to Visit   Medication Sig   • ACCU-CHEK FASTCLIX LANCETS misc Use to test BG 2x daily. DX CODE: E11.65   • allopurinol (ZYLOPRIM) 100 MG tablet Take 1 tablet by mouth Daily.   • Alpha-Lipoic Acid 600 MG capsule Take  by mouth.   • amLODIPine-valsartan (EXFORGE)  MG per tablet Take 1 tablet by mouth Daily.   • ASPIRIN LOW DOSE 81 MG EC tablet Take 81 mg by mouth Daily.   • buPROPion XL (WELLBUTRIN XL) 300 MG 24 hr tablet Take  by mouth Daily.   • carvedilol (COREG) 3.125 MG tablet Take 1 tablet by mouth 2 (Two) Times a Day With Meals.   • cetirizine (zyrTEC) 5 MG tablet Take 5 mg by mouth.   • ergocalciferol (DRISDOL) 43846 units capsule Take 1 capsule by mouth 2 (Two) Times a Week.   • escitalopram (LEXAPRO) 10 MG tablet Take  by mouth Daily.   • estrogens, conjugated,-methyltestosterone (EST ESTROGENS-METHYLTEST HS) 0.625-1.25 MG per tablet Take 1 tablet by mouth Daily.   • estrogens, conjugated,-methyltestosterone (ESTRATEST HS) 0.625-1.25 MG per tablet Take  by mouth.   • glucose blood (ACCU-CHEK SMARTVIEW) test strip Use to test BG 2x daily   • Lancet Devices (ACCU-CHEK SOFTCLIX) lancets Use as instructed   • montelukast (SINGULAIR) 10 MG tablet Take 10 mg by mouth Daily.   • Multiple Vitamins-Minerals (MULTIVITAMIN PO) Take 1 tablet by mouth daily.   • Multiple Vitamins-Minerals (OCUVITE PO) Take 1 tablet by mouth daily.   • SYNJARDY XR  MG tablet sustained-release 24 hour Take 1 tablet by mouth Daily.   • SYNTHROID 125 MCG tablet TAKE 1 TABLET BY MOUTH DAILY   • vitamin D (ERGOCALCIFEROL) 42365 units capsule capsule TAKE 1 CAPSULE BY " MOUTH EVERY 7 DAYS   • [DISCONTINUED] gabapentin (NEURONTIN) 300 MG capsule TAKE 1 CAPSULE BY MOUTH THREE TIMES DAILY   • [DISCONTINUED] escitalopram (LEXAPRO) 20 MG tablet Take 20 mg by mouth Daily.     No current facility-administered medications on file prior to visit.      Family History   Problem Relation Age of Onset   • Diabetes Father    • Hypertension Father    • Diabetes Sister      Social History     Tobacco Use   • Smoking status: Never Smoker   • Smokeless tobacco: Never Used   Substance Use Topics   • Alcohol use: Yes     Comment: socially    • Drug use: No     Allergies   Allergen Reactions   • Atorvastatin    • Azithromycin    • Erythromycin    • Ezetimibe-Simvastatin    • Hydrocodone-Acetaminophen    • Praluent [Alirocumab]    • Ropinirole Hcl    • Simvastatin    • Statins    • Sulfa Antibiotics    • Sulfur    • Zetia [Ezetimibe]          History of Present Illness   Encounter Diagnoses   Name Primary?   • Controlled type 2 diabetes mellitus with complication, without long-term current use of insulin (CMS/[facility]) Yes   • Neuropathy    • Mixed hyperlipidemia    • Vitamin D deficiency    • Hypothyroidism due to Hashimoto's thyroiditis      69-year-old female patient here today for a follow-up visit.  She has been seen for the above-mentioned problems.  She had recent labs which were reviewed and she was provided a copy.  She is taking her medications as prescribed.  A foot exam was performed performed at today's visit.  She is complaining of pain in her right great toe.  She does have a history of gout.  Her most recent labs reflect uric acid in normal range.  She has been drinking nutritional supplements to help with pain.  She does have a history of peripheral neuropathy.  We discussed increasing gabapentin.  She will be referred to podiatry.  She has no redness or swelling in that toe.  Medication list was reviewed and updated.    The following portions of the patient's history were reviewed and  updated as appropriate: allergies, current medications, past family history, past medical history, past social history, past surgical history and problem list.    Review of Systems   Constitutional: Negative.    HENT: Negative.    Eyes: Negative.    Respiratory: Negative.    Cardiovascular: Negative.    Gastrointestinal: Negative.    Endocrine: Negative.    Genitourinary: Negative.    Musculoskeletal: Negative.    Skin: Negative.    Allergic/Immunologic: Negative.    Neurological: Negative.    Hematological: Negative.    Psychiatric/Behavioral: Negative.        Objective   Physical Exam   Constitutional: She is oriented to person, place, and time. She appears well-developed and well-nourished. No distress.   Tearful and anxious   HENT:   Head: Normocephalic and atraumatic.   Right Ear: External ear normal.   Left Ear: External ear normal.   Nose: Nose normal.   Mouth/Throat: Oropharynx is clear and moist. No oropharyngeal exudate.   Eyes: EOM are normal. Pupils are equal, round, and reactive to light. Right eye exhibits no discharge. Left eye exhibits no discharge.   exopthalic ou   Neck: Trachea normal, normal range of motion and full passive range of motion without pain. Neck supple. No tracheal tenderness present. Carotid bruit is not present. No tracheal deviation, no edema and no erythema present. No thyroid mass and no thyromegaly present.   Cardiovascular: Normal rate, regular rhythm, normal heart sounds and intact distal pulses. Exam reveals no gallop and no friction rub.   No murmur heard.  Pulmonary/Chest: Effort normal and breath sounds normal. No stridor. No respiratory distress. She has no wheezes. She has no rales.   Abdominal: Soft. Bowel sounds are normal. She exhibits no distension.   Musculoskeletal: Normal range of motion. She exhibits no edema or deformity.   Lymphadenopathy:     She has no cervical adenopathy.   Neurological: She is alert and oriented to person, place, and time.   Skin: Skin is  warm and dry. No rash noted. She is not diaphoretic. No erythema. No pallor.   Psychiatric: She has a normal mood and affect. Her behavior is normal. Judgment and thought content normal.   Nursing note and vitals reviewed.      Results for orders placed or performed in visit on 10/25/19   Uric Acid   Result Value Ref Range    Uric Acid 4.3 2.4 - 5.7 mg/dL   Comprehensive Thyroglobulin   Result Value Ref Range    Thyroglobulin Ab <1.0 IU/mL    Thyroglobulin 5.1 ng/mL    Thyroglobulin (TG-HARSH) Comment ng/mL   Thyroid Panel With TSH   Result Value Ref Range    TSH 0.305 (L) 0.450 - 4.500 uIU/mL    T4, Total 9.1 4.5 - 12.0 ug/dL    T3 Uptake 32 24 - 39 %    Free Thyroxine Index 2.9 1.2 - 4.9   T4, Free   Result Value Ref Range    Free T4 1.52 0.93 - 1.70 ng/dL   T3, Free   Result Value Ref Range    T3, Free 3.2 2.0 - 4.4 pg/mL   C-Peptide   Result Value Ref Range    C-Peptide 4.4 1.1 - 4.4 ng/mL   Hemoglobin A1c   Result Value Ref Range    Hemoglobin A1C 6.10 (H) 4.80 - 5.60 %   Vitamin D 25 Hydroxy   Result Value Ref Range    25 Hydroxy, Vitamin D 63.7 30.0 - 100.0 ng/ml   MicroAlbumin, Urine, Random - Urine, Clean Catch   Result Value Ref Range    Microalbumin, Urine 9.6 Not Estab. ug/mL   Lipid Panel   Result Value Ref Range    Total Cholesterol 231 (H) 0 - 200 mg/dL    Triglycerides 194 (H) 0 - 150 mg/dL    HDL Cholesterol 40 40 - 60 mg/dL    VLDL Cholesterol 38.8 mg/dL    LDL Cholesterol  152 (H) 0 - 100 mg/dL   Comprehensive Metabolic Panel   Result Value Ref Range    Glucose 95 65 - 99 mg/dL    BUN 10 8 - 23 mg/dL    Creatinine 0.74 0.57 - 1.00 mg/dL    eGFR Non African Am 78 >60 mL/min/1.73    eGFR African Am 95 >60 mL/min/1.73    BUN/Creatinine Ratio 13.5 7.0 - 25.0    Sodium 142 136 - 145 mmol/L    Potassium 5.0 3.5 - 5.2 mmol/L    Chloride 104 98 - 107 mmol/L    Total CO2 27.8 22.0 - 29.0 mmol/L    Calcium 9.4 8.6 - 10.5 mg/dL    Total Protein 7.3 6.0 - 8.5 g/dL    Albumin 4.70 3.50 - 5.20 g/dL    Globulin  2.6 gm/dL    A/G Ratio 1.8 g/dL    Total Bilirubin 0.2 0.2 - 1.2 mg/dL    Alkaline Phosphatase 73 39 - 117 U/L    AST (SGOT) 15 1 - 32 U/L    ALT (SGPT) 22 1 - 33 U/L   Cardiovascular Risk Assessment   Result Value Ref Range    Interpretation Note    Diabetes Patient Education   Result Value Ref Range    PDF Image Not applicable        Assessment/Plan   Problems Addressed this Visit        Cardiovascular and Mediastinum    Hyperlipidemia       Digestive    Vitamin D deficiency       Endocrine    Hypothyroidism    Controlled type 2 diabetes mellitus (CMS/HCC) - Primary    Relevant Orders    Ambulatory Referral to Podiatry       Nervous and Auditory    Neuropathy    Relevant Orders    Ambulatory Referral to Podiatry        Patient was seen and examined.  A lorenzo was requested and reviewed and is scanned in her medical record.  We discussed other options for treatment of diabetes.  She states her insurance will mostly not cover a lot of the newer medications.  I requested that she get a copy of her insurance formulary prior to her next visit so we can see what is covered.  Her gabapentin has been increased to 300 mg 4 times daily.  She will be referred to podiatry for further evaluation of possible gout and/or neuropathy.  She is to continue all her other medications as prescribed.  Metabolically clinically she is stable.  Her hemoglobin A1c is controlled.  Blood pressures in satisfactory range.  Cholesterol is uncontrolled and patient cannot take statins, Zetia, or praluent due to cost or intolerance.  She is currently not on any medication to help lower her LDL cholesterol.  Thyroid hormones are stable.  Acid is in normal range.  Referral has been placed to Dr. Bauer at [facility]  Continue all current meds the same   Get copy of insurance formulary  Increase gabapentin for neuropathy  Podiatry referral

## 2020-01-02 RX ORDER — ESTERIFIED ESTROGEN AND METHYLTESTOSTERONE .625; 1.25 MG/1; MG/1
1 TABLET ORAL DAILY
Qty: 30 TABLET | Refills: 5 | Status: SHIPPED | OUTPATIENT
Start: 2020-01-02 | End: 2020-07-17 | Stop reason: SDUPTHER

## 2020-01-08 ENCOUNTER — TELEPHONE (OUTPATIENT)
Dept: ENDOCRINOLOGY | Age: 70
End: 2020-01-08

## 2020-01-08 NOTE — TELEPHONE ENCOUNTER
There is no generic unless she just wants to go on plain metformin. Send rx for metformin 1000 mg 1 po bid. D/c nika. Inform pt her bs's may be higher.

## 2020-01-08 NOTE — TELEPHONE ENCOUNTER
Metformin prescription sent to pharmacy. Synjardy D/RILEY in chart. Patient informed of changes and expressed understanding.

## 2020-01-08 NOTE — TELEPHONE ENCOUNTER
Patient is on synjardy xr 25 / 1000  She is looking for the generic   The brand name is to expensive     [facility] to [facility]   30 day supply  90 if possiable       If not generic pt would like a substitute    Thank you

## 2020-01-29 RX ORDER — LEVOTHYROXINE SODIUM 125 MCG
125 TABLET ORAL DAILY
Qty: 90 TABLET | Refills: 0 | Status: SHIPPED | OUTPATIENT
Start: 2020-01-29 | End: 2020-04-28

## 2020-04-28 RX ORDER — ALLOPURINOL 100 MG/1
100 TABLET ORAL DAILY
Qty: 90 TABLET | Refills: 1 | Status: SHIPPED | OUTPATIENT
Start: 2020-04-28 | End: 2020-11-19 | Stop reason: SDUPTHER

## 2020-04-28 RX ORDER — LEVOTHYROXINE SODIUM 125 MCG
TABLET ORAL
Qty: 90 TABLET | Refills: 0 | Status: SHIPPED | OUTPATIENT
Start: 2020-04-28 | End: 2020-07-13

## 2020-05-08 ENCOUNTER — OFFICE VISIT (OUTPATIENT)
Dept: ENDOCRINOLOGY | Age: 70
End: 2020-05-08

## 2020-05-08 VITALS
BODY MASS INDEX: 33.99 KG/M2 | SYSTOLIC BLOOD PRESSURE: 116 MMHG | RESPIRATION RATE: 16 BRPM | DIASTOLIC BLOOD PRESSURE: 74 MMHG | HEIGHT: 65 IN | WEIGHT: 204 LBS

## 2020-05-08 DIAGNOSIS — I10 ESSENTIAL HYPERTENSION: ICD-10-CM

## 2020-05-08 DIAGNOSIS — E11.42 DIABETIC PERIPHERAL NEUROPATHY (HCC): ICD-10-CM

## 2020-05-08 DIAGNOSIS — E79.0 HYPERURICEMIA: ICD-10-CM

## 2020-05-08 DIAGNOSIS — E55.9 VITAMIN D DEFICIENCY: ICD-10-CM

## 2020-05-08 DIAGNOSIS — E03.8 HYPOTHYROIDISM DUE TO HASHIMOTO'S THYROIDITIS: ICD-10-CM

## 2020-05-08 DIAGNOSIS — E06.3 HYPOTHYROIDISM DUE TO HASHIMOTO'S THYROIDITIS: ICD-10-CM

## 2020-05-08 DIAGNOSIS — E11.8 CONTROLLED TYPE 2 DIABETES MELLITUS WITH COMPLICATION, WITHOUT LONG-TERM CURRENT USE OF INSULIN (HCC): Primary | ICD-10-CM

## 2020-05-08 DIAGNOSIS — E78.2 MIXED HYPERLIPIDEMIA: ICD-10-CM

## 2020-05-08 PROCEDURE — 99214 OFFICE O/P EST MOD 30 MIN: CPT | Performed by: NURSE PRACTITIONER

## 2020-05-08 NOTE — PROGRESS NOTES
"Subjective   Jose Keys is a 70 y.o. female is here today for follow-up.  Chief Complaint   Patient presents with   • Diabetes     no lab review; checking BG once a day; no BG readings; denies any problems or concerns    • Hypothyroidism     /74   Resp 16   Ht 165.1 cm (65\")   Wt 92.5 kg (204 lb)   BMI 33.95 kg/m²   Current Outpatient Medications on File Prior to Visit   Medication Sig   • ACCU-CHEK FASTCLIX LANCETS misc Use to test BG 2x daily. DX CODE: E11.65   • allopurinol (ZYLOPRIM) 100 MG tablet TAKE 1 TABLET BY MOUTH DAILY   • Alpha-Lipoic Acid 600 MG capsule Take  by mouth.   • amLODIPine-valsartan (EXFORGE)  MG per tablet Take 1 tablet by mouth Daily.   • ASPIRIN LOW DOSE 81 MG EC tablet Take 81 mg by mouth Daily.   • buPROPion XL (WELLBUTRIN XL) 300 MG 24 hr tablet Take  by mouth Daily.   • carvedilol (COREG) 3.125 MG tablet Take 1 tablet by mouth 2 (Two) Times a Day With Meals.   • cetirizine (zyrTEC) 5 MG tablet Take 5 mg by mouth.   • ergocalciferol (DRISDOL) 45574 units capsule Take 1 capsule by mouth 2 (Two) Times a Week.   • escitalopram (LEXAPRO) 10 MG tablet Take  by mouth Daily.   • estrogens, conjugated,-methyltestosterone (EST ESTROGENS-METHYLTEST HS) 0.625-1.25 MG per tablet Take 1 tablet by mouth Daily.   • glucose blood (ACCU-CHEK SMARTVIEW) test strip Use to test BG 2x daily   • Lancet Devices (ACCU-CHEK SOFTCLIX) lancets Use as instructed   • metFORMIN (GLUCOPHAGE) 1000 MG tablet Take 1 tablet by mouth 2 (Two) Times a Day With Meals.   • montelukast (SINGULAIR) 10 MG tablet Take 10 mg by mouth Daily.   • Multiple Vitamins-Minerals (MULTIVITAMIN PO) Take 1 tablet by mouth daily.   • Multiple Vitamins-Minerals (OCUVITE PO) Take 1 tablet by mouth daily.   • SYNTHROID 125 MCG tablet TAKE ONE TABLET BY MOUTH EVERY DAY   • vitamin D (ERGOCALCIFEROL) 37713 units capsule capsule TAKE 1 CAPSULE BY MOUTH EVERY 7 DAYS     No current facility-administered medications on file prior to " visit.      Family History   Problem Relation Age of Onset   • Diabetes Father    • Hypertension Father    • Diabetes Sister      Social History     Tobacco Use   • Smoking status: Never Smoker   • Smokeless tobacco: Never Used   Substance Use Topics   • Alcohol use: Yes     Comment: socially    • Drug use: No     Allergies   Allergen Reactions   • Atorvastatin    • Azithromycin    • Erythromycin    • Ezetimibe-Simvastatin    • Hydrocodone-Acetaminophen    • Praluent [Alirocumab]    • Ropinirole Hcl    • Simvastatin    • Statins    • Sulfa Antibiotics    • Sulfur    • Zetia [Ezetimibe]          History of Present Illness   Encounter Diagnoses   Name Primary?   • Controlled type 2 diabetes mellitus with complication, without long-term current use of insulin (CMS/HCC) Yes   • Diabetic peripheral neuropathy (CMS/HCC)    • Mixed hyperlipidemia    • Essential hypertension    • Hyperuricemia    • Hypothyroidism due to Hashimoto's thyroiditis    • Vitamin D deficiency      70-year-old female patient being evaluated today in the office for the above diagnoses.  She has not had recent labs done.  Her previous labs were reviewed.  She denies any physical changes or complaints at today's visit.  Her medication list was reviewed and updated.  She is taking her medications as prescribed.  She currently checks her blood sugars once daily.  She did not bring her blood glucose meter to today's visit.  She has had no hypoglycemic events.  She denies any signs and symptoms of hyper or hypothyroidism.    The following portions of the patient's history were reviewed and updated as appropriate: allergies, current medications, past family history, past medical history, past social history, past surgical history and problem list.    Review of Systems   Constitutional: Negative for fatigue and fever.   Respiratory: Negative for cough and shortness of breath.    Gastrointestinal: Negative for constipation and diarrhea.   Endocrine: Negative  for cold intolerance and heat intolerance.   Psychiatric/Behavioral: Negative for sleep disturbance.       Objective   Physical Exam   Constitutional: She is oriented to person, place, and time. She appears well-developed and well-nourished. No distress.   HENT:   Head: Normocephalic and atraumatic.   Right Ear: External ear normal.   Left Ear: External ear normal.   Nose: Nose normal.   Mouth/Throat: Oropharynx is clear and moist. No oropharyngeal exudate.   Eyes: Pupils are equal, round, and reactive to light. EOM are normal. Right eye exhibits no discharge. Left eye exhibits no discharge.   exopthalic ou   Neck: Trachea normal, normal range of motion and full passive range of motion without pain. Neck supple. No tracheal tenderness present. Carotid bruit is not present. No tracheal deviation, no edema and no erythema present. No thyroid mass and no thyromegaly present.   Cardiovascular: Normal rate, regular rhythm, normal heart sounds and intact distal pulses. Exam reveals no gallop and no friction rub.   No murmur heard.  Pulmonary/Chest: Effort normal and breath sounds normal. No stridor. No respiratory distress. She has no wheezes. She has no rales.   Abdominal: Soft. Bowel sounds are normal. She exhibits no distension.   Musculoskeletal: Normal range of motion. She exhibits no edema or deformity.   Lymphadenopathy:     She has no cervical adenopathy.   Neurological: She is alert and oriented to person, place, and time.   Skin: Skin is warm and dry. No rash noted. She is not diaphoretic. No erythema. No pallor.   Psychiatric: She has a normal mood and affect. Her behavior is normal. Judgment and thought content normal.   Nursing note and vitals reviewed.    Results for orders placed or performed in visit on 10/25/19   Uric Acid   Result Value Ref Range    Uric Acid 4.3 2.4 - 5.7 mg/dL   Comprehensive Thyroglobulin   Result Value Ref Range    Thyroglobulin Ab <1.0 IU/mL    Thyroglobulin 5.1 ng/mL     Thyroglobulin (TG-HARSH) Comment ng/mL   Thyroid Panel With TSH   Result Value Ref Range    TSH 0.305 (L) 0.450 - 4.500 uIU/mL    T4, Total 9.1 4.5 - 12.0 ug/dL    T3 Uptake 32 24 - 39 %    Free Thyroxine Index 2.9 1.2 - 4.9   T4, Free   Result Value Ref Range    Free T4 1.52 0.93 - 1.70 ng/dL   T3, Free   Result Value Ref Range    T3, Free 3.2 2.0 - 4.4 pg/mL   C-Peptide   Result Value Ref Range    C-Peptide 4.4 1.1 - 4.4 ng/mL   Hemoglobin A1c   Result Value Ref Range    Hemoglobin A1C 6.10 (H) 4.80 - 5.60 %   Vitamin D 25 Hydroxy   Result Value Ref Range    25 Hydroxy, Vitamin D 63.7 30.0 - 100.0 ng/ml   MicroAlbumin, Urine, Random - Urine, Clean Catch   Result Value Ref Range    Microalbumin, Urine 9.6 Not Estab. ug/mL   Lipid Panel   Result Value Ref Range    Total Cholesterol 231 (H) 0 - 200 mg/dL    Triglycerides 194 (H) 0 - 150 mg/dL    HDL Cholesterol 40 40 - 60 mg/dL    VLDL Cholesterol 38.8 mg/dL    LDL Cholesterol  152 (H) 0 - 100 mg/dL   Comprehensive Metabolic Panel   Result Value Ref Range    Glucose 95 65 - 99 mg/dL    BUN 10 8 - 23 mg/dL    Creatinine 0.74 0.57 - 1.00 mg/dL    eGFR Non African Am 78 >60 mL/min/1.73    eGFR African Am 95 >60 mL/min/1.73    BUN/Creatinine Ratio 13.5 7.0 - 25.0    Sodium 142 136 - 145 mmol/L    Potassium 5.0 3.5 - 5.2 mmol/L    Chloride 104 98 - 107 mmol/L    Total CO2 27.8 22.0 - 29.0 mmol/L    Calcium 9.4 8.6 - 10.5 mg/dL    Total Protein 7.3 6.0 - 8.5 g/dL    Albumin 4.70 3.50 - 5.20 g/dL    Globulin 2.6 gm/dL    A/G Ratio 1.8 g/dL    Total Bilirubin 0.2 0.2 - 1.2 mg/dL    Alkaline Phosphatase 73 39 - 117 U/L    AST (SGOT) 15 1 - 32 U/L    ALT (SGPT) 22 1 - 33 U/L   Cardiovascular Risk Assessment   Result Value Ref Range    Interpretation Note    Diabetes Patient Education   Result Value Ref Range    PDF Image Not applicable          Assessment/Plan   Problems Addressed this Visit        Cardiovascular and Mediastinum    Hyperlipidemia    Relevant Orders     Comprehensive Metabolic Panel    C-Peptide    Hemoglobin A1c    Lipid Panel    MicroAlbumin, Urine, Random - Urine, Clean Catch    T3, Free    T4, Free    Thyroid Panel With TSH    Vitamin D 25 Hydroxy    Uric Acid    Hypertension    Relevant Orders    Comprehensive Metabolic Panel    C-Peptide    Hemoglobin A1c    Lipid Panel    MicroAlbumin, Urine, Random - Urine, Clean Catch    T3, Free    T4, Free    Thyroid Panel With TSH    Vitamin D 25 Hydroxy    Uric Acid       Digestive    Vitamin D deficiency    Relevant Orders    Comprehensive Metabolic Panel    C-Peptide    Hemoglobin A1c    Lipid Panel    MicroAlbumin, Urine, Random - Urine, Clean Catch    T3, Free    T4, Free    Thyroid Panel With TSH    Vitamin D 25 Hydroxy    Uric Acid       Endocrine    Hypothyroidism    Relevant Orders    Comprehensive Metabolic Panel    C-Peptide    Hemoglobin A1c    Lipid Panel    MicroAlbumin, Urine, Random - Urine, Clean Catch    T3, Free    T4, Free    Thyroid Panel With TSH    Vitamin D 25 Hydroxy    Uric Acid    Controlled type 2 diabetes mellitus (CMS/HCC) - Primary    Relevant Orders    Comprehensive Metabolic Panel    C-Peptide    Hemoglobin A1c    Lipid Panel    MicroAlbumin, Urine, Random - Urine, Clean Catch    T3, Free    T4, Free    Thyroid Panel With TSH    Vitamin D 25 Hydroxy    Uric Acid       Nervous and Auditory    Diabetic peripheral neuropathy (CMS/HCC)    Relevant Orders    Comprehensive Metabolic Panel    C-Peptide    Hemoglobin A1c    Lipid Panel    MicroAlbumin, Urine, Random - Urine, Clean Catch    T3, Free    T4, Free    Thyroid Panel With TSH    Vitamin D 25 Hydroxy    Uric Acid       Other    Hyperuricemia    Relevant Orders    Comprehensive Metabolic Panel    C-Peptide    Hemoglobin A1c    Lipid Panel    MicroAlbumin, Urine, Random - Urine, Clean Catch    T3, Free    T4, Free    Thyroid Panel With TSH    Vitamin D 25 Hydroxy    Uric Acid          In summary patient was seen and evaluated.   Metabolically and clinically she presents stable.  She will have extensive labs at today's visit will be notified of the results along with any further recommendations.  Her medication list was reviewed and updated.  Her previous labs were reviewed.  No medications were added or changed at today's visit.  She denies needing any refills.  She will follow-up in 6 months with labs prior.  Her blood pressure is in satisfactory range.  She has been encouraged to diet exercise for weight loss.  She is observing social distance and guidelines.

## 2020-05-13 LAB
25(OH)D3+25(OH)D2 SERPL-MCNC: 66.3 NG/ML (ref 30–100)
ALBUMIN SERPL-MCNC: 4.8 G/DL (ref 3.5–5.2)
ALBUMIN/GLOB SERPL: 1.8 G/DL
ALP SERPL-CCNC: 67 U/L (ref 39–117)
ALT SERPL-CCNC: 29 U/L (ref 1–33)
AST SERPL-CCNC: 16 U/L (ref 1–32)
BILIRUB SERPL-MCNC: 0.3 MG/DL (ref 0.2–1.2)
BUN SERPL-MCNC: 11 MG/DL (ref 8–23)
BUN/CREAT SERPL: 16.4 (ref 7–25)
C PEPTIDE SERPL-MCNC: 4 NG/ML (ref 1.1–4.4)
CALCIUM SERPL-MCNC: 9.3 MG/DL (ref 8.6–10.5)
CHLORIDE SERPL-SCNC: 105 MMOL/L (ref 98–107)
CHOLEST SERPL-MCNC: 252 MG/DL (ref 0–200)
CO2 SERPL-SCNC: 22.3 MMOL/L (ref 22–29)
CREAT SERPL-MCNC: 0.67 MG/DL (ref 0.57–1)
FT4I SERPL CALC-MCNC: 1.9 (ref 1.2–4.9)
GLOBULIN SER CALC-MCNC: 2.6 GM/DL
GLUCOSE SERPL-MCNC: 102 MG/DL (ref 65–99)
HBA1C MFR BLD: 6.1 % (ref 4.8–5.6)
HDLC SERPL-MCNC: 48 MG/DL (ref 40–60)
INTERPRETATION: NORMAL
LDLC SERPL CALC-MCNC: 170 MG/DL (ref 0–100)
Lab: NORMAL
MICROALBUMIN UR-MCNC: 40.2 UG/ML
POTASSIUM SERPL-SCNC: 4.7 MMOL/L (ref 3.5–5.2)
PROT SERPL-MCNC: 7.4 G/DL (ref 6–8.5)
SODIUM SERPL-SCNC: 145 MMOL/L (ref 136–145)
T3FREE SERPL-MCNC: 2.9 PG/ML (ref 2–4.4)
T3RU NFR SERPL: 25 % (ref 24–39)
T4 FREE SERPL-MCNC: 1.29 NG/DL (ref 0.93–1.7)
T4 SERPL-MCNC: 7.6 UG/DL (ref 4.5–12)
TRIGL SERPL-MCNC: 169 MG/DL (ref 0–150)
TSH SERPL DL<=0.005 MIU/L-ACNC: 1.3 UIU/ML (ref 0.45–4.5)
URATE SERPL-MCNC: 5.3 MG/DL (ref 2.4–5.7)
VLDLC SERPL CALC-MCNC: 33.8 MG/DL

## 2020-07-10 RX ORDER — GABAPENTIN 300 MG/1
CAPSULE ORAL
Qty: 360 CAPSULE | Refills: 0 | Status: SHIPPED | OUTPATIENT
Start: 2020-07-10 | End: 2020-11-19 | Stop reason: SDUPTHER

## 2020-07-13 RX ORDER — LEVOTHYROXINE SODIUM 125 MCG
TABLET ORAL
Qty: 90 TABLET | Refills: 0 | Status: SHIPPED | OUTPATIENT
Start: 2020-07-13 | End: 2020-11-05

## 2020-07-17 RX ORDER — ESTERIFIED ESTROGEN AND METHYLTESTOSTERONE .625; 1.25 MG/1; MG/1
1 TABLET ORAL DAILY
Qty: 30 TABLET | Refills: 5 | Status: SHIPPED | OUTPATIENT
Start: 2020-07-17 | End: 2020-11-19 | Stop reason: SDUPTHER

## 2020-08-04 RX ORDER — ERGOCALCIFEROL 1.25 MG/1
50000 CAPSULE ORAL
Qty: 13 CAPSULE | Refills: 0 | Status: SHIPPED | OUTPATIENT
Start: 2020-08-04 | End: 2020-09-21

## 2020-09-21 RX ORDER — ERGOCALCIFEROL 1.25 MG/1
CAPSULE ORAL
Qty: 13 CAPSULE | Refills: 0 | Status: SHIPPED | OUTPATIENT
Start: 2020-09-21 | End: 2020-11-19 | Stop reason: SDUPTHER

## 2020-10-12 NOTE — PROGRESS NOTES
Subjective Finding:    Chief compalint: Patient presents with:  Back Pain  , Pain Scale: 8/10, Intensity: sharp, Duration: 1 months, Change since last visit: , Radiating: down left leg.    Date of injury:     Activities that the pain restricts:   Home/household activities: yes.  Work duties: no.  Hobbies/social: yes.  Sleep: yes.  Makes symptoms better: ice  and rest.  Makes symptoms worse: activity and bending.  Have you seen anyone else for the symptoms? No.  Work related: no.  Automobile related injury: no.    Objective and Assessment:    Posture Analysis:   High shoulder: right.  Head tilt: right.  High iliac crest: left.  Head carriage: forward.  Thoracic Kyphosis: neutral.  Lumbar Lordosis: neutral.    Lumbar Range of Motion: extension decreased.  Cervical Range of Motion: .  Thoracic Range of Motion: .  Extremity Range of Motion: hip decreased.    Palpation:   Quad lumb: left, referred pain: yes  TFL: left, referred pain: yes    Segmental dysfunction pre-treatment: T56 [id number].    Assessment post-treatment:  Cervical: ROM increased.  Thoracic: ROM increased.  Lumbar: ROM increased and pain and tenderness decreased.    Comments: .      Complicating Factors: .    Plan / Procedure:    Expected release date: .  Treatment plan: 2 times per week for 2 weeks.  Instructed patient: stretch as instructed at visit.  Short term goals: reduce pain.  Long term goals: restore normal function.  Prognosis: excellent.              Physical Therapy Daily Progress Note  Visits:14    Subjective : Jose Keys reports: I will be having an MRI tomorrow at noon of the back and hip area to see what's going on.  I'll follow up with Dr. Burden to review the results.  I still have a lot of pain at night trying to sleep.  It wakes me and I try to stretch to help fall back to sleep.      Objective:    See Exercise, Manual, and Modality Logs for complete treatment.      Assessment/Plan:Pt tolerated treatment well and reported alleviation from positional distraction for R lumbar spine.  She was educated on appropriate positioning at home and hold times.  Will follow up as needed after MD appointment per physician recommendation.     Progress per Plan of Care     Manual Therapy:    15     mins  79812;  Therapeutic Exercise:    20     mins  44310;     Neuromuscular Jimmie:    -    mins  51543;    Therapeutic Activity:     -     mins  97592;     Gait Training:      -     mins  02992;     Ultrasound:     -     mins  97373;    Electrical Stimulation:    -     mins  54323 ( );  Dry Needling     -     mins self-pay    Timed Treatment:   35   mins   Total Treatment:     35   mins    NADEEN [employer] License #[id number]    Physical Therapist

## 2020-10-26 ENCOUNTER — LAB (OUTPATIENT)
Dept: ENDOCRINOLOGY | Age: 70
End: 2020-10-26

## 2020-10-26 DIAGNOSIS — I10 ESSENTIAL HYPERTENSION: ICD-10-CM

## 2020-10-26 DIAGNOSIS — E79.0 HYPERURICEMIA: ICD-10-CM

## 2020-10-26 DIAGNOSIS — E55.9 VITAMIN D DEFICIENCY: ICD-10-CM

## 2020-10-26 DIAGNOSIS — E78.2 MIXED HYPERLIPIDEMIA: Primary | ICD-10-CM

## 2020-10-26 DIAGNOSIS — E03.8 HYPOTHYROIDISM DUE TO HASHIMOTO'S THYROIDITIS: ICD-10-CM

## 2020-10-26 DIAGNOSIS — E06.3 HYPOTHYROIDISM DUE TO HASHIMOTO'S THYROIDITIS: ICD-10-CM

## 2020-10-26 DIAGNOSIS — R80.9 PROTEINURIA, UNSPECIFIED TYPE: ICD-10-CM

## 2020-10-26 DIAGNOSIS — R53.82 CHRONIC FATIGUE: ICD-10-CM

## 2020-10-26 DIAGNOSIS — E11.69 TYPE 2 DIABETES MELLITUS WITH OTHER SPECIFIED COMPLICATION, UNSPECIFIED WHETHER LONG TERM INSULIN USE (HCC): ICD-10-CM

## 2020-10-27 LAB
25(OH)D3+25(OH)D2 SERPL-MCNC: 53.8 NG/ML (ref 30–100)
ALBUMIN SERPL-MCNC: 4.5 G/DL (ref 3.5–5.2)
ALBUMIN/GLOB SERPL: 1.9 G/DL
ALP SERPL-CCNC: 75 U/L (ref 39–117)
ALT SERPL-CCNC: 33 U/L (ref 1–33)
AST SERPL-CCNC: 13 U/L (ref 1–32)
BILIRUB SERPL-MCNC: 0.2 MG/DL (ref 0–1.2)
BUN SERPL-MCNC: 11 MG/DL (ref 8–23)
BUN/CREAT SERPL: 15.1 (ref 7–25)
C PEPTIDE SERPL-MCNC: 4.5 NG/ML (ref 1.1–4.4)
CALCIUM SERPL-MCNC: 8.7 MG/DL (ref 8.6–10.5)
CHLORIDE SERPL-SCNC: 105 MMOL/L (ref 98–107)
CHOLEST SERPL-MCNC: 236 MG/DL (ref 0–200)
CO2 SERPL-SCNC: 27.5 MMOL/L (ref 22–29)
CREAT SERPL-MCNC: 0.73 MG/DL (ref 0.57–1)
FT4I SERPL CALC-MCNC: 2.2 (ref 1.2–4.9)
GLOBULIN SER CALC-MCNC: 2.4 GM/DL
GLUCOSE SERPL-MCNC: 93 MG/DL (ref 65–99)
HBA1C MFR BLD: 6.3 % (ref 4.8–5.6)
HDLC SERPL-MCNC: 38 MG/DL (ref 40–60)
INTERPRETATION: NORMAL
LDLC SERPL CALC-MCNC: 164 MG/DL (ref 0–100)
Lab: NORMAL
MICROALBUMIN UR-MCNC: 18.1 UG/ML
POTASSIUM SERPL-SCNC: 5 MMOL/L (ref 3.5–5.2)
PROT SERPL-MCNC: 6.9 G/DL (ref 6–8.5)
SODIUM SERPL-SCNC: 141 MMOL/L (ref 136–145)
T3FREE SERPL-MCNC: 2.7 PG/ML (ref 2–4.4)
T3RU NFR SERPL: 27 % (ref 24–39)
T4 FREE SERPL-MCNC: 1.42 NG/DL (ref 0.93–1.7)
T4 SERPL-MCNC: 8.1 UG/DL (ref 4.5–12)
TRIGL SERPL-MCNC: 185 MG/DL (ref 0–150)
TSH SERPL DL<=0.005 MIU/L-ACNC: 1.59 UIU/ML (ref 0.45–4.5)
URATE SERPL-MCNC: 4.9 MG/DL (ref 2.4–5.7)
VLDLC SERPL CALC-MCNC: 34 MG/DL (ref 5–40)

## 2020-11-05 RX ORDER — LEVOTHYROXINE SODIUM 125 MCG
TABLET ORAL
Qty: 90 TABLET | Refills: 0 | Status: SHIPPED | OUTPATIENT
Start: 2020-11-05 | End: 2021-02-08

## 2020-11-19 ENCOUNTER — OFFICE VISIT (OUTPATIENT)
Dept: ENDOCRINOLOGY | Age: 70
End: 2020-11-19

## 2020-11-19 DIAGNOSIS — I10 ESSENTIAL HYPERTENSION: ICD-10-CM

## 2020-11-19 DIAGNOSIS — E55.9 VITAMIN D DEFICIENCY: ICD-10-CM

## 2020-11-19 DIAGNOSIS — E06.3 HYPOTHYROIDISM DUE TO HASHIMOTO'S THYROIDITIS: ICD-10-CM

## 2020-11-19 DIAGNOSIS — E03.8 HYPOTHYROIDISM DUE TO HASHIMOTO'S THYROIDITIS: ICD-10-CM

## 2020-11-19 DIAGNOSIS — E78.2 MIXED HYPERLIPIDEMIA: ICD-10-CM

## 2020-11-19 DIAGNOSIS — E11.8 CONTROLLED TYPE 2 DIABETES MELLITUS WITH COMPLICATION, WITHOUT LONG-TERM CURRENT USE OF INSULIN (HCC): Primary | ICD-10-CM

## 2020-11-19 PROCEDURE — 99442 PR PHYS/QHP TELEPHONE EVALUATION 11-20 MIN: CPT | Performed by: NURSE PRACTITIONER

## 2020-11-19 RX ORDER — GABAPENTIN 300 MG/1
300 CAPSULE ORAL 4 TIMES DAILY
Qty: 360 CAPSULE | Refills: 0 | Status: SHIPPED | OUTPATIENT
Start: 2020-11-19 | End: 2021-07-13 | Stop reason: SDUPTHER

## 2020-11-19 RX ORDER — ALLOPURINOL 100 MG/1
100 TABLET ORAL DAILY
Qty: 90 TABLET | Refills: 1 | Status: SHIPPED | OUTPATIENT
Start: 2020-11-19 | End: 2021-07-12 | Stop reason: SDUPTHER

## 2020-11-19 RX ORDER — AMLODIPINE AND VALSARTAN 10; 320 MG/1; MG/1
1 TABLET ORAL DAILY
Qty: 90 TABLET | Refills: 1 | Status: SHIPPED | OUTPATIENT
Start: 2020-11-19 | End: 2021-07-12 | Stop reason: SDUPTHER

## 2020-11-19 RX ORDER — ERGOCALCIFEROL 1.25 MG/1
CAPSULE ORAL
Qty: 24 CAPSULE | Refills: 1 | Status: SHIPPED | OUTPATIENT
Start: 2020-11-19 | End: 2021-05-14 | Stop reason: SDUPTHER

## 2020-11-19 NOTE — TELEPHONE ENCOUNTER
Patient had labs drawn   Appointment was cancelled by us    She needs lab results reviewed due to needing her prescripton every 6 months    Neel is booking till may      Gabapentin   estratest     She needs [facility]     Please put her on a wait list to get her in here     I will look for a spot also

## 2020-11-19 NOTE — PROGRESS NOTES
Subjective    Jose Keys is a 70 y.o. female. she is here today for follow-up.  Chief Complaint   Patient presents with   • Diabetes     F/U TYPE 2 DIABETES MELLLTUS, RECENT LABS, CHECKS BS once a day,  eye exam 2020 catarat surgery   • Med Refill     needs refills     You have chosen to receive care through a telephone visit. Do you consent to use a telephone visit for your medical care today? Yes  Total time 17 min  History of Present Illness  Encounter Diagnoses   Name Primary?   • Controlled type 2 diabetes mellitus with complication, without long-term current use of insulin (CMS/HCC) Yes   • Mixed hyperlipidemia    • Essential hypertension    • Vitamin D deficiency    • Hypothyroidism due to Hashimoto's thyroiditis    70-year-old female patient evaluated today via telemedicine for the above diagnoses.  She is needing her medication refills.  A Isra has been reviewed.  She has no complaints or concerns.  She has had a recent mammogram.  She no longer gets Pap smears.  She denies any complaints or concerns.  She denies any symptoms associated with hyper or hypothyroidism.  Patient had recent labs which were reviewed.    The following portions of the patient's history were reviewed and updated as appropriate:   Past Medical History:   Diagnosis Date   • Allergic     foods (soy, gluten, egg yolk)    • Anxiety    • Arthritis    • Cataract    • Headache     Stress induced   • Hyperlipidemia    • Hypertension    • Hypothyroidism    • Neuropathy    • Type 2 diabetes mellitus (CMS/HCC)    • Urinary tract infection    • Vitamin D deficiency      Past Surgical History:   Procedure Laterality Date   • HYSTERECTOMY     • MAMMO BILATERAL  03/28/2017   • TUBAL ABDOMINAL LIGATION       Family History   Problem Relation Age of Onset   • Diabetes Father    • Hypertension Father    • Diabetes Sister      OB History    No obstetric history on file.       Current Outpatient Medications   Medication Sig Dispense Refill   •  ACCU-CHEK FASTCLIX LANCETS misc Use to test BG 2x daily. DX CODE: E11.65 200 each 1   • allopurinol (ZYLOPRIM) 100 MG tablet TAKE 1 TABLET BY MOUTH DAILY 90 tablet 1   • Alpha-Lipoic Acid 600 MG capsule Take  by mouth.     • amLODIPine-valsartan (EXFORGE)  MG per tablet Take 1 tablet by mouth Daily. 90 tablet 1   • ASPIRIN LOW DOSE 81 MG EC tablet Take 81 mg by mouth Daily.  11   • buPROPion XL (WELLBUTRIN XL) 300 MG 24 hr tablet Take  by mouth Daily.  2   • carvedilol (COREG) 3.125 MG tablet Take 1 tablet by mouth 2 (Two) Times a Day With Meals. 180 tablet 1   • cetirizine (zyrTEC) 5 MG tablet Take 5 mg by mouth.     • escitalopram (LEXAPRO) 10 MG tablet Take  by mouth Daily.  2   • estrogens, conjugated,-methyltestosterone (Est Estrogens-Methyltest HS) 0.625-1.25 MG per tablet Take 1 tablet by mouth Daily. 30 tablet 5   • gabapentin (NEURONTIN) 300 MG capsule TAKE 1 CAPSULE BY MOUTH FOUR TIMES DAILY 360 capsule 0   • glucose blood (ACCU-CHEK SMARTVIEW) test strip Use to test BG 2x daily 200 each 1   • Lancet Devices (ACCU-CHEK SOFTCLIX) lancets Use as instructed 1 each 0   • metFORMIN (GLUCOPHAGE) 1000 MG tablet Take 1 tablet by mouth 2 (Two) Times a Day With Meals. 60 tablet 2   • montelukast (SINGULAIR) 10 MG tablet Take 10 mg by mouth Daily.  0   • Multiple Vitamins-Minerals (MULTIVITAMIN PO) Take 1 tablet by mouth daily.     • Multiple Vitamins-Minerals (OCUVITE PO) Take 1 tablet by mouth daily.     • Synthroid 125 MCG tablet TAKE 1 TABLET BY MOUTH EVERY DAY 90 tablet 0   • vitamin D (ERGOCALCIFEROL) 1.25 MG (66552 UT) capsule capsule TAKE 1 CAPSULE BY MOUTH EVERY 7 DAYS 13 capsule 0     No current facility-administered medications for this visit.      Allergies   Allergen Reactions   • Atorvastatin    • Azithromycin    • Erythromycin    • Ezetimibe-Simvastatin    • Hydrocodone-Acetaminophen    • Praluent [Alirocumab]    • Ropinirole Hcl    • Simvastatin    • Statins    • Sulfa Antibiotics    • Sulfur     • Zetia [Ezetimibe]      Social History     Socioeconomic History   • Marital status:      Spouse name: Not on file   • Number of children: Not on file   • Years of education: Not on file   • Highest education level: Not on file   Tobacco Use   • Smoking status: Never Smoker   • Smokeless tobacco: Never Used   Substance and Sexual Activity   • Alcohol use: Yes     Comment: socially    • Drug use: No       Review of Systems  Review of Systems   Constitutional: Negative.    Cardiovascular: Negative.    Gastrointestinal: Negative.    Endocrine: Negative.    Neurological: Negative.         Objective    There were no vitals taken for this visit.  Physical Exam  Constitutional:       General: She is not in acute distress.     Appearance: She is well-developed.      Comments:  pleasant, no distress   Pulmonary:      Effort: Pulmonary effort is normal. No respiratory distress.   Neurological:      Mental Status: She is alert and oriented to person, place, and time.   Psychiatric:         Thought Content: Thought content normal.         Lab Review  Sodium (mmol/L)   Date Value   10/26/2020 141   05/12/2020 145   10/25/2019 142     Potassium (mmol/L)   Date Value   10/26/2020 5.0   05/12/2020 4.7   10/25/2019 5.0     Chloride (mmol/L)   Date Value   10/26/2020 105   05/12/2020 105   10/25/2019 104     Total CO2 (mmol/L)   Date Value   10/26/2020 27.5   05/12/2020 22.3   10/25/2019 27.8     BUN (mg/dL)   Date Value   10/26/2020 11   05/12/2020 11   10/25/2019 10     Creatinine (mg/dL)   Date Value   10/26/2020 0.73   05/12/2020 0.67   10/25/2019 0.74     Hemoglobin A1C (%)   Date Value   10/26/2020 6.30 (H)   05/12/2020 6.10 (H)   10/25/2019 6.10 (H)     Triglycerides (mg/dL)   Date Value   10/26/2020 185 (H)   05/12/2020 169 (H)   10/25/2019 194 (H)     LDL Cholesterol  (mg/dL)   Date Value   05/12/2020 170 (H)   10/25/2019 152 (H)   04/24/2019 147 (H)     LDL Chol Calc (NIH) (mg/dL)   Date Value   10/26/2020 164 (H)        Assessment/Plan    No diagnosis found..  Encounter Diagnoses   Name Primary?   • Controlled type 2 diabetes mellitus with complication, without long-term current use of insulin (CMS/[facility]) Yes   • Mixed hyperlipidemia    • Essential hypertension    • Vitamin D deficiency    • Hypothyroidism due to Hashimoto's thyroiditis        Medications prescribed:  Outpatient Encounter Medications as of 11/19/2020   Medication Sig Dispense Refill   • ACCU-CHEK FASTCLIX LANCETS misc Use to test BG 2x daily. DX CODE: E11.65 200 each 1   • allopurinol (ZYLOPRIM) 100 MG tablet TAKE 1 TABLET BY MOUTH DAILY 90 tablet 1   • Alpha-Lipoic Acid 600 MG capsule Take  by mouth.     • amLODIPine-valsartan (EXFORGE)  MG per tablet Take 1 tablet by mouth Daily. 90 tablet 1   • ASPIRIN LOW DOSE 81 MG EC tablet Take 81 mg by mouth Daily.  11   • buPROPion XL (WELLBUTRIN XL) 300 MG 24 hr tablet Take  by mouth Daily.  2   • carvedilol (COREG) 3.125 MG tablet Take 1 tablet by mouth 2 (Two) Times a Day With Meals. 180 tablet 1   • cetirizine (zyrTEC) 5 MG tablet Take 5 mg by mouth.     • escitalopram (LEXAPRO) 10 MG tablet Take  by mouth Daily.  2   • estrogens, conjugated,-methyltestosterone (Est Estrogens-Methyltest HS) 0.625-1.25 MG per tablet Take 1 tablet by mouth Daily. 30 tablet 5   • gabapentin (NEURONTIN) 300 MG capsule TAKE 1 CAPSULE BY MOUTH FOUR TIMES DAILY 360 capsule 0   • glucose blood (ACCU-CHEK SMARTVIEW) test strip Use to test BG 2x daily 200 each 1   • Lancet Devices (ACCU-CHEK SOFTCLIX) lancets Use as instructed 1 each 0   • metFORMIN (GLUCOPHAGE) 1000 MG tablet Take 1 tablet by mouth 2 (Two) Times a Day With Meals. 60 tablet 2   • montelukast (SINGULAIR) 10 MG tablet Take 10 mg by mouth Daily.  0   • Multiple Vitamins-Minerals (MULTIVITAMIN PO) Take 1 tablet by mouth daily.     • Multiple Vitamins-Minerals (OCUVITE PO) Take 1 tablet by mouth daily.     • Synthroid 125 MCG tablet TAKE 1 TABLET BY MOUTH EVERY DAY 90  tablet 0   • vitamin D (ERGOCALCIFEROL) 1.25 MG (55845 UT) capsule capsule TAKE 1 CAPSULE BY MOUTH EVERY 7 DAYS 13 capsule 0     No facility-administered encounter medications on file as of 11/19/2020.        Orders placed during this encounter include:  No orders of the defined types were placed in this encounter.  In summary patient was evaluated via telemedicine.  She will continue her current medications as prescribed.  No medications have been added or change.  A Isra was reviewed.  Her controlled substances were sent to her pharmacy for refill.  She will follow-up in 6 months with labs prior.  She has been encouraged to reach out to the office should she have any questions or concerns.  Metabolically and clinically she is stable.  Her hemoglobin A1c reflects good glycemic control.  She is intolerant of statin therapy.  She is intolerant of Praluent.  Her cholesterol remains elevated.  I have requested staff reach out to her to schedule follow-up appointment in 6 months.

## 2021-02-08 RX ORDER — LEVOTHYROXINE SODIUM 125 MCG
TABLET ORAL
Qty: 90 TABLET | Refills: 0 | Status: SHIPPED | OUTPATIENT
Start: 2021-02-08 | End: 2021-05-14 | Stop reason: SDUPTHER

## 2021-04-01 ENCOUNTER — TELEPHONE (OUTPATIENT)
Dept: ENDOCRINOLOGY | Age: 71
End: 2021-04-01

## 2021-04-01 NOTE — TELEPHONE ENCOUNTER
Pt saw colby and sandi  Will be seeing mike in April  Pt is out of  estrSyapset  [facility]    30 day supply    Pt is out

## 2021-04-02 ENCOUNTER — TELEPHONE (OUTPATIENT)
Dept: ENDOCRINOLOGY | Age: 71
End: 2021-04-02

## 2021-04-02 NOTE — TELEPHONE ENCOUNTER
I sent message yesterday about the pt estrotest  She said it still isnt at the pharmacy     [facility] to [facility]     Thank you

## 2021-04-08 ENCOUNTER — OFFICE VISIT (OUTPATIENT)
Dept: ENDOCRINOLOGY | Age: 71
End: 2021-04-08

## 2021-04-08 VITALS
HEIGHT: 65 IN | BODY MASS INDEX: 33.85 KG/M2 | HEART RATE: 66 BPM | WEIGHT: 203.2 LBS | DIASTOLIC BLOOD PRESSURE: 70 MMHG | SYSTOLIC BLOOD PRESSURE: 108 MMHG | OXYGEN SATURATION: 98 %

## 2021-04-08 DIAGNOSIS — E78.2 MIXED HYPERLIPIDEMIA: ICD-10-CM

## 2021-04-08 DIAGNOSIS — E06.3 HYPOTHYROIDISM DUE TO HASHIMOTO'S THYROIDITIS: ICD-10-CM

## 2021-04-08 DIAGNOSIS — E11.8 CONTROLLED TYPE 2 DIABETES MELLITUS WITH COMPLICATION, WITHOUT LONG-TERM CURRENT USE OF INSULIN (HCC): Primary | ICD-10-CM

## 2021-04-08 DIAGNOSIS — E03.8 HYPOTHYROIDISM DUE TO HASHIMOTO'S THYROIDITIS: ICD-10-CM

## 2021-04-08 DIAGNOSIS — Z78.0 MENOPAUSE PRESENT: ICD-10-CM

## 2021-04-08 PROCEDURE — 99214 OFFICE O/P EST MOD 30 MIN: CPT | Performed by: INTERNAL MEDICINE

## 2021-04-08 RX ORDER — ESTRADIOL 0.5 MG/1
0.5 TABLET ORAL DAILY
Qty: 30 TABLET | Refills: 11 | Status: SHIPPED | OUTPATIENT
Start: 2021-04-08 | End: 2022-04-20

## 2021-04-08 RX ORDER — METFORMIN HYDROCHLORIDE 500 MG/1
1000 TABLET, EXTENDED RELEASE ORAL 2 TIMES DAILY
Qty: 120 TABLET | Refills: 11 | Status: SHIPPED | OUTPATIENT
Start: 2021-04-08 | End: 2022-04-20

## 2021-04-08 NOTE — ASSESSMENT & PLAN NOTE
Patient is a 70-year-old female who has menopausal symptoms  Patient notes that she has been on Estratest for several years and notes that symptoms are significantly improved  Patient also notes that when she stopped Estratest she did not do well  However secondary to recommendations from the endocrine Society I do not currently recommend treating patients with testosterone therapy  Patient understands this discussion  Patient is status post total abdominal hysterectomy  We will initiate Estrace 0.5 mg 1 tab p.o. daily for the patient to start once her Estratest is completed.  Patient was advised that Estrace and Estratest cannot be taken together.

## 2021-04-08 NOTE — PROGRESS NOTES
"Chief Complaint  Diabetes (testing bs prn / last dm eye exam 6/16/20), Hyperlipidemia, Hypertension, and Hypothyroidism    Subjective          Jose Keys presents to [facility]  71 yo F with PMH of menopause, diabetes mellitus type 2, vaginal atrophy, and vitamin D deficiency    Menopause Therapy  Mrs. Keys notes that she has been on estretest since 2016. Notes that she did a trial off estratest 3-4 years ago and was symptomatic off of the estrogen and testosterone therapy, for aout 3 months. She notes that she had a hysterectomy in 1996. She notes that she was having symptoms of vaginal driness and decreased libdo, and testo was added with Dr. Denney. You notes that she was without her estretest for 10 day.  She notes that she is currently taking Estratest and doing well.    Hyperlipidemia  Patient notes difficulty tolerating statins in the past.  She also notes an allergic reaction to Praluent that was consistent with anaphylactic symptoms.  Most recent LDL was 164.    Diabetes  Currently taking metformin 1000 mg po BID. She notes that she checks her BG sporadically. She notes that she has occasional low  BG occasionally kendal than 70. Most recent hgb A1c was 6.3.  Last eye exam was 8/2020. Notes that she had cataracts removed. Does have neuropathy in her feet. She notes that her clothing is fitting differently though the scale shows no significant changes. She walks 15-20 min everyday.     Hypothyroidism  She was diagnosed with hypothyroidism about 20 years ago. She takes it first thing in the morning on an empty stomach wait 30min-1hr. She notes dry skin and increased centripetal girth.      Objective   Vital Signs:   /70 (BP Location: Left arm, Patient Position: Sitting, Cuff Size: Large Adult)   Pulse 66   Ht 165.1 cm (65\")   Wt 92.2 kg (203 lb 3.2 oz)   SpO2 98%   BMI 33.81 kg/m²     Physical Exam  Vitals reviewed.   Constitutional:       Appearance: Normal " appearance. She is normal weight.   HENT:      Mouth/Throat:      Mouth: Mucous membranes are moist.   Eyes:      Extraocular Movements: Extraocular movements intact.      Conjunctiva/sclera: Conjunctivae normal.      Pupils: Pupils are equal, round, and reactive to light.   Cardiovascular:      Rate and Rhythm: Normal rate and regular rhythm.      Pulses: Normal pulses.           Dorsalis pedis pulses are 2+ on the right side and 2+ on the left side.      Heart sounds: Normal heart sounds.   Abdominal:      General: Abdomen is flat. Bowel sounds are normal.      Palpations: Abdomen is soft.   Feet:      Right foot:      Protective Sensation: 10 sites tested. 10 sites sensed.      Skin integrity: Skin integrity normal.      Toenail Condition: Right toenails are normal.      Left foot:      Protective Sensation: 10 sites tested. 10 sites sensed.      Skin integrity: Skin integrity normal.      Toenail Condition: Left toenails are normal.   Skin:     General: Skin is warm and dry.   Neurological:      General: No focal deficit present.      Mental Status: She is oriented to person, place, and time.      Cranial Nerves: Cranial nerves are intact.      Sensory: Sensation is intact.      Deep Tendon Reflexes: Reflexes are normal and symmetric.   Psychiatric:         Mood and Affect: Mood normal.         Behavior: Behavior normal.        Result Review :   The following data was reviewed by: Rajinder Nguyen MD on 04/08/2021:  CMP    CMP 5/12/20 10/26/20   Glucose 102 (A) 93   BUN 11 11   Creatinine 0.67 0.73   eGFR Non  Am 87 79   eGFR African Am 106 96   Sodium 145 141   Potassium 4.7 5.0   Chloride 105 105   Calcium 9.3 8.7   Total Protein 7.4 6.9   Albumin 4.80 4.50   Globulin 2.6 2.4   Total Bilirubin 0.3 0.2   Alkaline Phosphatase 67 75   AST (SGOT) 16 13   ALT (SGPT) 29 33   (A) Abnormal value              Lipid Panel    Lipid Panel 5/12/20 10/26/20   Total Cholesterol 252 (A) 236 (A)   Triglycerides 169 (A)  185 (A)   HDL Cholesterol 48 38 (A)   VLDL Cholesterol 33.8 34   LDL Cholesterol  170 (A) 164 (A)   (A) Abnormal value            TSH    TSH 5/12/20 10/26/20   TSH 1.300 1.590           Electrolytes    Electrolytes 5/12/20 10/26/20   Sodium 145 141   Potassium 4.7 5.0   Chloride 105 105   Calcium 9.3 8.7           Renal Profile    Renal Profile 5/12/20 10/26/20   BUN 11 11   Creatinine 0.67 0.73   eGFR Non  Am 87 79   eGFR  Am 106 96           A1C Last 3 Results    HGBA1C Last 3 Results 5/12/20 10/26/20   Hemoglobin A1C 6.10 (A) 6.30 (A)   (A) Abnormal value       Comments are available for some flowsheets but are not being displayed.           Microalbumin    Microalbumin 5/12/20 10/26/20   Microalbumin, Urine 40.2 18.1                   Data reviewed: CMP, hemoglobin A1c, lipid panel, thyroid function test          Assessment and Plan    Diagnoses and all orders for this visit:    1. Controlled type 2 diabetes mellitus with complication, without long-term current use of insulin (CMS/[facility]) (Primary)  Assessment & Plan:  Goal Hgb A1c <7  Goal fasting BG <130 mg/dl  Goal BG prior to meal <180  Most recent hgb A1c was   Hemoglobin A1C   Date Value Ref Range Status   10/26/2020 6.30 (H) 4.80 - 5.60 % Final     Comment:     Hemoglobin A1C Ranges:  Increased Risk for Diabetes  5.7% to 6.4%  Diabetes                     >= 6.5%  Diabetic Goal                < 7.0%        Hemoglobin A1c at goal  We will check labs today  Patient is currently taking Metformin 1000 mg twice daily  Patient notes some possible GI complications  We will change Metformin to extended release 500 mg 2 tabs p.o. twice daily  Encourage low carb diet with no more than 45 gm of cab per meal      Orders:  -     Comprehensive Metabolic Panel  -     eGFR-Glomerular Filtration  -     Hemoglobin A1c  -     Lipid Panel  -     Microalbumin / Creatinine Urine Ratio - Urine, Clean Catch  -     Vitamin B12 & Folate  -     TSH  -     T4, Free  -      T3    2. Hypothyroidism due to Hashimoto's thyroiditis  Assessment & Plan:  Goal TSH <2.5  Last TSH/FT4 :   TSH   Date Value Ref Range Status   10/26/2020 1.590 0.450 - 4.500 uIU/mL Final     Free T4   Date Value Ref Range Status   10/26/2020 1.42 0.93 - 1.70 ng/dL Final     Comment:     Results may be falsely increased if patient taking Biotin.   .  Last Ft3:   T3, Free   Date Value Ref Range Status   10/26/2020 2.7 2.0 - 4.4 pg/mL Final   .  Last TPO:   Thyroid Peroxidase Antibody   Date Value Ref Range Status   04/29/2016 19 0 - 34 IU/mL Final   Last TSH is at goal  We will check labs today  Patient is currently taking Synthroid 125 mcg 1 tab p.o. daily        3. Mixed hyperlipidemia  Assessment & Plan:  Last documented LDL was above goal  Patient notes that she cannot tolerate statins and had an allergic reaction to PCSK9 inhibitors  We will consider the initiation of bempedoic acid to improve lipid levels  We will check lipid levels today      4. Menopause present  Assessment & Plan:  Patient is a 70-year-old female who has menopausal symptoms  Patient notes that she has been on Estratest for several years and notes that symptoms are significantly improved  Patient also notes that when she stopped Estratest she did not do well  However secondary to recommendations from the endocrine Society I do not currently recommend treating patients with testosterone therapy  Patient understands this discussion  Patient is status post total abdominal hysterectomy  We will initiate Estrace 0.5 mg 1 tab p.o. daily for the patient to start once her Estratest is completed.  Patient was advised that Estrace and Estratest cannot be taken together.      Other orders  -     metFORMIN ER (Glucophage XR) 500 MG 24 hr tablet; Take 2 tablets by mouth 2 (two) times a day.  Dispense: 120 tablet; Refill: 11  -     estradiol (ESTRACE) 0.5 MG tablet; Take 1 tablet by mouth Daily.  Dispense: 30 tablet; Refill: 11      Follow Up   Return in  about 3 months (around 7/8/2021).  Patient was given instructions and counseling regarding her condition or for health maintenance advice. Please see specific information pulled into the AVS if appropriate.

## 2021-04-08 NOTE — ASSESSMENT & PLAN NOTE
Last documented LDL was above goal  Patient notes that she cannot tolerate statins and had an allergic reaction to PCSK9 inhibitors  We will consider the initiation of bempedoic acid to improve lipid levels  We will check lipid levels today

## 2021-04-08 NOTE — ASSESSMENT & PLAN NOTE
Goal Hgb A1c <7  Goal fasting BG <130 mg/dl  Goal BG prior to meal <180  Most recent hgb A1c was   Hemoglobin A1C   Date Value Ref Range Status   10/26/2020 6.30 (H) 4.80 - 5.60 % Final     Comment:     Hemoglobin A1C Ranges:  Increased Risk for Diabetes  5.7% to 6.4%  Diabetes                     >= 6.5%  Diabetic Goal                < 7.0%        Hemoglobin A1c at goal  We will check labs today  Patient is currently taking Metformin 1000 mg twice daily  Patient notes some possible GI complications  We will change Metformin to extended release 500 mg 2 tabs p.o. twice daily  Encourage low carb diet with no more than 45 gm of cab per meal

## 2021-04-08 NOTE — ASSESSMENT & PLAN NOTE
Goal TSH <2.5  Last TSH/FT4 :   TSH   Date Value Ref Range Status   10/26/2020 1.590 0.450 - 4.500 uIU/mL Final     Free T4   Date Value Ref Range Status   10/26/2020 1.42 0.93 - 1.70 ng/dL Final     Comment:     Results may be falsely increased if patient taking Biotin.   .  Last Ft3:   T3, Free   Date Value Ref Range Status   10/26/2020 2.7 2.0 - 4.4 pg/mL Final   .  Last TPO:   Thyroid Peroxidase Antibody   Date Value Ref Range Status   04/29/2016 19 0 - 34 IU/mL Final   Last TSH is at goal  We will check labs today  Patient is currently taking Synthroid 125 mcg 1 tab p.o. daily

## 2021-04-09 LAB
ALBUMIN SERPL-MCNC: 4.6 G/DL (ref 3.5–5.2)
ALBUMIN/CREAT UR: 11 MG/G CREAT (ref 0–29)
ALBUMIN/GLOB SERPL: 1.7 G/DL
ALP SERPL-CCNC: 75 U/L (ref 39–117)
ALT SERPL-CCNC: 40 U/L (ref 1–33)
AST SERPL-CCNC: 22 U/L (ref 1–32)
BILIRUB SERPL-MCNC: 0.3 MG/DL (ref 0–1.2)
BUN SERPL-MCNC: 14 MG/DL (ref 8–23)
BUN/CREAT SERPL: 19.7 (ref 7–25)
CALCIUM SERPL-MCNC: 9.5 MG/DL (ref 8.6–10.5)
CHLORIDE SERPL-SCNC: 106 MMOL/L (ref 98–107)
CHOLEST SERPL-MCNC: 230 MG/DL (ref 0–200)
CO2 SERPL-SCNC: 27.8 MMOL/L (ref 22–29)
CREAT SERPL-MCNC: 0.71 MG/DL (ref 0.57–1)
CREAT UR-MCNC: 208.5 MG/DL
FOLATE SERPL-MCNC: 8.04 NG/ML (ref 4.78–24.2)
GLOBULIN SER CALC-MCNC: 2.7 GM/DL
GLUCOSE SERPL-MCNC: 100 MG/DL (ref 65–99)
HBA1C MFR BLD: 6.4 % (ref 4.8–5.6)
HDLC SERPL-MCNC: 40 MG/DL (ref 40–60)
IMP & REVIEW OF LAB RESULTS: NORMAL
LDLC SERPL CALC-MCNC: 142 MG/DL (ref 0–100)
Lab: NORMAL
MICROALBUMIN UR-MCNC: 22.5 UG/ML
POTASSIUM SERPL-SCNC: 4.7 MMOL/L (ref 3.5–5.2)
PROT SERPL-MCNC: 7.3 G/DL (ref 6–8.5)
SODIUM SERPL-SCNC: 144 MMOL/L (ref 136–145)
T3 SERPL-MCNC: 107 NG/DL (ref 80–200)
T4 FREE SERPL-MCNC: 1.65 NG/DL (ref 0.93–1.7)
TRIGL SERPL-MCNC: 262 MG/DL (ref 0–150)
TSH SERPL DL<=0.005 MIU/L-ACNC: 0.44 UIU/ML (ref 0.27–4.2)
VIT B12 SERPL-MCNC: 1449 PG/ML (ref 211–946)
VLDLC SERPL CALC-MCNC: 48 MG/DL (ref 5–40)

## 2021-04-09 NOTE — PROGRESS NOTES
Good evening  Your hemoglobin A1c is 6.4.  Please continue to minimize her carbohydrates as previously discussed  Please continue to be as active as tolerated with at least 150 minutes/week of moderate activity.     Your LDL, bad cholesterol, is 142-goal is less than 100  Your triglycerides are 262-goal is less than 150  There is no evidence of protein in your urine  Your vitamin B12 is in a good range  Your thyroid function tests are normal  You have a mild elevation in one of your liver enzymes  I suggest that we monitor this by repeating blood work in the next 6 months.

## 2021-04-16 ENCOUNTER — TELEPHONE (OUTPATIENT)
Dept: ENDOCRINOLOGY | Age: 71
End: 2021-04-16

## 2021-04-20 ENCOUNTER — TELEPHONE (OUTPATIENT)
Dept: FAMILY MEDICINE CLINIC | Facility: CLINIC | Age: 71
End: 2021-04-20

## 2021-04-20 DIAGNOSIS — Z78.0 POSTMENOPAUSAL: Primary | ICD-10-CM

## 2021-04-21 ENCOUNTER — TELEPHONE (OUTPATIENT)
Dept: ENDOCRINOLOGY | Age: 71
End: 2021-04-21

## 2021-05-15 RX ORDER — ERGOCALCIFEROL 1.25 MG/1
CAPSULE ORAL
Qty: 24 CAPSULE | Refills: 1 | Status: SHIPPED | OUTPATIENT
Start: 2021-05-15 | End: 2021-07-13 | Stop reason: SDUPTHER

## 2021-05-15 RX ORDER — LEVOTHYROXINE SODIUM 125 MCG
125 TABLET ORAL DAILY
Qty: 90 TABLET | Refills: 1 | Status: SHIPPED | OUTPATIENT
Start: 2021-05-15 | End: 2021-11-09 | Stop reason: SDUPTHER

## 2021-05-17 ENCOUNTER — TELEPHONE (OUTPATIENT)
Dept: ENDOCRINOLOGY | Age: 71
End: 2021-05-17

## 2021-05-17 NOTE — TELEPHONE ENCOUNTER
Patient out of synthroid was under sandi murguia please also send in new script for estrogene as well to [facility]

## 2021-05-26 RX ORDER — ESTERIFIED ESTROGEN AND METHYLTESTOSTERONE .625; 1.25 MG/1; MG/1
1 TABLET ORAL DAILY
Qty: 30 TABLET | Refills: 5 | OUTPATIENT
Start: 2021-05-26

## 2021-06-18 DIAGNOSIS — I10 ESSENTIAL HYPERTENSION: ICD-10-CM

## 2021-06-18 RX ORDER — AMLODIPINE AND VALSARTAN 10; 320 MG/1; MG/1
1 TABLET ORAL DAILY
Qty: 90 TABLET | Refills: 0 | OUTPATIENT
Start: 2021-06-18

## 2021-06-18 NOTE — TELEPHONE ENCOUNTER
Should come from primary care physician, please let the patient know. Okay to do 30-day prescription if patient is running out of the medication.

## 2021-06-18 NOTE — TELEPHONE ENCOUNTER
Estrogen    Amlodipine 10/320mg    Send to   [id number] - [place], [place] - [address] AT [facility] [address] - [phone]  - [phone] FX Phone:  [phone]   Fax:  [phone]            90 day supply    Patient is out    Please call if isak lemus   Will see naresh

## 2021-06-22 DIAGNOSIS — I10 ESSENTIAL HYPERTENSION: ICD-10-CM

## 2021-06-22 RX ORDER — AMLODIPINE AND VALSARTAN 10; 320 MG/1; MG/1
1 TABLET ORAL DAILY
Qty: 90 TABLET | Refills: 1 | Status: CANCELLED | OUTPATIENT
Start: 2021-06-22

## 2021-06-23 DIAGNOSIS — I10 ESSENTIAL HYPERTENSION: ICD-10-CM

## 2021-06-23 RX ORDER — AMLODIPINE AND VALSARTAN 10; 320 MG/1; MG/1
1 TABLET ORAL DAILY
Qty: 90 TABLET | Refills: 1 | Status: CANCELLED | OUTPATIENT
Start: 2021-06-23

## 2021-06-23 NOTE — TELEPHONE ENCOUNTER
----- Message from Jose Keys sent at 6/22/2021  5:31 PM EDT -----  Regarding: Prescription Question  Contact: [phone]  Before dr. Nguyen left she was to send a new prescription for estrogen and a refill for amlodopine/valsartin. I have tried several times to get these medications filled, with no success. Please help with this problem. I have an appointment scheduled with SHASHI Doty but not until December 20.  This was the first available appointment.

## 2021-06-25 DIAGNOSIS — I10 ESSENTIAL HYPERTENSION: ICD-10-CM

## 2021-06-28 RX ORDER — AMLODIPINE AND VALSARTAN 10; 320 MG/1; MG/1
1 TABLET ORAL DAILY
Qty: 30 TABLET | Refills: 0 | OUTPATIENT
Start: 2021-06-28

## 2021-07-09 RX ORDER — ESTERIFIED ESTROGEN AND METHYLTESTOSTERONE .625; 1.25 MG/1; MG/1
1 TABLET ORAL DAILY
Qty: 30 TABLET | Refills: 5 | OUTPATIENT
Start: 2021-07-09

## 2021-07-12 NOTE — PROGRESS NOTES
Subjective   Jose Keys is a 71 y.o. female.     History of Present Illness     Chief Complaint:   Chief Complaint   Patient presents with   • Hypertension     med refill  - [facility] pharm    • Gout   • medicare wellness     due       Jose Keys 71 y.o. female who presents today for Medical Management of the below listed issues and medication refills.  she has a problem list of   Patient Active Problem List   Diagnosis   • Hyperuricemia   • Diabetic peripheral neuropathy (CMS/HCC)   • Fatigue   • Hyperlipidemia   • Hypertension   • Hypothyroidism   • Menopause present   • Proteinuria   • Uncontrolled type 2 diabetes mellitus (CMS/HCC)   • Vitamin D deficiency   • Anxiety   • Type 2 diabetes mellitus (CMS/HCC)   • LFT elevation   • Neuropathy   • Controlled type 2 diabetes mellitus (CMS/HCC)   • Vaginal atrophy   • Gout   .  Since the last visit, she has overall felt well.  she has been compliant with   Current Outpatient Medications:   •  ACCU-CHEK FASTCLIX LANCETS misc, Use to test BG 2x daily. DX CODE: E11.65, Disp: 200 each, Rfl: 1  •  allopurinol (ZYLOPRIM) 100 MG tablet, Take 1 tablet by mouth Daily., Disp: 90 tablet, Rfl: 3  •  Alpha-Lipoic Acid 600 MG capsule, Take  by mouth., Disp: , Rfl:   •  amLODIPine-valsartan (Exforge)  MG per tablet, Take 1 tablet by mouth Daily., Disp: 90 tablet, Rfl: 3  •  ASPIRIN LOW DOSE 81 MG EC tablet, Take 81 mg by mouth Daily., Disp: , Rfl: 11  •  Biotin 5000 MCG capsule, Take  by mouth. 1 daily, Disp: , Rfl:   •  buPROPion XL (WELLBUTRIN XL) 300 MG 24 hr tablet, Take  by mouth Daily., Disp: , Rfl: 2  •  carvedilol (COREG) 3.125 MG tablet, Take 1 tablet by mouth 2 (Two) Times a Day With Meals., Disp: 180 tablet, Rfl: 1  •  cetirizine (zyrTEC) 5 MG tablet, Take 5 mg by mouth., Disp: , Rfl:   •  escitalopram (LEXAPRO) 10 MG tablet, Take  by mouth Daily., Disp: , Rfl: 2  •  estradiol (ESTRACE) 0.5 MG tablet, Take 1 tablet by mouth Daily., Disp: 30 tablet, Rfl: 11  •   "gabapentin (NEURONTIN) 300 MG capsule, Take 1 capsule by mouth 2 (two) times a day., Disp: 180 capsule, Rfl: 1  •  glucose blood (ACCU-CHEK SMARTVIEW) test strip, Use to test BG 2x daily, Disp: 200 each, Rfl: 1  •  Lancet Devices (ACCU-CHEK SOFTCLIX) lancets, Use as instructed, Disp: 1 each, Rfl: 0  •  metFORMIN ER (Glucophage XR) 500 MG 24 hr tablet, Take 2 tablets by mouth 2 (two) times a day., Disp: 120 tablet, Rfl: 11  •  montelukast (SINGULAIR) 10 MG tablet, Take 10 mg by mouth Daily., Disp: , Rfl: 0  •  Multiple Vitamins-Minerals (MULTIVITAMIN PO), Take 1 tablet by mouth daily., Disp: , Rfl:   •  Multiple Vitamins-Minerals (OCUVITE PO), Take 1 tablet by mouth daily., Disp: , Rfl:   •  Synthroid 125 MCG tablet, Take 1 tablet by mouth Daily., Disp: 90 tablet, Rfl: 1  •  vitamin D (ERGOCALCIFEROL) 1.25 MG (01153 UT) capsule capsule, Take 1 cap twice weekly, Disp: 24 capsule, Rfl: 3.  she denies medication side effects.    All of the other chronic condition(s) listed above are stable w/o issues.    /80 Comment: out of bp meds x  1 month  Pulse 58   Temp 97.6 °F (36.4 °C) (Oral)   Resp 14   Ht 165.1 cm (65\")   Wt 89.4 kg (197 lb)   BMI 32.78 kg/m²     Results for orders placed or performed in visit on 04/08/21   Comprehensive Metabolic Panel    Specimen: Blood   Result Value Ref Range    Glucose 100 (H) 65 - 99 mg/dL    BUN 14 8 - 23 mg/dL    Creatinine 0.71 0.57 - 1.00 mg/dL    eGFR Non African Am 82 >60 mL/min/1.73    eGFR African Am 99 >60 mL/min/1.73    BUN/Creatinine Ratio 19.7 7.0 - 25.0    Sodium 144 136 - 145 mmol/L    Potassium 4.7 3.5 - 5.2 mmol/L    Chloride 106 98 - 107 mmol/L    Total CO2 27.8 22.0 - 29.0 mmol/L    Calcium 9.5 8.6 - 10.5 mg/dL    Total Protein 7.3 6.0 - 8.5 g/dL    Albumin 4.60 3.50 - 5.20 g/dL    Globulin 2.7 gm/dL    A/G Ratio 1.7 g/dL    Total Bilirubin 0.3 0.0 - 1.2 mg/dL    Alkaline Phosphatase 75 39 - 117 U/L    AST (SGOT) 22 1 - 32 U/L    ALT (SGPT) 40 (H) 1 - 33 U/L "   Hemoglobin A1c    Specimen: Blood   Result Value Ref Range    Hemoglobin A1C 6.40 (H) 4.80 - 5.60 %   Lipid Panel    Specimen: Blood   Result Value Ref Range    Total Cholesterol 230 (H) 0 - 200 mg/dL    Triglycerides 262 (H) 0 - 150 mg/dL    HDL Cholesterol 40 40 - 60 mg/dL    VLDL Cholesterol Kyler 48 (H) 5 - 40 mg/dL    LDL Chol Calc (NIH) 142 (H) 0 - 100 mg/dL   Microalbumin / Creatinine Urine Ratio - Urine, Clean Catch    Specimen: Urine, Clean Catch   Result Value Ref Range    Creatinine, Urine 208.5 Not Estab. mg/dL    Microalbumin, Urine 22.5 Not Estab. ug/mL    Microalbumin/Creatinine Ratio 11 0 - 29 mg/g creat   Vitamin B12 & Folate    Specimen: Blood   Result Value Ref Range    Vitamin B-12 1,449 (H) 211 - 946 pg/mL    Folate 8.04 4.78 - 24.20 ng/mL   TSH    Specimen: Blood   Result Value Ref Range    TSH 0.440 0.270 - 4.200 uIU/mL   T4, Free    Specimen: Blood   Result Value Ref Range    Free T4 1.65 0.93 - 1.70 ng/dL   T3    Specimen: Blood   Result Value Ref Range    T3, Total 107.0 80.0 - 200.0 ng/dl   Cardiovascular Risk Assessment   Result Value Ref Range    Interpretation Note    Diabetes Patient Education   Result Value Ref Range    PDF Image Not applicable            The following portions of the patient's history were reviewed and updated as appropriate: allergies, current medications, past family history, past medical history, past social history, past surgical history and problem list.    Review of Systems   Constitutional: Negative for activity change, chills and fever.   Respiratory: Negative for cough.    Cardiovascular: Negative for chest pain.   Psychiatric/Behavioral: Negative for dysphoric mood.       Objective   Physical Exam  Constitutional:       General: She is not in acute distress.     Appearance: She is well-developed.   Cardiovascular:      Rate and Rhythm: Normal rate and regular rhythm.   Pulmonary:      Effort: Pulmonary effort is normal.      Breath sounds: Normal breath  sounds.   Neurological:      Mental Status: She is alert and oriented to person, place, and time.   Psychiatric:         Behavior: Behavior normal.         Thought Content: Thought content normal.         Assessment/Plan   Diagnoses and all orders for this visit:    1. Medicare annual wellness visit, subsequent (Primary)    2. Essential hypertension  -     amLODIPine-valsartan (Exforge)  MG per tablet; Take 1 tablet by mouth Daily.  Dispense: 90 tablet; Refill: 3    3. Gout, unspecified cause, unspecified chronicity, unspecified site  -     allopurinol (ZYLOPRIM) 100 MG tablet; Take 1 tablet by mouth Daily.  Dispense: 90 tablet; Refill: 3    4. Vitamin D deficiency  -     vitamin D (ERGOCALCIFEROL) 1.25 MG (19168 UT) capsule capsule; Take 1 cap twice weekly  Dispense: 24 capsule; Refill: 3    5. Diabetic peripheral neuropathy (CMS/HCC)  -     gabapentin (NEURONTIN) 300 MG capsule; Take 1 capsule by mouth 2 (two) times a day.  Dispense: 180 capsule; Refill: 1

## 2021-07-13 ENCOUNTER — OFFICE VISIT (OUTPATIENT)
Dept: FAMILY MEDICINE CLINIC | Facility: CLINIC | Age: 71
End: 2021-07-13

## 2021-07-13 VITALS
HEIGHT: 65 IN | BODY MASS INDEX: 32.82 KG/M2 | TEMPERATURE: 97.6 F | RESPIRATION RATE: 14 BRPM | HEART RATE: 58 BPM | WEIGHT: 197 LBS | DIASTOLIC BLOOD PRESSURE: 80 MMHG | SYSTOLIC BLOOD PRESSURE: 162 MMHG

## 2021-07-13 DIAGNOSIS — I10 ESSENTIAL HYPERTENSION: Chronic | ICD-10-CM

## 2021-07-13 DIAGNOSIS — E55.9 VITAMIN D DEFICIENCY: Chronic | ICD-10-CM

## 2021-07-13 DIAGNOSIS — E11.42 DIABETIC PERIPHERAL NEUROPATHY (HCC): Chronic | ICD-10-CM

## 2021-07-13 DIAGNOSIS — Z00.00 MEDICARE ANNUAL WELLNESS VISIT, SUBSEQUENT: Primary | ICD-10-CM

## 2021-07-13 DIAGNOSIS — M10.9 GOUT, UNSPECIFIED CAUSE, UNSPECIFIED CHRONICITY, UNSPECIFIED SITE: Chronic | ICD-10-CM

## 2021-07-13 PROCEDURE — 99214 OFFICE O/P EST MOD 30 MIN: CPT | Performed by: FAMILY MEDICINE

## 2021-07-13 PROCEDURE — G0439 PPPS, SUBSEQ VISIT: HCPCS | Performed by: FAMILY MEDICINE

## 2021-07-13 RX ORDER — AMLODIPINE AND VALSARTAN 10; 320 MG/1; MG/1
1 TABLET ORAL DAILY
Qty: 90 TABLET | Refills: 3 | Status: SHIPPED | OUTPATIENT
Start: 2021-07-13 | End: 2022-07-15

## 2021-07-13 RX ORDER — ERGOCALCIFEROL 1.25 MG/1
CAPSULE ORAL
Qty: 24 CAPSULE | Refills: 3 | Status: SHIPPED | OUTPATIENT
Start: 2021-07-13 | End: 2021-09-03 | Stop reason: SDUPTHER

## 2021-07-13 RX ORDER — GABAPENTIN 300 MG/1
300 CAPSULE ORAL 2 TIMES DAILY
Qty: 180 CAPSULE | Refills: 1 | Status: SHIPPED | OUTPATIENT
Start: 2021-07-13 | End: 2022-08-30 | Stop reason: SDUPTHER

## 2021-07-13 RX ORDER — ALLOPURINOL 100 MG/1
100 TABLET ORAL DAILY
Qty: 90 TABLET | Refills: 3 | Status: SHIPPED | OUTPATIENT
Start: 2021-07-13 | End: 2022-07-13

## 2021-07-13 NOTE — PROGRESS NOTES
The ABCs of the Annual Wellness Visit  Subsequent Medicare Wellness Visit    Chief Complaint   Patient presents with   • Hypertension     med refill  - [facility] pharm    • Gout   • medicare wellness     due       Subjective   History of Present Illness:  Jose Keys is a 71 y.o. female who presents for a Subsequent Medicare Wellness Visit.    HEALTH RISK ASSESSMENT    Recent Hospitalizations:  No hospitalization(s) within the last year.    Current Medical Providers:  Patient Care Team:  James Lau MD as PCP - General  Adelita Nguyen MD as Consulting Physician (Endocrinology)  Kammerling, James M, MD as Consulting Physician (Cardiac Electrophysiology)    Smoking Status:  Social History     Tobacco Use   Smoking Status Never Smoker   Smokeless Tobacco Never Used       Alcohol Consumption:  Social History     Substance and Sexual Activity   Alcohol Use Yes    Comment: socially        Depression Screen:   PHQ-2/PHQ-9 Depression Screening 7/13/2021   Little interest or pleasure in doing things 0   Feeling down, depressed, or hopeless 0   Trouble falling or staying asleep, or sleeping too much -   Feeling tired or having little energy -   Poor appetite or overeating -   Feeling bad about yourself - or that you are a failure or have let yourself or your family down -   Trouble concentrating on things, such as reading the newspaper or watching television -   Moving or speaking so slowly that other people could have noticed. Or the opposite - being so fidgety or restless that you have been moving around a lot more than usual -   Thoughts that you would be better off dead, or of hurting yourself in some way -   Total Score 0       Fall Risk Screen:  STEADI Fall Risk Assessment has not been completed.    Health Habits and Functional and Cognitive Screening:  Functional & Cognitive Status 7/13/2021   Do you have difficulty preparing food and eating? No   Do you have difficulty bathing yourself, getting dressed or  grooming yourself? No   Do you have difficulty using the toilet? No   Do you have difficulty moving around from place to place? No   Do you have trouble with steps or getting out of a bed or a chair? No   Current Diet Well Balanced Diet   Dental Exam Up to date   Eye Exam Up to date   Exercise (times per week) 3 times per week   Current Exercises Include Walking   Current Exercise Activities Include -   Do you need help using the phone?  No   Are you deaf or do you have serious difficulty hearing?  Yes   Do you need help with transportation? No   Do you need help shopping? No   Do you need help preparing meals?  No   Do you need help with housework?  No   Do you need help with laundry? No   Do you need help taking your medications? No   Do you need help managing money? No   Do you ever drive or ride in a car without wearing a seat belt? No   Have you felt unusual stress, anger or loneliness in the last month? No   Who do you live with? Spouse   If you need help, do you have trouble finding someone available to you? Yes   Have you been bothered in the last four weeks by sexual problems? No   Do you have difficulty concentrating, remembering or making decisions? Yes         Does the patient have evidence of cognitive impairment? No    Asprin use counseling:Does not need ASA (and currently is not on it)    Age-appropriate Screening Schedule:  Refer to the list below for future screening recommendations based on patient's age, sex and/or medical conditions. Orders for these recommended tests are listed in the plan section. The patient has been provided with a written plan.    Health Maintenance   Topic Date Due   • TDAP/TD VACCINES (1 - Tdap) Never done   • DIABETIC FOOT EXAM  Never done   • PAP SMEAR  Never done   • DXA SCAN  07/11/2020   • INFLUENZA VACCINE  08/01/2021   • HEMOGLOBIN A1C  10/08/2021   • MAMMOGRAM  10/20/2021   • LIPID PANEL  04/08/2022   • URINE MICROALBUMIN  04/08/2022   • DIABETIC EYE EXAM   04/26/2022   • ZOSTER VACCINE  Discontinued          The following portions of the patient's history were reviewed and updated as appropriate: allergies, current medications, past family history, past medical history, past social history, past surgical history and problem list.    Outpatient Medications Prior to Visit   Medication Sig Dispense Refill   • ACCU-CHEK FASTCLIX LANCETS misc Use to test BG 2x daily. DX CODE: E11.65 200 each 1   • Alpha-Lipoic Acid 600 MG capsule Take  by mouth.     • ASPIRIN LOW DOSE 81 MG EC tablet Take 81 mg by mouth Daily.  11   • Biotin 5000 MCG capsule Take  by mouth. 1 daily     • buPROPion XL (WELLBUTRIN XL) 300 MG 24 hr tablet Take  by mouth Daily.  2   • carvedilol (COREG) 3.125 MG tablet Take 1 tablet by mouth 2 (Two) Times a Day With Meals. 180 tablet 1   • cetirizine (zyrTEC) 5 MG tablet Take 5 mg by mouth.     • escitalopram (LEXAPRO) 10 MG tablet Take  by mouth Daily.  2   • estradiol (ESTRACE) 0.5 MG tablet Take 1 tablet by mouth Daily. 30 tablet 11   • glucose blood (ACCU-CHEK SMARTVIEW) test strip Use to test BG 2x daily 200 each 1   • Lancet Devices (ACCU-CHEK SOFTCLIX) lancets Use as instructed 1 each 0   • metFORMIN ER (Glucophage XR) 500 MG 24 hr tablet Take 2 tablets by mouth 2 (two) times a day. 120 tablet 11   • montelukast (SINGULAIR) 10 MG tablet Take 10 mg by mouth Daily.  0   • Multiple Vitamins-Minerals (MULTIVITAMIN PO) Take 1 tablet by mouth daily.     • Multiple Vitamins-Minerals (OCUVITE PO) Take 1 tablet by mouth daily.     • Synthroid 125 MCG tablet Take 1 tablet by mouth Daily. 90 tablet 1   • allopurinol (ZYLOPRIM) 100 MG tablet Take 1 tablet by mouth Daily. 90 tablet 1   • amLODIPine-valsartan (Exforge)  MG per tablet Take 1 tablet by mouth Daily. 90 tablet 1   • estrogens, conjugated,-methyltestosterone (Est Estrogens-Methyltest HS) 0.625-1.25 MG per tablet Take 1 tablet by mouth Daily. 30 tablet 5   • gabapentin (NEURONTIN) 300 MG capsule Take  "1 capsule by mouth 4 (Four) Times a Day. 360 capsule 0   • vitamin D (ERGOCALCIFEROL) 1.25 MG (68725 UT) capsule capsule Take 1 cap twice weekly 24 capsule 1     No facility-administered medications prior to visit.       Patient Active Problem List   Diagnosis   • Hyperuricemia   • Diabetic peripheral neuropathy (CMS/HCC)   • Fatigue   • Hyperlipidemia   • Hypertension   • Hypothyroidism   • Menopause present   • Proteinuria   • Uncontrolled type 2 diabetes mellitus (CMS/HCC)   • Vitamin D deficiency   • Anxiety   • Type 2 diabetes mellitus (CMS/HCC)   • LFT elevation   • Neuropathy   • Controlled type 2 diabetes mellitus (CMS/HCC)   • Vaginal atrophy   • Gout       Advanced Care Planning:  ACP discussion was held with the patient during this visit. Patient does not have an advance directive, information provided.    Review of Systems    Compared to one year ago, the patient feels her physical health is the same.  Compared to one year ago, the patient feels her mental health is the same.    Reviewed chart for potential of high risk medication in the elderly: yes  Reviewed chart for potential of harmful drug interactions in the elderly:yes    Objective         Vitals:    07/13/21 1123   BP: 162/80  Comment: out of bp meds x  1 month   Pulse: 58   Resp: 14   Temp: 97.6 °F (36.4 °C)   TempSrc: Oral   Weight: 89.4 kg (197 lb)   Height: 165.1 cm (65\")   PainSc: 0-No pain       Body mass index is 32.78 kg/m².  Discussed the patient's BMI with her. The BMI is above average; no BMI management plan is appropriate..    Physical Exam          Assessment/Plan   Medicare Risks and Personalized Health Plan  CMS Preventative Services Quick Reference  Cardiovascular risk    The above risks/problems have been discussed with the patient.  Pertinent information has been shared with the patient in the After Visit Summary.  Follow up plans and orders are seen below in the Assessment/Plan Section.    Diagnoses and all orders for this " visit:    1. Medicare annual wellness visit, subsequent (Primary)    2. Essential hypertension  -     amLODIPine-valsartan (Exforge)  MG per tablet; Take 1 tablet by mouth Daily.  Dispense: 90 tablet; Refill: 3    3. Gout, unspecified cause, unspecified chronicity, unspecified site  -     allopurinol (ZYLOPRIM) 100 MG tablet; Take 1 tablet by mouth Daily.  Dispense: 90 tablet; Refill: 3    4. Vitamin D deficiency  -     vitamin D (ERGOCALCIFEROL) 1.25 MG (92573 UT) capsule capsule; Take 1 cap twice weekly  Dispense: 24 capsule; Refill: 3    5. Diabetic peripheral neuropathy (CMS/HCC)  -     gabapentin (NEURONTIN) 300 MG capsule; Take 1 capsule by mouth 2 (two) times a day.  Dispense: 180 capsule; Refill: 1      Follow Up:  Return in about 6 months (around 1/13/2022) for Recheck.     An After Visit Summary and PPPS were given to the patient.

## 2021-07-13 NOTE — PATIENT INSTRUCTIONS
Medicare Wellness  Personal Prevention Plan of Service     Date of Office Visit:  2021  Encounter Provider:  James Lau MD  Place of Service:  [facility]  Patient Name: Jose Keys  :  1950    As part of the Medicare Wellness portion of your visit today, we are providing you with this personalized preventive plan of services (PPPS). This plan is based upon recommendations of the [place] Preventive Services Task Force (USPSTF) and the Advisory Committee on Immunization Practices (ACIP).    This lists the preventive care services that should be considered, and provides dates of when you are due. Items listed as completed are up-to-date and do not require any further intervention.    Health Maintenance   Topic Date Due   • COVID-19 Vaccine (1) Never done   • TDAP/TD VACCINES (1 - Tdap) Never done   • Pneumococcal Vaccine 65+ (1 of 1 - PPSV23) Never done   • DIABETIC FOOT EXAM  Never done   • PAP SMEAR  Never done   • DXA SCAN  2020   • INFLUENZA VACCINE  2021   • HEMOGLOBIN A1C  10/08/2021   • MAMMOGRAM  10/20/2021   • LIPID PANEL  2022   • URINE MICROALBUMIN  2022   • DIABETIC EYE EXAM  2022   • ANNUAL WELLNESS VISIT  2022   • COLORECTAL CANCER SCREENING  10/31/2028   • HEPATITIS C SCREENING  Discontinued   • ZOSTER VACCINE  Discontinued       No orders of the defined types were placed in this encounter.      Return in about 6 months (around 2022) for Recheck.

## 2021-08-25 ENCOUNTER — HOSPITAL ENCOUNTER (OUTPATIENT)
Dept: BONE DENSITY | Facility: HOSPITAL | Age: 71
Discharge: HOME | End: 2021-08-25
Admitting: FAMILY MEDICINE

## 2021-08-25 DIAGNOSIS — Z78.0 POSTMENOPAUSAL: ICD-10-CM

## 2021-08-25 PROCEDURE — 77080 DXA BONE DENSITY AXIAL: CPT

## 2021-08-30 DIAGNOSIS — E55.9 VITAMIN D DEFICIENCY: Chronic | ICD-10-CM

## 2021-08-30 RX ORDER — ERGOCALCIFEROL 1.25 MG/1
CAPSULE ORAL
Qty: 24 CAPSULE | Refills: 3 | OUTPATIENT
Start: 2021-08-30

## 2021-09-03 DIAGNOSIS — E55.9 VITAMIN D DEFICIENCY: Chronic | ICD-10-CM

## 2021-09-03 RX ORDER — ERGOCALCIFEROL 1.25 MG/1
CAPSULE ORAL
Qty: 24 CAPSULE | Refills: 3 | Status: SHIPPED | OUTPATIENT
Start: 2021-09-03 | End: 2022-08-24 | Stop reason: ALTCHOICE

## 2021-11-09 DIAGNOSIS — E03.8 HYPOTHYROIDISM DUE TO HASHIMOTO'S THYROIDITIS: Primary | ICD-10-CM

## 2021-11-09 DIAGNOSIS — E06.3 HYPOTHYROIDISM DUE TO HASHIMOTO'S THYROIDITIS: Primary | ICD-10-CM

## 2021-11-09 RX ORDER — LEVOTHYROXINE SODIUM 125 MCG
125 TABLET ORAL DAILY
Qty: 30 TABLET | Refills: 1 | Status: SHIPPED | OUTPATIENT
Start: 2021-11-09 | End: 2022-01-25

## 2021-12-20 ENCOUNTER — OFFICE VISIT (OUTPATIENT)
Dept: ENDOCRINOLOGY | Age: 71
End: 2021-12-20

## 2021-12-20 VITALS
WEIGHT: 195.8 LBS | OXYGEN SATURATION: 98 % | HEART RATE: 68 BPM | DIASTOLIC BLOOD PRESSURE: 78 MMHG | HEIGHT: 65 IN | BODY MASS INDEX: 32.62 KG/M2 | SYSTOLIC BLOOD PRESSURE: 144 MMHG

## 2021-12-20 DIAGNOSIS — E06.3 HYPOTHYROIDISM DUE TO HASHIMOTO'S THYROIDITIS: ICD-10-CM

## 2021-12-20 DIAGNOSIS — E78.2 MIXED HYPERLIPIDEMIA: ICD-10-CM

## 2021-12-20 DIAGNOSIS — E03.8 HYPOTHYROIDISM DUE TO HASHIMOTO'S THYROIDITIS: ICD-10-CM

## 2021-12-20 DIAGNOSIS — E11.40 CONTROLLED TYPE 2 DIABETES MELLITUS WITH DIABETIC NEUROPATHY, WITHOUT LONG-TERM CURRENT USE OF INSULIN (HCC): Primary | ICD-10-CM

## 2021-12-20 DIAGNOSIS — I10 PRIMARY HYPERTENSION: ICD-10-CM

## 2021-12-20 DIAGNOSIS — E55.9 VITAMIN D DEFICIENCY: ICD-10-CM

## 2021-12-20 PROCEDURE — 99214 OFFICE O/P EST MOD 30 MIN: CPT | Performed by: NURSE PRACTITIONER

## 2021-12-20 NOTE — PROGRESS NOTES
Chief Complaint  Chief Complaint   Patient presents with   • Diabetes       Subjective          History of Present Illness    Jose Keys 71 y.o. presents for a follow-up evaluation for type 2 DM    She has been diabetic since 20 years ago.    Pt is on the following medications for their DM: Metformin  mg 2 tablets daily.    More than twice a day, if will give severe diarrhea.      Pt complains of numbness and tingling in feet.    Denies Fatigue, diarrhea or constipation, difficulty breathing,chest pain, palpitations, vision changes, numbness and tingling in feet/hands.    Weight loss of 8 lbs    Pt does not have a history of DM retinopathy.  Last eye exam was 06/21  History of cataract surgery.    Pt does not have a history of nephropathy.  Patient is currently taking ARB    Pt does have neuropathy.  Current takes gabapentin 300 mg BID for this condition, which is prescribed by Dr. James Lau.    Pt does not have a history of CAD or CVA.  Does see cardiology annually    Last A1C in 04/21 was 6.40    Last microalbumin in 04/21 was negative      Blood Sugars    Blood glucoses are not checked.    Pt has 1 episodes of hypoglycemia due to prolonged time between meals.          Hypothyroid    PT complains of losing weight of 8 lbs and  dry skin.    Denies hair loss, diarrhea or constipation difficulty breathing,chest pain, or palpitations.    Current treatment is branded Synthroid 125 mcg daily    Last labs in 04/21 showed TSH 0.440, FT4 1.65          Hyperlipidemia     Pt denies any muscle/body aches, chest pain, or shortness of breath    Pt is currently not taking anything.    Pt has issues with statins in the past - muscle weakness  Zetia causes dizziness and palpitations  Praluent caused anaphylactic symptoms    Father had HLD    Last lipid panel in 04/21 showed Total 230, Triglycerides 262, HDL 40 and           Hypertension    Pt denies any chest pain, palpitations, shortness of breath, or  "headache    Current regimen includes cardediolol 3.125 mg BID and amlodipine-valsartan  mg daily          Vitamin D Deficiency    Current treatment includes 50,000 units twice a week    Last Vit D level was 53.8 in 10/20           I have reviewed the patient's allergies, medicines, past medical hx, family hx and social hx.    Objective   Vital Signs:   /78   Pulse 68   Ht 165.1 cm (65\")   Wt 88.8 kg (195 lb 12.8 oz)   SpO2 98%   BMI 32.58 kg/m²       Physical Exam   Physical Exam  Constitutional:       General: She is not in acute distress.     Appearance: Normal appearance. She is not diaphoretic.   HENT:      Head: Normocephalic and atraumatic.   Eyes:      General:         Right eye: No discharge.         Left eye: No discharge.   Cardiovascular:      Rate and Rhythm: Normal rate and regular rhythm.      Pulses:           Dorsalis pedis pulses are 2+ on the right side and 2+ on the left side.      Heart sounds: Normal heart sounds. No murmur heard.  No friction rub. No gallop.    Pulmonary:      Effort: Pulmonary effort is normal. No respiratory distress.      Breath sounds: Normal breath sounds. No wheezing.   Musculoskeletal:        Feet:    Feet:      Right foot:      Protective Sensation: 5 sites tested. 5 sites sensed.      Left foot:      Protective Sensation: 5 sites tested. 5 sites sensed.   Skin:     General: Skin is warm and dry.   Neurological:      Mental Status: She is alert.   Psychiatric:         Mood and Affect: Mood normal.         Behavior: Behavior normal.                    Results Review:   Hemoglobin A1C   Date Value Ref Range Status   04/08/2021 6.40 (H) 4.80 - 5.60 % Final     Comment:     Hemoglobin A1C Ranges:  Increased Risk for Diabetes  5.7% to 6.4%  Diabetes                     >= 6.5%  Diabetic Goal                < 7.0%       Triglycerides   Date Value Ref Range Status   04/08/2021 262 (H) 0 - 150 mg/dL Final     HDL Cholesterol   Date Value Ref Range Status "   04/08/2021 40 40 - 60 mg/dL Final     LDL Chol Calc (NIH)   Date Value Ref Range Status   04/08/2021 142 (H) 0 - 100 mg/dL Final     VLDL Cholesterol Kyler   Date Value Ref Range Status   04/08/2021 48 (H) 5 - 40 mg/dL Final         Assessment and Plan {CC Problem List  Visit Diagnosis  ROS  Review (Popup)  Health Maintenance  Quality  BestPractice  Medications  SmartSets  SnapShot Encounters  Media :23  Diagnoses and all orders for this visit:    1. Controlled type 2 diabetes mellitus with diabetic neuropathy, without long-term current use of insulin (HCC) (Primary)  -     Comprehensive Metabolic Panel  -     Hemoglobin A1c    Check labs today  Continue with Metformin  mg 2 tablets daily      2. Hypothyroidism due to Hashimoto's thyroiditis  -     TSH  -     T4, Free    Check labs today  Continue with branded Synthroid 125 mcg daily        3. Mixed hyperlipidemia  -     Comprehensive Metabolic Panel  -     Lipid Panel    Check labs today  Continue with weight loss and diet  Statin intolerant  Zetia intolerant  Allergic reaction to Purluent.      4. Primary hypertension  -     Comprehensive Metabolic Panel    Stable  Continue with current medication regimen  Monitor      5. Vitamin D deficiency    Labs in 10/21 showed stable values  Continue with supplement         Advised that she could try OTC plantar wart treatment or make appointment with podiatry.      No refills needed at this time      RTC in 4 months with me and 8 months with Dr. Loera      Follow Up     Patient was given instructions and counseling regarding her condition or for health maintenance advice. Please see specific information pulled into the AVS if appropriate.              Marylu Harry, SHASHI  12/20/21

## 2021-12-21 LAB
ALBUMIN SERPL-MCNC: 4.1 G/DL (ref 3.8–4.8)
ALBUMIN/GLOB SERPL: 1.6 {RATIO} (ref 1.2–2.2)
ALP SERPL-CCNC: 76 IU/L (ref 44–121)
ALT SERPL-CCNC: 19 IU/L (ref 0–32)
AST SERPL-CCNC: 14 IU/L (ref 0–40)
BILIRUB SERPL-MCNC: <0.2 MG/DL (ref 0–1.2)
BUN SERPL-MCNC: 10 MG/DL (ref 8–27)
BUN/CREAT SERPL: 14 (ref 12–28)
CALCIUM SERPL-MCNC: 8.4 MG/DL (ref 8.7–10.3)
CHLORIDE SERPL-SCNC: 107 MMOL/L (ref 96–106)
CHOLEST SERPL-MCNC: 217 MG/DL (ref 100–199)
CO2 SERPL-SCNC: 22 MMOL/L (ref 20–29)
CREAT SERPL-MCNC: 0.7 MG/DL (ref 0.57–1)
GLOBULIN SER CALC-MCNC: 2.5 G/DL (ref 1.5–4.5)
GLUCOSE SERPL-MCNC: ABNORMAL MG/DL
HBA1C MFR BLD: 5.9 % (ref 4.8–5.6)
HDLC SERPL-MCNC: 44 MG/DL
IMP & REVIEW OF LAB RESULTS: NORMAL
LDLC SERPL CALC-MCNC: 136 MG/DL (ref 0–99)
POTASSIUM SERPL-SCNC: ABNORMAL MMOL/L
PROT SERPL-MCNC: 6.6 G/DL (ref 6–8.5)
SODIUM SERPL-SCNC: 141 MMOL/L (ref 134–144)
T4 FREE SERPL-MCNC: 1.17 NG/DL (ref 0.82–1.77)
TRIGL SERPL-MCNC: 206 MG/DL (ref 0–149)
TSH SERPL DL<=0.005 MIU/L-ACNC: 0.78 UIU/ML (ref 0.45–4.5)
VLDLC SERPL CALC-MCNC: 37 MG/DL (ref 5–40)

## 2021-12-21 NOTE — PROGRESS NOTES
Called and talked to patient-    A1C is great at 5.9.     Cholesterol has came down, but still elevated.  At this time pt doesn't want to try nexletol.  She will continue with diet, exercise, weight loss and with add OTC fish oil or krill oil.    Calcium is slightly low- pt states that she had been missing multivitamin.  Continue with daily multivitamin and increase calcium in diet.  She states that she got her DEXA scan back and it was good.     Thyroid levels are good.  Continue with current dose.

## 2022-01-24 DIAGNOSIS — E06.3 HYPOTHYROIDISM DUE TO HASHIMOTO'S THYROIDITIS: ICD-10-CM

## 2022-01-24 DIAGNOSIS — E03.8 HYPOTHYROIDISM DUE TO HASHIMOTO'S THYROIDITIS: ICD-10-CM

## 2022-01-25 RX ORDER — LEVOTHYROXINE SODIUM 125 MCG
125 TABLET ORAL DAILY
Qty: 90 TABLET | Refills: 1 | Status: SHIPPED | OUTPATIENT
Start: 2022-01-25 | End: 2022-08-04

## 2022-03-16 ENCOUNTER — TELEPHONE (OUTPATIENT)
Dept: ENDOCRINOLOGY | Age: 72
End: 2022-03-16

## 2022-03-16 DIAGNOSIS — E03.8 HYPOTHYROIDISM DUE TO HASHIMOTO'S THYROIDITIS: Primary | ICD-10-CM

## 2022-03-16 DIAGNOSIS — I10 PRIMARY HYPERTENSION: ICD-10-CM

## 2022-03-16 DIAGNOSIS — E78.2 MIXED HYPERLIPIDEMIA: ICD-10-CM

## 2022-03-16 DIAGNOSIS — E11.40 CONTROLLED TYPE 2 DIABETES MELLITUS WITH DIABETIC NEUROPATHY, WITHOUT LONG-TERM CURRENT USE OF INSULIN: ICD-10-CM

## 2022-03-16 DIAGNOSIS — E06.3 HYPOTHYROIDISM DUE TO HASHIMOTO'S THYROIDITIS: Primary | ICD-10-CM

## 2022-04-06 DIAGNOSIS — E78.2 MIXED HYPERLIPIDEMIA: ICD-10-CM

## 2022-04-06 DIAGNOSIS — E06.3 HYPOTHYROIDISM DUE TO HASHIMOTO'S THYROIDITIS: Primary | ICD-10-CM

## 2022-04-06 DIAGNOSIS — E03.8 HYPOTHYROIDISM DUE TO HASHIMOTO'S THYROIDITIS: Primary | ICD-10-CM

## 2022-04-06 DIAGNOSIS — E11.8 CONTROLLED TYPE 2 DIABETES MELLITUS WITH COMPLICATION, WITHOUT LONG-TERM CURRENT USE OF INSULIN: ICD-10-CM

## 2022-04-20 ENCOUNTER — OFFICE VISIT (OUTPATIENT)
Dept: ENDOCRINOLOGY | Age: 72
End: 2022-04-20

## 2022-04-20 VITALS
WEIGHT: 193.6 LBS | HEART RATE: 75 BPM | SYSTOLIC BLOOD PRESSURE: 116 MMHG | HEIGHT: 65 IN | BODY MASS INDEX: 32.26 KG/M2 | OXYGEN SATURATION: 93 % | DIASTOLIC BLOOD PRESSURE: 70 MMHG

## 2022-04-20 DIAGNOSIS — E11.40 CONTROLLED TYPE 2 DIABETES MELLITUS WITH DIABETIC NEUROPATHY, WITHOUT LONG-TERM CURRENT USE OF INSULIN: Primary | ICD-10-CM

## 2022-04-20 DIAGNOSIS — E03.8 HYPOTHYROIDISM DUE TO HASHIMOTO'S THYROIDITIS: ICD-10-CM

## 2022-04-20 DIAGNOSIS — E55.9 VITAMIN D DEFICIENCY: ICD-10-CM

## 2022-04-20 DIAGNOSIS — E78.2 MIXED HYPERLIPIDEMIA: ICD-10-CM

## 2022-04-20 DIAGNOSIS — I10 PRIMARY HYPERTENSION: ICD-10-CM

## 2022-04-20 DIAGNOSIS — E06.3 HYPOTHYROIDISM DUE TO HASHIMOTO'S THYROIDITIS: ICD-10-CM

## 2022-04-20 PROCEDURE — 99214 OFFICE O/P EST MOD 30 MIN: CPT | Performed by: NURSE PRACTITIONER

## 2022-04-20 RX ORDER — METFORMIN HYDROCHLORIDE 500 MG/1
1000 TABLET, EXTENDED RELEASE ORAL
Qty: 120 TABLET | Refills: 1
Start: 2022-04-20 | End: 2022-08-24

## 2022-04-20 RX ORDER — OMEGA-3S/DHA/EPA/FISH OIL/D3 300MG-1000
2 CAPSULE ORAL 2 TIMES DAILY
COMMUNITY
End: 2022-11-30

## 2022-04-20 NOTE — PROGRESS NOTES
Chief Complaint  Chief Complaint   Patient presents with   • Diabetes     Type 2       Subjective          History of Present Illness    Jose Keys 71 y.o. presents for a follow-up evaluation for type 2 DM     She has been diabetic since 20 years ago.     Pt is on the following medications for their DM: Metformin  mg 2 tablets daily.     More than twice a day, if will give severe diarrhea.        Pt complains of dizziness mostly in the morning and numbness and tingling in feet.    Denies diarrhea, constipation, chest pain, shortness of breath, vision changes, numbness and tingling in feet/hands.    Weight stable since last visit.    Pt does not have a history of DM retinopathy.  Last eye exam was 06/21  History of cataract surgery.     Pt does not have a history of nephropathy.  Patient is currently taking ARB     Pt does have neuropathy.  Current takes gabapentin 300 mg BID for this condition, which is prescribed by Dr. James Lau.     Pt does not have a history of CAD or CVA.  Does see cardiology annually    Last A1C in 12/21 was 5.9    Last microalbumin in 04/21 was negative          Blood Sugars    Blood glucoses are not checked.          Hypothyroid    PT complains of fatigue and dry skin.    Denies weight changes, constipation, diarrhea, hair loss, chest pain, palpitations, heat intolerance or cold intolerance.    Current treatment is branded Synthroid 125 mcg daily    Last labs in 12/21 showed TSH 0.776 and FT4 1.17         Hyperlipidemia     Pt denies any muscle/body aches, chest pain, or shortness of breath    Pt is currently not taking anything.     Pt has issues with statins in the past - muscle weakness  Zetia causes dizziness and palpitations  Praluent caused anaphylactic symptoms  She didn't want to start Nexletol     Father had HLD    Last lipid panel in 12/21 showed Total 217, Triglycerides 206, HDL 44 and             Hypertension    Pt denies any chest pain, palpitations, shortness  "of breath, or headache    Current regimen includes cardediolol 3.125 mg BID and amlodipine-valsartan  mg daily        Vitamin D Deficiency    Current treatment includes 50,000 units twice a week    Last Vit D level was 53.8 in 10/20           I have reviewed the patient's allergies, medicines, past medical hx, family hx and social hx.    Objective   Vital Signs:   /70   Pulse 75   Ht 165.1 cm (65\")   Wt 87.8 kg (193 lb 9.6 oz)   SpO2 93%   BMI 32.22 kg/m²       Physical Exam   Physical Exam  Constitutional:       General: She is not in acute distress.     Appearance: Normal appearance. She is not diaphoretic.   HENT:      Head: Normocephalic and atraumatic.   Eyes:      General:         Right eye: No discharge.         Left eye: No discharge.   Cardiovascular:      Rate and Rhythm: Normal rate and regular rhythm.      Heart sounds: Normal heart sounds. No murmur heard.    No friction rub. No gallop.   Pulmonary:      Effort: Pulmonary effort is normal. No respiratory distress.      Breath sounds: Normal breath sounds. No wheezing.   Skin:     General: Skin is warm and dry.   Neurological:      Mental Status: She is alert.   Psychiatric:         Mood and Affect: Mood normal.         Behavior: Behavior normal.                    Results Review:   Hemoglobin A1C   Date Value Ref Range Status   12/20/2021 5.9 (H) 4.8 - 5.6 % Final     Comment:              Prediabetes: 5.7 - 6.4           Diabetes: >6.4           Glycemic control for adults with diabetes: <7.0       Triglycerides   Date Value Ref Range Status   12/20/2021 206 (H) 0 - 149 mg/dL Final     HDL Cholesterol   Date Value Ref Range Status   12/20/2021 44 >39 mg/dL Final     LDL Chol Calc (NIH)   Date Value Ref Range Status   12/20/2021 136 (H) 0 - 99 mg/dL Final     VLDL Cholesterol Kyler   Date Value Ref Range Status   12/20/2021 37 5 - 40 mg/dL Final         Assessment and Plan {CC Problem List  Visit Diagnosis  ROS  Review (Popup)  " Health Maintenance  Quality  BestPractice  Medications  [facility]  SnapShot Encounters  Media :23  Diagnoses and all orders for this visit:    1. Controlled type 2 diabetes mellitus with diabetic neuropathy, without long-term current use of insulin (HCC) (Primary)  -     metFORMIN ER (Glucophage XR) 500 MG 24 hr tablet; Take 2 tablets by mouth Daily With Breakfast for 90 days.  Dispense: 120 tablet; Refill: 1  -     Hemoglobin A1c  -     Comprehensive Metabolic Panel  -     Microalbumin / Creatinine Urine Ratio - Urine, Clean Catch    Check labs today  Continue with Metformin  mg 2 tablets daily      2. Hypothyroidism due to Hashimoto's thyroiditis  -     TSH  -     T4, Free    Check labs today  Continue with branded Synthroid 125 mcg daily      3. Mixed hyperlipidemia  -     Comprehensive Metabolic Panel  -     Lipid Panel    Check labs today  Continue with weight loss and diet  Statin intolerant  Zetia intolerant  Allergic reaction to Purluent.  Refused Nexletol        4. Primary hypertension  -     Comprehensive Metabolic Panel    Stable  Continue with current medication regimen  Defer management to PCP      5. Vitamin D deficiency  -     Vitamin D 25 Hydroxy    Check labs today  Continue with supplement       Pt states that she has been having dizziness, just in the morning, for a while now.  Advised patient that there are multiple things that could cause dizziness (medication, vertigo, arrhythmias, etc.).  Needs to follow up with PCP        No refills needed at this time      Labs today  RTC on 08/24/22 with Dr. Loera      Follow Up     Patient was given instructions and counseling regarding her condition or for health maintenance advice. Please see specific information pulled into the AVS if appropriate.              Marylu Harry, SHASHI  04/20/22

## 2022-04-21 LAB
25(OH)D3+25(OH)D2 SERPL-MCNC: 64.7 NG/ML (ref 30–100)
ALBUMIN SERPL-MCNC: 4.3 G/DL (ref 3.8–4.8)
ALBUMIN/CREAT UR: 6 MG/G CREAT (ref 0–29)
ALBUMIN/GLOB SERPL: 1.8 {RATIO} (ref 1.2–2.2)
ALP SERPL-CCNC: 68 IU/L (ref 44–121)
ALT SERPL-CCNC: 26 IU/L (ref 0–32)
AST SERPL-CCNC: 19 IU/L (ref 0–40)
BILIRUB SERPL-MCNC: 0.2 MG/DL (ref 0–1.2)
BUN SERPL-MCNC: 12 MG/DL (ref 8–27)
BUN/CREAT SERPL: 20 (ref 12–28)
CALCIUM SERPL-MCNC: 9.5 MG/DL (ref 8.7–10.3)
CHLORIDE SERPL-SCNC: 103 MMOL/L (ref 96–106)
CHOLEST SERPL-MCNC: 249 MG/DL (ref 100–199)
CO2 SERPL-SCNC: 23 MMOL/L (ref 20–29)
CREAT SERPL-MCNC: 0.6 MG/DL (ref 0.57–1)
CREAT UR-MCNC: 75.4 MG/DL
EGFRCR SERPLBLD CKD-EPI 2021: 96 ML/MIN/1.73
GLOBULIN SER CALC-MCNC: 2.4 G/DL (ref 1.5–4.5)
GLUCOSE SERPL-MCNC: 87 MG/DL (ref 65–99)
HBA1C MFR BLD: 5.9 % (ref 4.8–5.6)
HDLC SERPL-MCNC: 47 MG/DL
IMP & REVIEW OF LAB RESULTS: NORMAL
LDLC SERPL CALC-MCNC: 168 MG/DL (ref 0–99)
MICROALBUMIN UR-MCNC: 4.7 UG/ML
POTASSIUM SERPL-SCNC: 4.8 MMOL/L (ref 3.5–5.2)
PROT SERPL-MCNC: 6.7 G/DL (ref 6–8.5)
SODIUM SERPL-SCNC: 142 MMOL/L (ref 134–144)
T4 FREE SERPL-MCNC: 1.48 NG/DL (ref 0.82–1.77)
TRIGL SERPL-MCNC: 185 MG/DL (ref 0–149)
TSH SERPL DL<=0.005 MIU/L-ACNC: 0.47 UIU/ML (ref 0.45–4.5)
VLDLC SERPL CALC-MCNC: 34 MG/DL (ref 5–40)

## 2022-07-15 DIAGNOSIS — I10 ESSENTIAL HYPERTENSION: Chronic | ICD-10-CM

## 2022-07-15 RX ORDER — AMLODIPINE AND VALSARTAN 10; 320 MG/1; MG/1
1 TABLET ORAL DAILY
Qty: 90 TABLET | OUTPATIENT
Start: 2022-07-15

## 2022-07-15 RX ORDER — AMLODIPINE AND VALSARTAN 10; 320 MG/1; MG/1
1 TABLET ORAL DAILY
Qty: 30 TABLET | Refills: 0 | Status: SHIPPED | OUTPATIENT
Start: 2022-07-15 | End: 2022-08-23

## 2022-08-04 DIAGNOSIS — E03.8 HYPOTHYROIDISM DUE TO HASHIMOTO'S THYROIDITIS: ICD-10-CM

## 2022-08-04 DIAGNOSIS — E11.42 DIABETIC PERIPHERAL NEUROPATHY: Chronic | ICD-10-CM

## 2022-08-04 DIAGNOSIS — E06.3 HYPOTHYROIDISM DUE TO HASHIMOTO'S THYROIDITIS: ICD-10-CM

## 2022-08-04 RX ORDER — GABAPENTIN 300 MG/1
CAPSULE ORAL
Qty: 28 CAPSULE | Refills: 0 | OUTPATIENT
Start: 2022-08-04

## 2022-08-04 RX ORDER — LEVOTHYROXINE SODIUM 125 MCG
125 TABLET ORAL DAILY
Qty: 90 TABLET | Refills: 1 | Status: SHIPPED | OUTPATIENT
Start: 2022-08-04 | End: 2022-12-08

## 2022-08-05 ENCOUNTER — TELEPHONE (OUTPATIENT)
Dept: ENDOCRINOLOGY | Age: 72
End: 2022-08-05

## 2022-08-05 DIAGNOSIS — E11.42 DIABETIC PERIPHERAL NEUROPATHY: Chronic | ICD-10-CM

## 2022-08-05 RX ORDER — GABAPENTIN 300 MG/1
300 CAPSULE ORAL
Qty: 180 CAPSULE | Refills: 1 | OUTPATIENT
Start: 2022-08-05

## 2022-08-05 NOTE — TELEPHONE ENCOUNTER
PT HAS LABS ON 8/10 NO ORDERS  
34 y.o.  patient  FELICIANO 20 @ 36.4 weeks presents with c/o LOF since 2020. Pt denies ctx, fever, chills, nausea, vomiting. States +FM. Antepartum course complicated by low lying placenta - ATU sono 19 - low lying placenta resolved.

## 2022-08-08 DIAGNOSIS — E78.2 MIXED HYPERLIPIDEMIA: Primary | ICD-10-CM

## 2022-08-08 DIAGNOSIS — E11.40 CONTROLLED TYPE 2 DIABETES MELLITUS WITH DIABETIC NEUROPATHY, WITHOUT LONG-TERM CURRENT USE OF INSULIN: ICD-10-CM

## 2022-08-08 DIAGNOSIS — E06.3 HYPOTHYROIDISM DUE TO HASHIMOTO'S THYROIDITIS: ICD-10-CM

## 2022-08-08 DIAGNOSIS — E03.8 HYPOTHYROIDISM DUE TO HASHIMOTO'S THYROIDITIS: ICD-10-CM

## 2022-08-08 DIAGNOSIS — E55.9 VITAMIN D DEFICIENCY: ICD-10-CM

## 2022-08-22 DIAGNOSIS — I10 ESSENTIAL HYPERTENSION: Chronic | ICD-10-CM

## 2022-08-23 RX ORDER — AMLODIPINE AND VALSARTAN 10; 320 MG/1; MG/1
1 TABLET ORAL DAILY
Qty: 14 TABLET | Refills: 0 | Status: SHIPPED | OUTPATIENT
Start: 2022-08-23 | End: 2022-08-29 | Stop reason: SDUPTHER

## 2022-08-23 NOTE — PROGRESS NOTES
Subjective   Jose Keys is a 72 y.o. female.     F/u for dm 2, hypothyroidism, hyperlipidemia, hypertension, vitamin d def/ testing bs 2 x day / last dm eye exam 4/20/21 / last dm foot exam        Patient is a 72-year-old female who came in for follow-up.  She is new to me.    She has known diabetes mellitus since 2002.  She is on metformin  mg 2 tablets daily.  She does not check her blood sugars at home.  She has lost 2 pounds since April 2022.  She does not exercise regularly.  Hemoglobin A1c done in August 2022 is 5.9%.    Her last eye examination was in August 2022.  She has no retinopathy.  Urine microalbumin was normal in April 2022.  She has numbness and tingling in her feet and is on gabapentin 300 mg twice a day prescriber Dr. Lau.  She was seen by a neurologist in the past.    She has hypothyroidism and is on Synthroid 125 mcg a day.  She has no previous history of head or neck radiation since treatment or thyroid surgery.  TSH done in August 2022 is normal at 0.926.    She has chronic diarrhea over the past year.  Her last colonoscopy and EGD were done by Dr. Kaiser  in 2018.  She had a rectal polyp removed.  She has history of wheat allergy which causes itching.    She has hyperlipidemia and is on diet alone.  She had muscle pain with atorvastatin, simvastatin and Zetia before.  She had leg edema with Praluent.  She has not used Repatha or Nexletol before.    She has hypertension and is on Coreg 3.125 twice daily and amlodipine valsartan 10/320 mg daily.  She has no history of heart attack or stroke.  She denies chest pain or shortness of breath.    She has history of vitamin D deficiency and has been on ergocalciferol 50,000 units twice a week for more than a year.    She has history of gout and is on allopurinol 1 tablet/day.      The following portions of the patient's history were reviewed and updated as appropriate: allergies, current medications, past family history, past medical  "history, past social history, past surgical history and problem list.    Review of Systems   Eyes: Negative for visual disturbance.   Respiratory: Negative for shortness of breath.    Cardiovascular: Negative for chest pain and palpitations.   Gastrointestinal: Positive for diarrhea. Negative for anal bleeding and blood in stool.   Genitourinary: Positive for frequency. Negative for dysuria and hematuria.   Musculoskeletal: Positive for back pain. Negative for myalgias.   Neurological: Positive for numbness.     Vitals:    08/24/22 1115   BP: 118/74   Pulse: 61   Temp: 97.7 °F (36.5 °C)   SpO2: 97%   Weight: 86.6 kg (191 lb)   Height: 165.1 cm (65\")      Objective   Physical Exam  Constitutional:       General: She is not in acute distress.     Appearance: Normal appearance. She is not ill-appearing, toxic-appearing or diaphoretic.   Eyes:      General: No scleral icterus.        Right eye: No discharge.         Left eye: No discharge.      Extraocular Movements: Extraocular movements intact.      Conjunctiva/sclera: Conjunctivae normal.   Cardiovascular:      Rate and Rhythm: Normal rate and regular rhythm.      Heart sounds: Normal heart sounds. No murmur heard.    No friction rub. No gallop.   Pulmonary:      Effort: Pulmonary effort is normal. No respiratory distress.      Breath sounds: Normal breath sounds. No stridor. No rales.   Chest:      Chest wall: No tenderness.   Abdominal:      General: Bowel sounds are normal.      Palpations: Abdomen is soft.      Tenderness: There is no right CVA tenderness or left CVA tenderness.   Musculoskeletal:         General: Normal range of motion.   Skin:     General: Skin is warm.   Neurological:      Mental Status: She is alert and oriented to person, place, and time. Mental status is at baseline.   Psychiatric:         Mood and Affect: Mood normal.         Behavior: Behavior normal.       Results Encounter on 08/10/2022   Component Date Value Ref Range Status   • " Hemoglobin A1C 08/10/2022 5.9 (A) 4.8 - 5.6 % Final    Comment:          Prediabetes: 5.7 - 6.4           Diabetes: >6.4           Glycemic control for adults with diabetes: <7.0     • Glucose 08/10/2022 92  65 - 99 mg/dL Final   • BUN 08/10/2022 13  8 - 27 mg/dL Final   • Creatinine 08/10/2022 0.74  0.57 - 1.00 mg/dL Final   • EGFR Result 08/10/2022 86  >59 mL/min/1.73 Final   • BUN/Creatinine Ratio 08/10/2022 18  12 - 28 Final   • Sodium 08/10/2022 143  134 - 144 mmol/L Final   • Potassium 08/10/2022 4.2  3.5 - 5.2 mmol/L Final   • Chloride 08/10/2022 105  96 - 106 mmol/L Final   • Total CO2 08/10/2022 25  20 - 29 mmol/L Final   • Calcium 08/10/2022 9.1  8.7 - 10.3 mg/dL Final   • Total Protein 08/10/2022 6.7  6.0 - 8.5 g/dL Final   • Albumin 08/10/2022 4.5  3.7 - 4.7 g/dL Final   • Globulin 08/10/2022 2.2  1.5 - 4.5 g/dL Final   • A/G Ratio 08/10/2022 2.0  1.2 - 2.2 Final   • Total Bilirubin 08/10/2022 0.4  0.0 - 1.2 mg/dL Final   • Alkaline Phosphatase 08/10/2022 67  44 - 121 IU/L Final   • AST (SGOT) 08/10/2022 18  0 - 40 IU/L Final   • ALT (SGPT) 08/10/2022 21  0 - 32 IU/L Final   • Total Cholesterol 08/10/2022 257 (A) 100 - 199 mg/dL Final   • Triglycerides 08/10/2022 233 (A) 0 - 149 mg/dL Final   • HDL Cholesterol 08/10/2022 43  >39 mg/dL Final   • VLDL Cholesterol Kyler 08/10/2022 44 (A) 5 - 40 mg/dL Final   • LDL Chol Calc (NIH) 08/10/2022 170 (A) 0 - 99 mg/dL Final   • TSH 08/10/2022 0.926  0.450 - 4.500 uIU/mL Final   • Free T4 08/10/2022 1.71  0.82 - 1.77 ng/dL Final   • Interpretation 08/10/2022 Note   Final    Supplemental report is available.     Assessment & Plan   Diagnoses and all orders for this visit:    1. Type 2 diabetes mellitus with peripheral neuropathy (HCC) (Primary)  -     Comprehensive Metabolic Panel; Future  -     Lipid Panel; Future  -     Vitamin B12; Future  -     Celiac Disease Panel; Future    2. Acquired hypothyroidism    3. Mixed hyperlipidemia  -     Lipid Panel; Future    4.  Primary hypertension    5. Vitamin D deficiency  -     Celiac Disease Panel; Future  -     Vitamin D 25 Hydroxy; Future    6. Statin intolerance    7. Controlled type 2 diabetes mellitus with diabetic neuropathy, without long-term current use of insulin (HCC)  -     metFORMIN ER (Glucophage XR) 500 MG 24 hr tablet; Take 2 tablets by mouth Daily With Dinner for 90 days.    Other orders  -     Bempedoic Acid (Nexletol) 180 MG tablet; Take 1 tablet by mouth Daily.  Dispense: 90 tablet; Refill: 2  -     Cholecalciferol (vitamin D3) 125 MCG (5000 UT) tablet tablet; 1 tablet daily.  Finish up current supply of ergocalciferol 50,000 units before starting.  Dispense: 90 tablet; Refill: 2      Decrease metformin ER to 500 mg 1 tablet daily with supper and see if diarrhea improves  Continue Synthroid 125 mcg/day.  Start Nexletol 180 mg daily.  Repeat CMP and lipid panel in 4 to 6 weeks.  Continue Coreg and amlodipine valsartan per Dr. Dong.  Finish up current supply of ergocalciferol and switch to vitamin D3 5000 units/day.    RTC 3 mos with HOLLY Harry NP.    Copy of my note sent to Dr. Lau.  Total time 55 mins.

## 2022-08-24 ENCOUNTER — OFFICE VISIT (OUTPATIENT)
Dept: ENDOCRINOLOGY | Age: 72
End: 2022-08-24

## 2022-08-24 VITALS
OXYGEN SATURATION: 97 % | TEMPERATURE: 97.7 F | HEIGHT: 65 IN | SYSTOLIC BLOOD PRESSURE: 118 MMHG | DIASTOLIC BLOOD PRESSURE: 74 MMHG | BODY MASS INDEX: 31.82 KG/M2 | HEART RATE: 61 BPM | WEIGHT: 191 LBS

## 2022-08-24 DIAGNOSIS — I10 PRIMARY HYPERTENSION: ICD-10-CM

## 2022-08-24 DIAGNOSIS — E11.42 TYPE 2 DIABETES MELLITUS WITH PERIPHERAL NEUROPATHY: Primary | ICD-10-CM

## 2022-08-24 DIAGNOSIS — E03.9 ACQUIRED HYPOTHYROIDISM: ICD-10-CM

## 2022-08-24 DIAGNOSIS — E55.9 VITAMIN D DEFICIENCY: ICD-10-CM

## 2022-08-24 DIAGNOSIS — E11.40 CONTROLLED TYPE 2 DIABETES MELLITUS WITH DIABETIC NEUROPATHY, WITHOUT LONG-TERM CURRENT USE OF INSULIN: ICD-10-CM

## 2022-08-24 DIAGNOSIS — E78.2 MIXED HYPERLIPIDEMIA: ICD-10-CM

## 2022-08-24 DIAGNOSIS — Z78.9 STATIN INTOLERANCE: ICD-10-CM

## 2022-08-24 PROCEDURE — 99215 OFFICE O/P EST HI 40 MIN: CPT | Performed by: INTERNAL MEDICINE

## 2022-08-24 RX ORDER — BEMPEDOIC ACID 180 MG/1
1 TABLET, FILM COATED ORAL DAILY
Qty: 90 TABLET | Refills: 2 | Status: SHIPPED | OUTPATIENT
Start: 2022-08-24 | End: 2022-11-30

## 2022-08-24 RX ORDER — METFORMIN HYDROCHLORIDE 500 MG/1
1000 TABLET, EXTENDED RELEASE ORAL
Status: SHIPPED
Start: 2022-08-24 | End: 2022-11-30

## 2022-08-25 ENCOUNTER — TELEPHONE (OUTPATIENT)
Dept: ENDOCRINOLOGY | Age: 72
End: 2022-08-25

## 2022-08-26 ENCOUNTER — TELEPHONE (OUTPATIENT)
Dept: ENDOCRINOLOGY | Age: 72
End: 2022-08-26

## 2022-08-29 NOTE — PROGRESS NOTES
Subjective   Jose Keys is a 72 y.o. female.     History of Present Illness     Chief Complaint:   Chief Complaint   Patient presents with   • Hypertension     Med refill / no labs   / [facility] per pt    • Arthritis   • medicare wellness     Due        Jose Keys 72 y.o. female who presents today for Medical Management of the below listed issues. She  has a problem list of   Patient Active Problem List   Diagnosis   • Hyperuricemia   • Diabetic peripheral neuropathy (HCC)   • Fatigue   • Hyperlipidemia   • Hypertension   • Hypothyroidism   • Menopause present   • Proteinuria   • Vitamin D deficiency   • Anxiety   • Type 2 diabetes mellitus (HCC)   • LFT elevation   • Neuropathy   • Controlled type 2 diabetes mellitus (HCC)   • Vaginal atrophy   • Gout   • Statin intolerance   .  Since the last visit, She has overall felt well.  she has been compliant with   Current Outpatient Medications:   •  amLODIPine-valsartan (EXFORGE)  MG per tablet, Take 1 tablet by mouth Daily., Disp: 90 tablet, Rfl: 3  •  gabapentin (NEURONTIN) 300 MG capsule, Take 1 capsule by mouth 2 (Two) Times a Day., Disp: 180 capsule, Rfl: 1  •  ACCU-CHEK FASTCLIX LANCETS [facility], Use to test BG 2x daily. DX CODE: E11.65, Disp: 200 each, Rfl: 1  •  allopurinol (Zyloprim) 100 MG tablet, Take 1 tablet by mouth Daily., Disp: 90 tablet, Rfl: 3  •  Bempedoic Acid (Nexletol) 180 MG tablet, Take 1 tablet by mouth Daily., Disp: 90 tablet, Rfl: 2  •  buPROPion XL (WELLBUTRIN XL) 300 MG 24 hr tablet, Take  by mouth Daily., Disp: , Rfl: 2  •  Calcium Carb-Cholecalciferol (CALCIUM 1000 + D PO), Take 3 tablets by mouth Daily. Unknown dose, Disp: , Rfl:   •  carvedilol (COREG) 3.125 MG tablet, Take 1 tablet by mouth 2 (Two) Times a Day With Meals., Disp: 180 tablet, Rfl: 1  •  Cholecalciferol (vitamin D3) 125 MCG (5000 UT) tablet tablet, 1 tablet daily.  Finish up current supply of ergocalciferol 50,000 units before starting., Disp: 90 tablet, Rfl:  "2  •  escitalopram (LEXAPRO) 10 MG tablet, Take  by mouth Daily., Disp: , Rfl: 2  •  glucose blood (ACCU-CHEK SMARTVIEW) test strip, Use to test BG 2x daily, Disp: 200 each, Rfl: 1  •  Lancet Devices (ACCU-CHEK SOFTCLIX) lancets, Use as instructed, Disp: 1 each, Rfl: 0  •  metFORMIN ER (Glucophage XR) 500 MG 24 hr tablet, Take 2 tablets by mouth Daily With Dinner for 90 days., Disp: , Rfl:   •  montelukast (SINGULAIR) 10 MG tablet, Take 10 mg by mouth Daily., Disp: , Rfl: 0  •  Multiple Vitamins-Minerals (MULTIVITAMIN PO), Take 1 tablet by mouth daily., Disp: , Rfl:   •  Multiple Vitamins-Minerals (OCUVITE PO), Take 1 tablet by mouth daily., Disp: , Rfl:   •  NON FORMULARY, Presvison, Disp: , Rfl:   •  Omega-3 Fatty Acids (Fish Oil Burp-Less) 1000 MG capsule, Take 2 capsules by mouth 2 (Two) Times a Day., Disp: , Rfl:   •  Synthroid 125 MCG tablet, TAKE 1 TABLET BY MOUTH DAILY, Disp: 90 tablet, Rfl: 1.  She denies medication side effects.    All of the other chronic condition(s) listed above are stable w/o issues.    /74   Pulse 60   Temp 97.7 °F (36.5 °C) (Oral)   Resp 14   Ht 165.1 cm (65\")   Wt 86.6 kg (191 lb)   BMI 31.78 kg/m²     Results for orders placed or performed in visit on 08/10/22   Hemoglobin A1c    Specimen: Blood   Result Value Ref Range    Hemoglobin A1C 5.9 (H) 4.8 - 5.6 %   Comprehensive Metabolic Panel    Specimen: Blood   Result Value Ref Range    Glucose 92 65 - 99 mg/dL    BUN 13 8 - 27 mg/dL    Creatinine 0.74 0.57 - 1.00 mg/dL    EGFR Result 86 >59 mL/min/1.73    BUN/Creatinine Ratio 18 12 - 28    Sodium 143 134 - 144 mmol/L    Potassium 4.2 3.5 - 5.2 mmol/L    Chloride 105 96 - 106 mmol/L    Total CO2 25 20 - 29 mmol/L    Calcium 9.1 8.7 - 10.3 mg/dL    Total Protein 6.7 6.0 - 8.5 g/dL    Albumin 4.5 3.7 - 4.7 g/dL    Globulin 2.2 1.5 - 4.5 g/dL    A/G Ratio 2.0 1.2 - 2.2    Total Bilirubin 0.4 0.0 - 1.2 mg/dL    Alkaline Phosphatase 67 44 - 121 IU/L    AST (SGOT) 18 0 - 40 " IU/L    ALT (SGPT) 21 0 - 32 IU/L   Lipid Panel    Specimen: Blood   Result Value Ref Range    Total Cholesterol 257 (H) 100 - 199 mg/dL    Triglycerides 233 (H) 0 - 149 mg/dL    HDL Cholesterol 43 >39 mg/dL    VLDL Cholesterol Kyler 44 (H) 5 - 40 mg/dL    LDL Chol Calc (NIH) 170 (H) 0 - 99 mg/dL   TSH    Specimen: Blood   Result Value Ref Range    TSH 0.926 0.450 - 4.500 uIU/mL   T4, Free    Specimen: Blood   Result Value Ref Range    Free T4 1.71 0.82 - 1.77 ng/dL   Cardiovascular Risk Assessment   Result Value Ref Range    Interpretation Note              The following portions of the patient's history were reviewed and updated as appropriate: allergies, current medications, past family history, past medical history, past social history, past surgical history, and problem list.    Review of Systems   Constitutional: Negative for activity change, chills and fever.   Respiratory: Negative for cough.    Cardiovascular: Negative for chest pain.   Psychiatric/Behavioral: Negative for dysphoric mood.       Objective   Physical Exam  Constitutional:       General: She is not in acute distress.     Appearance: She is well-developed.   Cardiovascular:      Rate and Rhythm: Normal rate and regular rhythm.   Pulmonary:      Effort: Pulmonary effort is normal.      Breath sounds: Normal breath sounds.   Neurological:      Mental Status: She is alert and oriented to person, place, and time.   Psychiatric:         Behavior: Behavior normal.         Thought Content: Thought content normal.     Labs from his endocrinologist reviewed by me at today's visit.        Diagnoses and all orders for this visit:    1. Medicare annual wellness visit, subsequent (Primary)    2. Essential hypertension  -     amLODIPine-valsartan (EXFORGE)  MG per tablet; Take 1 tablet by mouth Daily.  Dispense: 90 tablet; Refill: 3    3. Diabetic peripheral neuropathy (HCC)  -     gabapentin (NEURONTIN) 300 MG capsule; Take 1 capsule by mouth 2 (Two)  Times a Day.  Dispense: 180 capsule; Refill: 1    4. Gout, unspecified cause, unspecified chronicity, unspecified site  -     allopurinol (Zyloprim) 100 MG tablet; Take 1 tablet by mouth Daily.  Dispense: 90 tablet; Refill: 3

## 2022-08-30 ENCOUNTER — OFFICE VISIT (OUTPATIENT)
Dept: FAMILY MEDICINE CLINIC | Facility: CLINIC | Age: 72
End: 2022-08-30

## 2022-08-30 VITALS
DIASTOLIC BLOOD PRESSURE: 74 MMHG | WEIGHT: 191 LBS | TEMPERATURE: 97.7 F | SYSTOLIC BLOOD PRESSURE: 136 MMHG | HEART RATE: 60 BPM | RESPIRATION RATE: 14 BRPM | HEIGHT: 65 IN | BODY MASS INDEX: 31.82 KG/M2

## 2022-08-30 DIAGNOSIS — E11.42 DIABETIC PERIPHERAL NEUROPATHY: Chronic | ICD-10-CM

## 2022-08-30 DIAGNOSIS — I10 ESSENTIAL HYPERTENSION: Chronic | ICD-10-CM

## 2022-08-30 DIAGNOSIS — M10.9 GOUT, UNSPECIFIED CAUSE, UNSPECIFIED CHRONICITY, UNSPECIFIED SITE: Chronic | ICD-10-CM

## 2022-08-30 DIAGNOSIS — Z00.00 MEDICARE ANNUAL WELLNESS VISIT, SUBSEQUENT: Primary | ICD-10-CM

## 2022-08-30 PROCEDURE — G0439 PPPS, SUBSEQ VISIT: HCPCS | Performed by: FAMILY MEDICINE

## 2022-08-30 PROCEDURE — 1160F RVW MEDS BY RX/DR IN RCRD: CPT | Performed by: FAMILY MEDICINE

## 2022-08-30 PROCEDURE — 1170F FXNL STATUS ASSESSED: CPT | Performed by: FAMILY MEDICINE

## 2022-08-30 PROCEDURE — 1126F AMNT PAIN NOTED NONE PRSNT: CPT | Performed by: FAMILY MEDICINE

## 2022-08-30 RX ORDER — GABAPENTIN 300 MG/1
300 CAPSULE ORAL 2 TIMES DAILY
Qty: 180 CAPSULE | Refills: 1 | Status: CANCELLED | OUTPATIENT
Start: 2022-08-30

## 2022-08-30 RX ORDER — AMLODIPINE AND VALSARTAN 10; 320 MG/1; MG/1
1 TABLET ORAL DAILY
Qty: 90 TABLET | Refills: 3 | Status: SHIPPED | OUTPATIENT
Start: 2022-08-30

## 2022-08-30 RX ORDER — ALLOPURINOL 100 MG/1
100 TABLET ORAL DAILY
Qty: 90 TABLET | Refills: 3 | Status: SHIPPED | OUTPATIENT
Start: 2022-08-30

## 2022-08-30 RX ORDER — GABAPENTIN 300 MG/1
300 CAPSULE ORAL 2 TIMES DAILY
Qty: 180 CAPSULE | Refills: 1 | Status: SHIPPED | OUTPATIENT
Start: 2022-08-30

## 2022-08-30 NOTE — PROGRESS NOTES
The ABCs of the Annual Wellness Visit  Subsequent Medicare Wellness Visit    Chief Complaint   Patient presents with   • Hypertension     Med refill / no labs   / [facility] per pt    • Arthritis   • medicare wellness     Due       Subjective    History of Present Illness:  Jose Keys is a 72 y.o. female who presents for a Subsequent Medicare Wellness Visit.    The following portions of the patient's history were reviewed and   updated as appropriate: allergies, current medications, past family history, past medical history, past social history, past surgical history and problem list.    Compared to one year ago, the patient feels her physical   health is the same.    Compared to one year ago, the patient feels her mental   health is the same.    Recent Hospitalizations:  She was not admitted to the hospital during the last year.       Current Medical Providers:  Patient Care Team:  James Lau MD as PCP - General  Adelita Nguyen MD as Consulting Physician (Endocrinology)  Kammerling, James M, MD as Consulting Physician (Cardiac Electrophysiology)    Outpatient Medications Prior to Visit   Medication Sig Dispense Refill   • gabapentin (NEURONTIN) 300 MG capsule Take 1 capsule by mouth 2 (two) times a day. 180 capsule 1   • ACCU-CHEK FASTCLIX LANCETS misc Use to test BG 2x daily. DX CODE: E11.65 200 each 1   • Bempedoic Acid (Nexletol) 180 MG tablet Take 1 tablet by mouth Daily. 90 tablet 2   • buPROPion XL (WELLBUTRIN XL) 300 MG 24 hr tablet Take  by mouth Daily.  2   • Calcium Carb-Cholecalciferol (CALCIUM 1000 + D PO) Take 3 tablets by mouth Daily. Unknown dose     • carvedilol (COREG) 3.125 MG tablet Take 1 tablet by mouth 2 (Two) Times a Day With Meals. 180 tablet 1   • Cholecalciferol (vitamin D3) 125 MCG (5000 UT) tablet tablet 1 tablet daily.  Finish up current supply of ergocalciferol 50,000 units before starting. 90 tablet 2   • escitalopram (LEXAPRO) 10 MG tablet Take  by mouth Daily.  2   •  glucose blood (ACCU-CHEK SMARTVIEW) test strip Use to test BG 2x daily 200 each 1   • Lancet Devices (ACCU-CHEK SOFTCLIX) lancets Use as instructed 1 each 0   • metFORMIN ER (Glucophage XR) 500 MG 24 hr tablet Take 2 tablets by mouth Daily With Dinner for 90 days.     • montelukast (SINGULAIR) 10 MG tablet Take 10 mg by mouth Daily.  0   • Multiple Vitamins-Minerals (MULTIVITAMIN PO) Take 1 tablet by mouth daily.     • Multiple Vitamins-Minerals (OCUVITE PO) Take 1 tablet by mouth daily.     • NON FORMULARY Presvison     • Omega-3 Fatty Acids (Fish Oil Burp-Less) 1000 MG capsule Take 2 capsules by mouth 2 (Two) Times a Day.     • Synthroid 125 MCG tablet TAKE 1 TABLET BY MOUTH DAILY 90 tablet 1   • amLODIPine-valsartan (EXFORGE)  MG per tablet TAKE 1 TABLET BY MOUTH DAILY 14 tablet 0     No facility-administered medications prior to visit.       No opioid medication identified on active medication list. I have reviewed chart for other potential  high risk medication/s and harmful drug interactions in the elderly.          Aspirin is not on active medication list.  Aspirin use is not indicated based on review of current medical condition/s. Risk of harm outweighs potential benefits.  .    Patient Active Problem List   Diagnosis   • Hyperuricemia   • Diabetic peripheral neuropathy (HCC)   • Fatigue   • Hyperlipidemia   • Hypertension   • Hypothyroidism   • Menopause present   • Proteinuria   • Vitamin D deficiency   • Anxiety   • Type 2 diabetes mellitus (HCC)   • LFT elevation   • Neuropathy   • Controlled type 2 diabetes mellitus (HCC)   • Vaginal atrophy   • Gout   • Statin intolerance     Advance Care Planning  Advance Directive is not on file.  ACP discussion was held with the patient during this visit. Patient does not have an advance directive, declines further assistance.          Objective    Vitals:    08/29/22 1636   BP: 136/74   Pulse: 60   Resp: 14   Temp: 97.7 °F (36.5 °C)   TempSrc: Oral   Weight:  "86.6 kg (191 lb)   Height: 165.1 cm (65\")   PainSc: 0-No pain     Estimated body mass index is 31.78 kg/m² as calculated from the following:    Height as of this encounter: 165.1 cm (65\").    Weight as of this encounter: 86.6 kg (191 lb).    BMI is >= 30 and <35. (Class 1 Obesity). The following options were offered after discussion;: exercise counseling/recommendations and nutrition counseling/recommendations      Does the patient have evidence of cognitive impairment? No    Physical Exam  Lab Results   Component Value Date    CHLPL 257 (H) 08/10/2022    TRIG 233 (H) 08/10/2022    HDL 43 08/10/2022     (H) 08/10/2022    VLDL 44 (H) 08/10/2022    HGBA1C 5.9 (H) 08/10/2022            HEALTH RISK ASSESSMENT    Smoking Status:  Social History     Tobacco Use   Smoking Status Never Smoker   Smokeless Tobacco Never Used     Alcohol Consumption:  Social History     Substance and Sexual Activity   Alcohol Use Yes    Comment: socially      Fall Risk Screen:    [facility] Fall Risk Assessment has not been completed.    Depression Screening:  PHQ-2/PHQ-9 Depression Screening 8/30/2022   Retired PHQ-9 Total Score -   Retired Total Score -   Little Interest or Pleasure in Doing Things 0-->not at all   Feeling Down, Depressed or Hopeless 0-->not at all   PHQ-9: Brief Depression Severity Measure Score 0       Health Habits and Functional and Cognitive Screening:  Functional & Cognitive Status 8/30/2022   Do you have difficulty preparing food and eating? No   Do you have difficulty bathing yourself, getting dressed or grooming yourself? No   Do you have difficulty using the toilet? No   Do you have difficulty moving around from place to place? No   Do you have trouble with steps or getting out of a bed or a chair? No   Current Diet Well Balanced Diet   Dental Exam Up to date   Eye Exam Up to date   Exercise (times per week) 2 times per week   Current Exercises Include Walking   Current Exercise Activities Include -   Do you " need help using the phone?  No   Are you deaf or do you have serious difficulty hearing?  No   Do you need help with transportation? No   Do you need help shopping? No   Do you need help preparing meals?  No   Do you need help with housework?  No   Do you need help with laundry? No   Do you need help taking your medications? No   Do you need help managing money? No   Do you ever drive or ride in a car without wearing a seat belt? No   Have you felt unusual stress, anger or loneliness in the last month? No   Who do you live with? Spouse   If you need help, do you have trouble finding someone available to you? Yes   Have you been bothered in the last four weeks by sexual problems? No   Do you have difficulty concentrating, remembering or making decisions? No       Age-appropriate Screening Schedule:  Refer to the list below for future screening recommendations based on patient's age, sex and/or medical conditions. Orders for these recommended tests are listed in the plan section. The patient has been provided with a written plan.    Health Maintenance   Topic Date Due   • TDAP/TD VACCINES (1 - Tdap) Never done   • PAP SMEAR  Never done   • INFLUENZA VACCINE  10/01/2022   • MAMMOGRAM  12/11/2022   • HEMOGLOBIN A1C  02/10/2023   • DIABETIC FOOT EXAM  04/20/2023   • URINE MICROALBUMIN  04/20/2023   • DIABETIC EYE EXAM  08/01/2023   • LIPID PANEL  08/10/2023   • DXA SCAN  08/25/2023   • ZOSTER VACCINE  Discontinued              Assessment & Plan   CMS Preventative Services Quick Reference  Risk Factors Identified During Encounter  Cardiovascular Disease  The above risks/problems have been discussed with the patient.  Follow up actions/plans if indicated are seen below in the Assessment/Plan Section.  Pertinent information has been shared with the patient in the After Visit Summary.    Diagnoses and all orders for this visit:    1. Medicare annual wellness visit, subsequent (Primary)    2. Essential hypertension  -      amLODIPine-valsartan (EXFORGE)  MG per tablet; Take 1 tablet by mouth Daily.  Dispense: 90 tablet; Refill: 3    3. Diabetic peripheral neuropathy (HCC)  -     gabapentin (NEURONTIN) 300 MG capsule; Take 1 capsule by mouth 2 (Two) Times a Day.  Dispense: 180 capsule; Refill: 1    4. Gout, unspecified cause, unspecified chronicity, unspecified site  -     allopurinol (Zyloprim) 100 MG tablet; Take 1 tablet by mouth Daily.  Dispense: 90 tablet; Refill: 3        Follow Up:   No follow-ups on file.     An After Visit Summary and PPPS were made available to the patient.          I spent 14 minutes caring for Jose on this date of service. This time includes time spent by me in the following activities:preparing for the visit and reviewing tests

## 2022-09-20 ENCOUNTER — TELEPHONE (OUTPATIENT)
Dept: ENDOCRINOLOGY | Age: 72
End: 2022-09-20

## 2022-09-20 ENCOUNTER — LAB (OUTPATIENT)
Dept: ENDOCRINOLOGY | Age: 72
End: 2022-09-20

## 2022-09-20 DIAGNOSIS — E55.9 VITAMIN D DEFICIENCY: ICD-10-CM

## 2022-09-20 DIAGNOSIS — E11.42 TYPE 2 DIABETES MELLITUS WITH PERIPHERAL NEUROPATHY: ICD-10-CM

## 2022-09-20 DIAGNOSIS — E78.2 MIXED HYPERLIPIDEMIA: ICD-10-CM

## 2022-09-20 NOTE — TELEPHONE ENCOUNTER
Patient was in office today for labs she stated that her insurance denied the Nexletol  She got a letter  She is wanting to know the next step  She did stat that her cardiologist has a lipid clinic that she can go to if needed

## 2022-09-21 LAB
25(OH)D3+25(OH)D2 SERPL-MCNC: 91.3 NG/ML (ref 30–100)
ALBUMIN SERPL-MCNC: 4.7 G/DL (ref 3.5–5.2)
ALBUMIN/GLOB SERPL: 2.2 G/DL
ALP SERPL-CCNC: 60 U/L (ref 39–117)
ALT SERPL-CCNC: 30 U/L (ref 1–33)
AST SERPL-CCNC: 26 U/L (ref 1–32)
BILIRUB SERPL-MCNC: 0.3 MG/DL (ref 0–1.2)
BUN SERPL-MCNC: 19 MG/DL (ref 8–23)
BUN/CREAT SERPL: 24.4 (ref 7–25)
CALCIUM SERPL-MCNC: 9.8 MG/DL (ref 8.6–10.5)
CHLORIDE SERPL-SCNC: 106 MMOL/L (ref 98–107)
CHOLEST SERPL-MCNC: 161 MG/DL (ref 0–200)
CO2 SERPL-SCNC: 28.1 MMOL/L (ref 22–29)
CREAT SERPL-MCNC: 0.78 MG/DL (ref 0.57–1)
EGFRCR SERPLBLD CKD-EPI 2021: 81.3 ML/MIN/1.73
ENDOMYSIUM IGA SER QL: NEGATIVE
GLOBULIN SER CALC-MCNC: 2.1 GM/DL
GLUCOSE SERPL-MCNC: 97 MG/DL (ref 65–99)
HDLC SERPL-MCNC: 37 MG/DL (ref 40–60)
IGA SERPL-MCNC: 208 MG/DL (ref 64–422)
IMP & REVIEW OF LAB RESULTS: NORMAL
LDLC SERPL CALC-MCNC: 87 MG/DL (ref 0–100)
POTASSIUM SERPL-SCNC: 5.2 MMOL/L (ref 3.5–5.2)
PROT SERPL-MCNC: 6.8 G/DL (ref 6–8.5)
SODIUM SERPL-SCNC: 144 MMOL/L (ref 136–145)
TRIGL SERPL-MCNC: 221 MG/DL (ref 0–150)
TTG IGA SER-ACNC: <2 U/ML (ref 0–3)
VIT B12 SERPL-MCNC: 801 PG/ML (ref 211–946)
VLDLC SERPL CALC-MCNC: 37 MG/DL (ref 5–40)

## 2022-09-23 NOTE — PROGRESS NOTES
High normal 25 hydroxy vitamin D.  Patient will be switching from ergocalciferol 50,000 units twice a week to vitamin D3 5000 units daily.  Celiac panel negative.  Normal vitamin B12.  LDL lower at 87 mg per DL.  HDL low at 37 mg per DL.  Triglycerides 221.  Continue Nexletol 180 mg/day.  Copy of labs sent to Dr. Lau.  Please notify patient of results.  Please check if her diarrhea has improved with reduction in metformin ER dose.

## 2022-11-08 ENCOUNTER — PATIENT MESSAGE (OUTPATIENT)
Dept: FAMILY MEDICINE CLINIC | Facility: CLINIC | Age: 72
End: 2022-11-08

## 2022-11-08 DIAGNOSIS — Z12.31 ENCOUNTER FOR SCREENING MAMMOGRAM FOR MALIGNANT NEOPLASM OF BREAST: Primary | ICD-10-CM

## 2022-11-08 NOTE — TELEPHONE ENCOUNTER
From: Jose Keys  To: James Lau MD  Sent: 11/8/2022 1:35 PM EST  Subject: Yearly mammogram is due    I will need a referral for this procedure. Thank you. I go to [facility].

## 2022-11-14 ENCOUNTER — TELEPHONE (OUTPATIENT)
Dept: ENDOCRINOLOGY | Age: 72
End: 2022-11-14

## 2022-11-14 DIAGNOSIS — E78.2 MIXED HYPERLIPIDEMIA: Primary | ICD-10-CM

## 2022-11-14 DIAGNOSIS — E03.9 ACQUIRED HYPOTHYROIDISM: ICD-10-CM

## 2022-11-14 DIAGNOSIS — E11.40 CONTROLLED TYPE 2 DIABETES MELLITUS WITH DIABETIC NEUROPATHY, WITHOUT LONG-TERM CURRENT USE OF INSULIN: ICD-10-CM

## 2022-11-14 NOTE — TELEPHONE ENCOUNTER
Patient is asking if she can do blood work before her appointment. If so, do you mind entering the orders?

## 2022-11-30 ENCOUNTER — OFFICE VISIT (OUTPATIENT)
Dept: ENDOCRINOLOGY | Age: 72
End: 2022-11-30

## 2022-11-30 VITALS
HEIGHT: 65 IN | BODY MASS INDEX: 31.65 KG/M2 | DIASTOLIC BLOOD PRESSURE: 60 MMHG | HEART RATE: 111 BPM | SYSTOLIC BLOOD PRESSURE: 122 MMHG | WEIGHT: 190 LBS | TEMPERATURE: 95.6 F | OXYGEN SATURATION: 96 %

## 2022-11-30 DIAGNOSIS — E03.9 ACQUIRED HYPOTHYROIDISM: ICD-10-CM

## 2022-11-30 DIAGNOSIS — I10 PRIMARY HYPERTENSION: ICD-10-CM

## 2022-11-30 DIAGNOSIS — E11.40 CONTROLLED TYPE 2 DIABETES MELLITUS WITH DIABETIC NEUROPATHY, WITHOUT LONG-TERM CURRENT USE OF INSULIN: Primary | ICD-10-CM

## 2022-11-30 DIAGNOSIS — E55.9 VITAMIN D DEFICIENCY: ICD-10-CM

## 2022-11-30 DIAGNOSIS — E78.2 MIXED HYPERLIPIDEMIA: ICD-10-CM

## 2022-11-30 PROCEDURE — 99214 OFFICE O/P EST MOD 30 MIN: CPT | Performed by: NURSE PRACTITIONER

## 2022-11-30 RX ORDER — METFORMIN HYDROCHLORIDE 500 MG/1
500 TABLET, EXTENDED RELEASE ORAL DAILY
Qty: 90 TABLET | Refills: 1
Start: 2022-11-30 | End: 2022-12-06 | Stop reason: SDUPTHER

## 2022-11-30 RX ORDER — CETIRIZINE HYDROCHLORIDE 10 MG/1
10 TABLET ORAL DAILY
COMMUNITY
Start: 2022-11-02

## 2022-11-30 NOTE — PROGRESS NOTES
Chief Complaint  Chief Complaint   Patient presents with   • Diabetes     Type2: Patient doesn't use a meter to check her bs, she has no hx of retinopathy with a moderate case of neuropathy in her feet        Subjective          History of Present Illness    Jose Keys 72 y.o. presents for a follow-up evaluation for type 2 DM     She has been diabetic since 20 years ago.     Pt is on the following medications for their DM: Metformin  mg 1 tablets daily.     More than twice a day, if will give severe diarrhea.       Pt complains of intermittent diarrhea, but better, and numbness and tingling in feet    Denies constipation, chest pain, shortness of breath and vision changes.    Weight stable since last visit.    Pt does not have a history of DM retinopathy.  Last eye exam was 08/22  History of cataract surgery.     Pt does not have a history of nephropathy.  Patient is currently taking ARB     Pt does have neuropathy.  Current takes gabapentin 300 mg BID for this condition, which is prescribed by Dr. James Lau.     Pt does not have a history of CAD or CVA.  Does see cardiology annually    Last A1C in 11/22 was 6.0    Last microalbumin in 04/22 was negative          Blood Sugars    Blood glucoses are not checked         Hypothyroid    PT complains of dry skin    Denies weight changes, constipation, hair loss, chest pain, palpitations, heat intolerance or cold intolerance.    Current treatment is branded Synthroid 125 mcg daily    Last labs in 11/22 showed TSH 0.479            Hyperlipidemia     Pt is currently not taking anything.     Pt has issues with statins in the past - muscle weakness  Zetia causes dizziness and palpitations  Praluent caused anaphylactic symptoms  Nexletol was denied by insurance     Father had HLD    Last lipid panel in 11/22 showed Total 248, Triglycerides 251, HDL 47 and             Hypertension    Pt denies any chest pain, palpitations, shortness of breath, or  "headache    Current regimen includes cardediolol 3.125 mg BID and amlodipine-valsartan  mg daily          Vitamin D Deficiency    Current treatment includes 5,000 units daily    Last Vit D level was 91.3 in 09/22            I have reviewed the patient's allergies, medicines, past medical hx, family hx and social hx.    Objective   Vital Signs:   /60   Pulse 111   Temp 95.6 °F (35.3 °C) (Temporal)   Ht 165.1 cm (65\")   Wt 86.2 kg (190 lb)   SpO2 96%   BMI 31.62 kg/m²       Physical Exam   Physical Exam  Constitutional:       General: She is not in acute distress.     Appearance: Normal appearance. She is not diaphoretic.   HENT:      Head: Normocephalic and atraumatic.   Eyes:      General:         Right eye: No discharge.         Left eye: No discharge.   Skin:     General: Skin is warm and dry.   Neurological:      Mental Status: She is alert.   Psychiatric:         Mood and Affect: Mood normal.         Behavior: Behavior normal.                    Results Review:   Hemoglobin A1C   Date Value Ref Range Status   11/22/2022 6.00 (H) 4.80 - 5.60 % Final     Comment:     Hemoglobin A1C Ranges:  Increased Risk for Diabetes  5.7% to 6.4%  Diabetes                     >= 6.5%  Diabetic Goal                < 7.0%       Triglycerides   Date Value Ref Range Status   11/22/2022 251 (H) 0 - 150 mg/dL Final     HDL Cholesterol   Date Value Ref Range Status   11/22/2022 47 40 - 60 mg/dL Final     LDL Chol Calc (NIH)   Date Value Ref Range Status   11/22/2022 155 (H) 0 - 100 mg/dL Final     VLDL Cholesterol Kyler   Date Value Ref Range Status   11/22/2022 46 (H) 5 - 40 mg/dL Final         Assessment and Plan {CC Problem List  Visit Diagnosis  ROS  Review (Popup)  Health Maintenance  Quality  BestPractice  Medications  SmartSets  SnapShot Encounters  Media :23  Diagnoses and all orders for this visit:    1. Controlled type 2 diabetes mellitus with diabetic neuropathy, without long-term current use of " insulin (HCC) (Primary)  -     metFORMIN ER (Glucophage XR) 500 MG 24 hr tablet; Take 1 tablet by mouth Daily.  Dispense: 90 tablet; Refill: 1  -     Hemoglobin A1c; Future  -     Comprehensive Metabolic Panel; Future  -     Microalbumin / Creatinine Urine Ratio - Urine, Clean Catch; Future    A1C is good at 6%  Continue with Metformin  mg 1 tablets daily      2. Acquired hypothyroidism  -     TSH; Future  -     T4, Free; Future    Thyroid labs are good  Continue with branded Synthroid 125 mcg daily      3. Mixed hyperlipidemia  -     Comprehensive Metabolic Panel; Future  -     Lipid Panel; Future    Insurance denied Nexletol - may try next year  Continue with dietary modification      4. Primary hypertension  -     Comprehensive Metabolic Panel; Future    Stable  Continue with current medication regimen  Defer management to PCP        5. Vitamin D deficiency  -     Cholecalciferol (vitamin D3) 125 MCG (5000 UT) tablet tablet; 1 tablet daily.  Finish up current supply of ergocalciferol 50,000 units before starting.  Dispense: 90 tablet; Refill: 1  -     Vitamin D,25-Hydroxy; Future     Continue with supplement        Refills sent to pharmacy      RTC in 4 months with me, labs prior and 8 months with Dr. Loera      Follow Up     Patient was given instructions and counseling regarding her condition or for health maintenance advice. Please see specific information pulled into the AVS if appropriate.              Marylu Harry, SHASHI  11/30/22

## 2022-12-02 DIAGNOSIS — E11.40 CONTROLLED TYPE 2 DIABETES MELLITUS WITH DIABETIC NEUROPATHY, WITHOUT LONG-TERM CURRENT USE OF INSULIN: ICD-10-CM

## 2022-12-05 RX ORDER — METFORMIN HYDROCHLORIDE 500 MG/1
500 TABLET, EXTENDED RELEASE ORAL DAILY
Qty: 90 TABLET | Refills: 1
Start: 2022-12-05

## 2022-12-06 DIAGNOSIS — E11.40 CONTROLLED TYPE 2 DIABETES MELLITUS WITH DIABETIC NEUROPATHY, WITHOUT LONG-TERM CURRENT USE OF INSULIN: ICD-10-CM

## 2022-12-06 RX ORDER — METFORMIN HYDROCHLORIDE 500 MG/1
500 TABLET, EXTENDED RELEASE ORAL DAILY
Qty: 90 TABLET | Refills: 1 | Status: SHIPPED | OUTPATIENT
Start: 2022-12-06

## 2022-12-08 DIAGNOSIS — E03.8 HYPOTHYROIDISM DUE TO HASHIMOTO'S THYROIDITIS: ICD-10-CM

## 2022-12-08 DIAGNOSIS — E06.3 HYPOTHYROIDISM DUE TO HASHIMOTO'S THYROIDITIS: ICD-10-CM

## 2022-12-08 RX ORDER — LEVOTHYROXINE SODIUM 125 MCG
125 TABLET ORAL DAILY
Qty: 90 TABLET | Refills: 1 | Status: SHIPPED | OUTPATIENT
Start: 2022-12-08

## 2023-03-07 ENCOUNTER — LAB (OUTPATIENT)
Dept: ENDOCRINOLOGY | Age: 73
End: 2023-03-07
Payer: MEDICARE

## 2023-03-07 DIAGNOSIS — E03.9 ACQUIRED HYPOTHYROIDISM: ICD-10-CM

## 2023-03-07 DIAGNOSIS — E78.2 MIXED HYPERLIPIDEMIA: ICD-10-CM

## 2023-03-07 DIAGNOSIS — I10 PRIMARY HYPERTENSION: ICD-10-CM

## 2023-03-07 DIAGNOSIS — E55.9 VITAMIN D DEFICIENCY: ICD-10-CM

## 2023-03-07 DIAGNOSIS — E11.40 CONTROLLED TYPE 2 DIABETES MELLITUS WITH DIABETIC NEUROPATHY, WITHOUT LONG-TERM CURRENT USE OF INSULIN: ICD-10-CM

## 2023-03-08 LAB
25(OH)D3+25(OH)D2 SERPL-MCNC: 51.8 NG/ML (ref 30–100)
ALBUMIN SERPL-MCNC: 4.8 G/DL (ref 3.5–5.2)
ALBUMIN/CREAT UR: 17 MG/G CREAT (ref 0–29)
ALBUMIN/GLOB SERPL: 1.8 G/DL
ALP SERPL-CCNC: 72 U/L (ref 39–117)
ALT SERPL-CCNC: 26 U/L (ref 1–33)
AST SERPL-CCNC: 19 U/L (ref 1–32)
BILIRUB SERPL-MCNC: 0.3 MG/DL (ref 0–1.2)
BUN SERPL-MCNC: 13 MG/DL (ref 8–23)
BUN/CREAT SERPL: 18.3 (ref 7–25)
CALCIUM SERPL-MCNC: 9.5 MG/DL (ref 8.6–10.5)
CHLORIDE SERPL-SCNC: 106 MMOL/L (ref 98–107)
CHOLEST SERPL-MCNC: 236 MG/DL (ref 0–200)
CO2 SERPL-SCNC: 27.1 MMOL/L (ref 22–29)
CREAT SERPL-MCNC: 0.71 MG/DL (ref 0.57–1)
CREAT UR-MCNC: 210 MG/DL
EGFRCR SERPLBLD CKD-EPI 2021: 91 ML/MIN/1.73
GLOBULIN SER CALC-MCNC: 2.7 GM/DL
GLUCOSE SERPL-MCNC: 90 MG/DL (ref 65–99)
HBA1C MFR BLD: 5.9 % (ref 4.8–5.6)
HDLC SERPL-MCNC: 49 MG/DL (ref 40–60)
IMP & REVIEW OF LAB RESULTS: NORMAL
LDLC SERPL CALC-MCNC: 143 MG/DL (ref 0–100)
MICROALBUMIN UR-MCNC: 35.8 UG/ML
POTASSIUM SERPL-SCNC: 5.1 MMOL/L (ref 3.5–5.2)
PROT SERPL-MCNC: 7.5 G/DL (ref 6–8.5)
SODIUM SERPL-SCNC: 143 MMOL/L (ref 136–145)
T4 FREE SERPL-MCNC: 1.36 NG/DL (ref 0.93–1.7)
TRIGL SERPL-MCNC: 241 MG/DL (ref 0–150)
TSH SERPL DL<=0.005 MIU/L-ACNC: 0.72 UIU/ML (ref 0.27–4.2)
VLDLC SERPL CALC-MCNC: 44 MG/DL (ref 5–40)

## 2023-03-21 ENCOUNTER — OFFICE VISIT (OUTPATIENT)
Dept: ENDOCRINOLOGY | Age: 73
End: 2023-03-21
Payer: MEDICARE

## 2023-03-21 VITALS
WEIGHT: 191.8 LBS | DIASTOLIC BLOOD PRESSURE: 80 MMHG | TEMPERATURE: 97.3 F | OXYGEN SATURATION: 99 % | HEART RATE: 63 BPM | HEIGHT: 65 IN | BODY MASS INDEX: 31.96 KG/M2 | SYSTOLIC BLOOD PRESSURE: 126 MMHG

## 2023-03-21 DIAGNOSIS — E11.40 CONTROLLED TYPE 2 DIABETES MELLITUS WITH DIABETIC NEUROPATHY, WITHOUT LONG-TERM CURRENT USE OF INSULIN: Primary | ICD-10-CM

## 2023-03-21 DIAGNOSIS — E78.2 MIXED HYPERLIPIDEMIA: ICD-10-CM

## 2023-03-21 DIAGNOSIS — I10 PRIMARY HYPERTENSION: ICD-10-CM

## 2023-03-21 DIAGNOSIS — E55.9 VITAMIN D DEFICIENCY: ICD-10-CM

## 2023-03-21 DIAGNOSIS — E03.9 ACQUIRED HYPOTHYROIDISM: ICD-10-CM

## 2023-03-21 PROCEDURE — 3044F HG A1C LEVEL LT 7.0%: CPT | Performed by: NURSE PRACTITIONER

## 2023-03-21 PROCEDURE — 99214 OFFICE O/P EST MOD 30 MIN: CPT | Performed by: NURSE PRACTITIONER

## 2023-03-21 PROCEDURE — 3079F DIAST BP 80-89 MM HG: CPT | Performed by: NURSE PRACTITIONER

## 2023-03-21 PROCEDURE — 3074F SYST BP LT 130 MM HG: CPT | Performed by: NURSE PRACTITIONER

## 2023-03-21 NOTE — PROGRESS NOTES
Chief Complaint  Chief Complaint   Patient presents with   • Diabetes     Type2: Patient doesn't have meter with her today and states that she doesn't check regularly she has no hx of retinopathy with a mild case of neuropathy in her feet        Subjective          History of Present Illness    Jose Keys 72 y.o.  presents for a follow-up evaluation for type 2 DM     She has been diabetic since 20 years ago.     Pt is on the following medications for their DM: Metformin  mg 1 tablets daily.     More than twice a day, if will give severe diarrhea.         Pt complains of intermittent diarrhea, but better (due to wheat allergy), and numbness and tingling in feet    Denies constipation, chest pain, shortness of breath and vision changes    Pt does not have a history of DM retinopathy.  Last eye exam was 08/22  History of cataract surgery.     Pt does not have a history of nephropathy.  Patient is currently taking ARB     Pt does have neuropathy.  Current takes gabapentin 300 mg BID for this condition, which is prescribed by Dr. James Lau.     Pt does not have a history of CAD or CVA.  Does see cardiology annually    Last A1C in 03/23 was 5.9    Last microalbumin in 03/23 was negative          Blood Sugars    Blood glucoses are not checked          Hypothyroid    PT complains of dry skin and hair breaking    Denies constipation, chest pain, palpitations or cold intolerance.    Current treatment is branded Synthroid 125 mcg daily    Last labs in 03/23 showed TSH 0.722 and FT4 1.36            Hyperlipidemia     Pt denies any muscle/body aches, chest pain or shortness of breath    Pt is currently not taking anything.     Pt has issues with statins in the past - muscle weakness  Zetia causes dizziness and palpitations  Praluent caused anaphylactic symptoms  Nexletol was denied by insurance     Father had HLD    Last lipid panel in 03/23 showed Total 236, HDL 49,  and Triglycerides  "241            Hypertension    Pt denies any chest pain, palpitations, shortness of breath or headache    Current regimen includes cardediolol 3.125 mg BID and amlodipine-valsartan  mg daily           Vitamin D Deficiency     Current treatment includes 5,000 units daily     Last Vit D level was 51.8 in 03/23              I have reviewed the patient's allergies, medicines, past medical hx, family hx and social hx.    Objective   Vital Signs:   /80   Pulse 63   Temp 97.3 °F (36.3 °C) (Temporal)   Ht 165.1 cm (65\")   Wt 87 kg (191 lb 12.8 oz)   SpO2 99%   BMI 31.92 kg/m²       Physical Exam   Physical Exam  Constitutional:       General: She is not in acute distress.     Appearance: Normal appearance. She is not diaphoretic.   HENT:      Head: Normocephalic and atraumatic.   Eyes:      General:         Right eye: No discharge.         Left eye: No discharge.   Skin:     General: Skin is warm and dry.   Neurological:      Mental Status: She is alert.   Psychiatric:         Mood and Affect: Mood normal.         Behavior: Behavior normal.                    Results Review:   Hemoglobin A1C   Date Value Ref Range Status   03/07/2023 5.90 (H) 4.80 - 5.60 % Final     Comment:     Hemoglobin A1C Ranges:  Increased Risk for Diabetes  5.7% to 6.4%  Diabetes                     >= 6.5%  Diabetic Goal                < 7.0%       Triglycerides   Date Value Ref Range Status   03/07/2023 241 (H) 0 - 150 mg/dL Final     HDL Cholesterol   Date Value Ref Range Status   03/07/2023 49 40 - 60 mg/dL Final     LDL Chol Calc (NIH)   Date Value Ref Range Status   03/07/2023 143 (H) 0 - 100 mg/dL Final     VLDL Cholesterol Kyler   Date Value Ref Range Status   03/07/2023 44 (H) 5 - 40 mg/dL Final         Assessment and Plan {CC Problem List  Visit Diagnosis  ROS  Review (Popup)  Health Maintenance  Quality  BestPractice  Medications  SmartSets  SnapShot Encounters  Media :23  Diagnoses and all orders for this " visit:    1. Controlled type 2 diabetes mellitus with diabetic neuropathy, without long-term current use of insulin (HCC) (Primary)  -     Hemoglobin A1c; Future  -     Comprehensive Metabolic Panel; Future    A1c is great at 5.9%  Continue with Metformin  mg 1 tablets daily        2. Acquired hypothyroidism  -     TSH Rfx On Abnormal To Free T4; Future    Thyroid labs are good.    Continue with branded Synthroid 125 mcg daily      3. Mixed hyperlipidemia  -     Comprehensive Metabolic Panel; Future  -     Lipid Panel; Future    Total cholesterol, triglycerides and LDL are all elevated  Dietary modification  Unable to take medication for cholesterol      4. Primary hypertension  -     Comprehensive Metabolic Panel; Future    Stable  Continue with current medication regimen  Defer management to PCP          5. Vitamin D deficiency    Vitamin D levels are good.    Continue with supplement           No refills needed at this time      RTC on 07/25/23 with Dr. Loera, labs prior - needs lab appointment        Follow Up     Patient was given instructions and counseling regarding her condition or for health maintenance advice. Please see specific information pulled into the AVS if appropriate.              Marylu Harry, SHASHI  03/21/23

## 2023-04-05 ENCOUNTER — TELEMEDICINE (OUTPATIENT)
Dept: FAMILY MEDICINE CLINIC | Facility: CLINIC | Age: 73
End: 2023-04-05
Payer: MEDICARE

## 2023-04-05 VITALS — WEIGHT: 190 LBS | BODY MASS INDEX: 31.65 KG/M2 | HEIGHT: 65 IN

## 2023-04-05 DIAGNOSIS — J06.9 ACUTE URI: Primary | ICD-10-CM

## 2023-04-05 PROCEDURE — 99214 OFFICE O/P EST MOD 30 MIN: CPT | Performed by: FAMILY MEDICINE

## 2023-04-05 PROCEDURE — 1159F MED LIST DOCD IN RCRD: CPT | Performed by: FAMILY MEDICINE

## 2023-04-05 PROCEDURE — 1160F RVW MEDS BY RX/DR IN RCRD: CPT | Performed by: FAMILY MEDICINE

## 2023-04-05 PROCEDURE — 3044F HG A1C LEVEL LT 7.0%: CPT | Performed by: FAMILY MEDICINE

## 2023-04-05 RX ORDER — AMOXICILLIN AND CLAVULANATE POTASSIUM 875; 125 MG/1; MG/1
1 TABLET, FILM COATED ORAL EVERY 12 HOURS SCHEDULED
Qty: 20 TABLET | Refills: 0 | Status: SHIPPED | OUTPATIENT
Start: 2023-04-05

## 2023-04-05 NOTE — PROGRESS NOTES
"Subjective   Jose Keys is a 72 y.o. female.     CC: VV for URI-Sx    History of Present Illness     Pt seen today on a VV reporting a roughly 5 day h/o sinus p/p, cough, congestion, ST, HA, and a fever (broke Saturday, but still 99.5). Took a COVID test today and was NEGATIVE. No dyspnea but having colored sputum.       The following portions of the patient's history were reviewed and updated as appropriate: allergies, current medications, past family history, past medical history, past social history, past surgical history and problem list.    Review of Systems   Constitutional: Negative for activity change, chills and fever.   HENT: Positive for congestion, postnasal drip and sinus pressure.    Respiratory: Positive for cough.    Cardiovascular: Negative for chest pain.   Neurological: Positive for headaches.   Psychiatric/Behavioral: Negative for dysphoric mood.       Ht 165.1 cm (65\")   Wt 86.2 kg (190 lb)   BMI 31.62 kg/m²     Objective   Physical Exam  Constitutional:       General: She is not in acute distress.     Appearance: She is well-developed.   Pulmonary:      Effort: Pulmonary effort is normal.   Neurological:      Mental Status: She is alert and oriented to person, place, and time.   Psychiatric:         Behavior: Behavior normal.         Thought Content: Thought content normal.         Assessment & Plan   Diagnoses and all orders for this visit:    1. Acute URI (Primary)  Comments:  with systemic symptoms  Orders:  -     amoxicillin-clavulanate (Augmentin) 875-125 MG per tablet; Take 1 tablet by mouth Every 12 (Twelve) Hours.  Dispense: 20 tablet; Refill: 0      Spent  13   minutes with chart and interview and consent for this encounter given by the patient.  You have chosen to receive care through a telehealth visit.  Do you consent to use a video/audio connection for your medical care today? Yes  Patient was in their residence when this visit took place and I was in my medical office.       "

## 2023-04-13 ENCOUNTER — TELEPHONE (OUTPATIENT)
Dept: FAMILY MEDICINE CLINIC | Facility: CLINIC | Age: 73
End: 2023-04-13

## 2023-04-13 NOTE — TELEPHONE ENCOUNTER
Caller: Jose Keys    Relationship: Self    Best call back number: [phone]    Who are you requesting to speak with (clinical staff, provider,  specific staff member): CLINICAL STAFF    What was the call regarding: PATIENT STATES SHE WAS ADVISED TO SCHEDULE AN APPOINTMENT WITH AN APRN, BUT OFFICE HAS NOTHING AVAILABLE UNTIL Monday, 04/17/23. HUB SCHEDULED HER WITH DR. RODGERS FOR Monday, BUT SHE IS WONDERING IF THERE IS ANYTHING ELSE SHE CAN DO TO HELP THAT LEFT EAR, UNTIL HER APPOINTMENT.

## 2023-04-13 NOTE — TELEPHONE ENCOUNTER
Left message for pt to call and schedule a follow up with one of the nurse pract. For further evaluation. Thank you

## 2023-04-13 NOTE — TELEPHONE ENCOUNTER
Caller: Jose Keys    Relationship: Self    Best call back number: [phone]    What is the best time to reach you: ANY    Who are you requesting to speak with (clinical staff, provider,  specific staff member): CLINICAL STAFF         What was the call regarding: PATIENT CALLED STATED SHE HAS BEEN TAKING THE ANTIBIOTICS AND HAS 3 DAYS LEFT.  BUT SHE STILL CAN'T HEAR OUT LEFT EAR AND WANTS TO KNOW WHAT ELSE SHE SHOULD DO.  IF HE NEEDS TO SEE HER SHE CAN COME IN.      Do you require a callback: YES

## 2023-04-14 ENCOUNTER — OFFICE VISIT (OUTPATIENT)
Dept: FAMILY MEDICINE CLINIC | Facility: CLINIC | Age: 73
End: 2023-04-14
Payer: MEDICARE

## 2023-04-14 VITALS
WEIGHT: 190 LBS | BODY MASS INDEX: 31.65 KG/M2 | HEART RATE: 74 BPM | TEMPERATURE: 98.6 F | SYSTOLIC BLOOD PRESSURE: 148 MMHG | HEIGHT: 65 IN | DIASTOLIC BLOOD PRESSURE: 88 MMHG | OXYGEN SATURATION: 98 % | RESPIRATION RATE: 16 BRPM

## 2023-04-14 DIAGNOSIS — H66.003 NON-RECURRENT ACUTE SUPPURATIVE OTITIS MEDIA OF BOTH EARS WITHOUT SPONTANEOUS RUPTURE OF TYMPANIC MEMBRANES: Primary | ICD-10-CM

## 2023-04-14 DIAGNOSIS — J06.9 ACUTE URI: ICD-10-CM

## 2023-04-14 DIAGNOSIS — R05.1 ACUTE COUGH: ICD-10-CM

## 2023-04-14 RX ORDER — DOXYCYCLINE HYCLATE 100 MG/1
100 CAPSULE ORAL 2 TIMES DAILY
Qty: 20 CAPSULE | Refills: 0 | Status: SHIPPED | OUTPATIENT
Start: 2023-04-14

## 2023-04-14 RX ORDER — CIPROFLOXACIN AND DEXAMETHASONE 3; 1 MG/ML; MG/ML
4 SUSPENSION/ DROPS AURICULAR (OTIC) 2 TIMES DAILY
Qty: 7.5 ML | Refills: 0 | Status: SHIPPED | OUTPATIENT
Start: 2023-04-14 | End: 2023-04-21

## 2023-04-14 NOTE — PROGRESS NOTES
Chief Complaint  Earache (Pt states she started having some loud buzzing noise in her ear for about 5 days. ), Sore Throat (Pt has been sick for about 2 weeks. Pt took a covid test at home on Tuesday test came back neg. ), and Fatigue    Subjective          Jose presents to [facility] as a 72-year-old female complaining of a 1 week history of ear pain/pressure.  Left >right.  She has had a slight fever 100.4-100.9 relieved by Tylenol.  Pain is 3 out of 10.  No discharge or drainage.  Slight decreased hearing.  She does have a history of otitis media.  She has been on Augmentin 4/5/2023 for acute URI she has 1 day left of her antibiotics  after being seen on telehealth visit with Dr. Lau  She is starting to feel slightly better however still is having slight headache, nonproductive cough with no shortness of breath or wheezing.  Some rhinorrhea, sore throat.  No abdominal pain no nausea vomiting or diarrhea.  She is able to eat and keep down fluids.  No one else around her has been sick        Answers for HPI/ROS submitted by the patient on 4/13/2023  What is the primary reason for your visit?: Ear Pain  Affected ear: left  Chronicity: new  Onset: in the past 7 days  Progression since onset: waxing and waning  Frequency: constantly  Fever: 100.4 - 100.9 F  Fever duration: 3 to 4 days  Pain - numeric: 3/10    No other acute complaint of    The following portions of the patient's history were reviewed and updated as appropriate: allergies, current medications, past family history, past medical history, past social history, past surgical history, and problem list      Review of Systems   Constitutional: Positive for fever. Negative for chills and fatigue.   HENT: Positive for ear pain, hearing loss, rhinorrhea and sore throat. Negative for ear discharge.    Eyes: Negative for visual disturbance.   Respiratory: Positive for cough. Negative for shortness of breath and wheezing.   "  Cardiovascular: Negative for chest pain, palpitations and leg swelling.   Gastrointestinal: Negative for abdominal pain, diarrhea and vomiting.   Musculoskeletal: Negative for neck pain.   Skin: Negative for rash.   Neurological: Negative for dizziness and light-headedness.        Objective   Vital Signs:   Vitals:    04/14/23 1553   BP: 148/88   Pulse: 74   Resp: 16   Temp: 98.6 °F (37 °C)   TempSrc: Oral   SpO2: 98%   Weight: 86.2 kg (190 lb)   Height: 165.1 cm (65\")                Physical Exam  Vitals reviewed.   Constitutional:       General: She is not in acute distress.  HENT:      Right Ear: Hearing, ear canal and external ear normal. A middle ear effusion is present. Tympanic membrane is erythematous. Tympanic membrane is not bulging.      Left Ear: Ear canal and external ear normal. Decreased hearing noted. A middle ear effusion is present. Tympanic membrane is erythematous. Tympanic membrane is not bulging.      Nose: Congestion present.      Right Turbinates: Swollen.      Left Turbinates: Swollen.      Right Sinus: Maxillary sinus tenderness present. No frontal sinus tenderness.      Left Sinus: Maxillary sinus tenderness present. No frontal sinus tenderness.      Mouth/Throat:      Mouth: Mucous membranes are moist.      Pharynx: Oropharynx is clear. No oropharyngeal exudate or posterior oropharyngeal erythema.   Eyes:      Conjunctiva/sclera: Conjunctivae normal.   Neck:      Thyroid: No thyromegaly.      Vascular: No carotid bruit.   Cardiovascular:      Rate and Rhythm: Normal rate and regular rhythm.      Heart sounds: Normal heart sounds.   Pulmonary:      Effort: Pulmonary effort is normal.      Breath sounds: Normal breath sounds.   Neurological:      Mental Status: She is alert.   Psychiatric:         Attention and Perception: Attention normal.         Mood and Affect: Mood normal.          Result Review :     The following data was reviewed by: SHASHI Leon on 04/14/2023:         "   Assessment and Plan    Diagnoses and all orders for this visit:    1. Non-recurrent acute suppurative otitis media of both ears without spontaneous rupture of tympanic membranes (Primary)  Comments:  Take medication as directed  We will refer to ENT if no continued improvement  Keep ears dry and clean  Avoid Q-tips  Orders:  -     doxycycline (VIBRAMYCIN) 100 MG capsule; Take 1 capsule by mouth 2 (Two) Times a Day. For infection  Dispense: 20 capsule; Refill: 0  -     ciprofloxacin-dexamethasone (Ciprodex) 0.3-0.1 % otic suspension; Administer 4 drops into both ears 2 (Two) Times a Day for 7 days.  Dispense: 7.5 mL; Refill: 0    2. Acute URI  Comments:  Finish Augmentin  If continued symptoms or any worsening start doxycycline  Orders:  -     doxycycline (VIBRAMYCIN) 100 MG capsule; Take 1 capsule by mouth 2 (Two) Times a Day. For infection  Dispense: 20 capsule; Refill: 0    3. Acute cough  Comments:  Follow-up with any worsening symptoms or no improvements  Orders:  -     doxycycline (VIBRAMYCIN) 100 MG capsule; Take 1 capsule by mouth 2 (Two) Times a Day. For infection  Dispense: 20 capsule; Refill: 0    Plan:  -Take all medications as prescribed and until completed.  -Monitor for fever and take Tylenol as needed.  Drink plenty of fluids and get plenty of rest.  -Use cool-mist humidifier as needed.  -Seek immediate medical attention for fever unrelieved by Tylenol, chest pain, shortness of breath, sharp back pain, or any other worsening signs or symptoms.    -The patient verbalized understanding of all instructions given today.       Follow Up   Return if symptoms worsen or fail to improve.  Patient was given instructions and counseling regarding her condition or for health maintenance advice. Please see specific information pulled into the AVS if appropriate.

## 2023-05-10 DIAGNOSIS — E11.42 DIABETIC PERIPHERAL NEUROPATHY: Chronic | ICD-10-CM

## 2023-05-11 RX ORDER — GABAPENTIN 300 MG/1
CAPSULE ORAL
Qty: 180 CAPSULE | OUTPATIENT
Start: 2023-05-11

## 2023-06-08 DIAGNOSIS — E11.40 CONTROLLED TYPE 2 DIABETES MELLITUS WITH DIABETIC NEUROPATHY, WITHOUT LONG-TERM CURRENT USE OF INSULIN: ICD-10-CM

## 2023-06-08 RX ORDER — METFORMIN HYDROCHLORIDE 500 MG/1
500 TABLET, EXTENDED RELEASE ORAL DAILY
Qty: 90 TABLET | Refills: 1 | Status: SHIPPED | OUTPATIENT
Start: 2023-06-08

## 2023-07-19 ENCOUNTER — OFFICE (AMBULATORY)
Dept: URBAN - METROPOLITAN AREA CLINIC 76 | Facility: CLINIC | Age: 73
End: 2023-07-19

## 2023-07-19 VITALS
OXYGEN SATURATION: 95 % | HEART RATE: 76 BPM | WEIGHT: 192 LBS | HEIGHT: 65 IN | DIASTOLIC BLOOD PRESSURE: 74 MMHG | SYSTOLIC BLOOD PRESSURE: 138 MMHG

## 2023-07-19 DIAGNOSIS — R10.13 EPIGASTRIC PAIN: ICD-10-CM

## 2023-07-19 DIAGNOSIS — R19.7 DIARRHEA, UNSPECIFIED: ICD-10-CM

## 2023-07-19 DIAGNOSIS — K21.9 GASTRO-ESOPHAGEAL REFLUX DISEASE WITHOUT ESOPHAGITIS: ICD-10-CM

## 2023-07-19 DIAGNOSIS — Z83.71 FAMILY HISTORY OF COLONIC POLYPS: ICD-10-CM

## 2023-07-19 DIAGNOSIS — Z80.0 FAMILY HISTORY OF MALIGNANT NEOPLASM OF DIGESTIVE ORGANS: ICD-10-CM

## 2023-07-19 PROCEDURE — 99204 OFFICE O/P NEW MOD 45 MIN: CPT | Performed by: INTERNAL MEDICINE

## 2023-07-25 ENCOUNTER — OFFICE VISIT (OUTPATIENT)
Dept: ENDOCRINOLOGY | Age: 73
End: 2023-07-25
Payer: MEDICARE

## 2023-07-25 VITALS
WEIGHT: 192.4 LBS | TEMPERATURE: 96.4 F | HEART RATE: 62 BPM | HEIGHT: 65 IN | BODY MASS INDEX: 32.06 KG/M2 | SYSTOLIC BLOOD PRESSURE: 128 MMHG | OXYGEN SATURATION: 98 % | DIASTOLIC BLOOD PRESSURE: 68 MMHG

## 2023-07-25 DIAGNOSIS — M10.9 GOUT, UNSPECIFIED CAUSE, UNSPECIFIED CHRONICITY, UNSPECIFIED SITE: Chronic | ICD-10-CM

## 2023-07-25 DIAGNOSIS — E11.42 TYPE 2 DIABETES MELLITUS WITH PERIPHERAL NEUROPATHY: Primary | ICD-10-CM

## 2023-07-25 DIAGNOSIS — E55.9 VITAMIN D DEFICIENCY: ICD-10-CM

## 2023-07-25 DIAGNOSIS — I10 PRIMARY HYPERTENSION: ICD-10-CM

## 2023-07-25 DIAGNOSIS — E03.9 PRIMARY HYPOTHYROIDISM: ICD-10-CM

## 2023-07-25 DIAGNOSIS — Z78.9 STATIN INTOLERANCE: ICD-10-CM

## 2023-07-25 DIAGNOSIS — E78.2 MIXED HYPERLIPIDEMIA: ICD-10-CM

## 2023-07-25 PROCEDURE — 99214 OFFICE O/P EST MOD 30 MIN: CPT | Performed by: INTERNAL MEDICINE

## 2023-07-25 PROCEDURE — 3074F SYST BP LT 130 MM HG: CPT | Performed by: INTERNAL MEDICINE

## 2023-07-25 PROCEDURE — 3044F HG A1C LEVEL LT 7.0%: CPT | Performed by: INTERNAL MEDICINE

## 2023-07-25 PROCEDURE — 3078F DIAST BP <80 MM HG: CPT | Performed by: INTERNAL MEDICINE

## 2023-07-25 NOTE — LETTER
July 25, 2023     James Lau MD  [id number] [address] [facility]  [address]  [place] [facility] [address]    Patient: Jose Keys   YOB: 1950   Date of Visit: 7/25/2023     Dear James Lau MD:       Thank you for referring Jose Keys to me for evaluation. Below are the relevant portions of my assessment and plan of care.    If you have questions, please do not hesitate to call me. I look forward to following Jose along with you.         Sincerely,        Fredy Loera MD        CC: James M Kammerling, MD Sunana Sohi, MD Yson, Fredy MONACO MD  07/25/23 1220  Sign when Signing Visit  Subjective  Jose Keys is a 73 y.o. female.     History of Present Illness     The following portions of the patient's history were reviewed and updated as appropriate: allergies, current medications, past family history, past medical history, past social history, past surgical history, and problem list.    She has known diabetes mellitus since 2002.  She is on metformin  mg 1 tablet daily.  She does not check her blood sugars at home.  She has no significant weight change since 3/23.  She does not exercise regularly.  Hemoglobin A1c done in July 2023 is 6.1%.     Her last eye examination was in August 2022.  She has no retinopathy.  Urine microalbumin was normal in 3/23.  She has numbness and tingling in her feet and is on gabapentin 300 mg twice a day prescriber Dr. Lau.  She was seen by a neurologist in the past.     She has hypothyroidism and is on Synthroid 125 mcg a day.  She has no previous history of head or neck radiation since treatment or thyroid surgery.  TSH done in July 2023 is normal at 2.82.     She has chronic diarrhea over the past year.  Her last colonoscopy and EGD were done by Dr. Kaiser  in 2018.  She had a rectal polyp removed.  She has history of wheat allergy which causes itching.  She has burning sensation when she swallows.  She is on Prilosec and Tums as needed. She is being scheduled for a  "colonoscopy and EGD by Dr. Kaiser.     She has hyperlipidemia and is on diet alone.  She had muscle pain with atorvastatin, simvastatin and Zetia before.  She had leg edema with Praluent. She has not used Repatha before.  Her insurance will not cover Nexletol.  Her 10-year risk of heart disease or stroke using American Heart Association calculator is elevated at 29.1%.     She has hypertension and is on Coreg 3.125 twice daily and amlodipine valsartan 10/320 mg daily.  She has no history of heart attack or stroke.  She denies chest pain.  She follows with Dr. Kammerling.     She has history of vitamin D deficiency and in on multivitamins 1 tab daily.     She has history of gout and is on allopurinol 100 mg 1 tablet every other day.  She denies history of nephrolithiasis.        Review of Systems   Eyes:  Negative for visual disturbance.   Respiratory:  Positive for shortness of breath.    Cardiovascular:  Negative for chest pain and palpitations.   Gastrointestinal:  Positive for abdominal pain, constipation and diarrhea. Negative for anal bleeding and blood in stool.   Genitourinary:  Positive for frequency and urgency.        Intermittent incontinence   Neurological:  Positive for numbness.   Vitals:    07/25/23 1102   BP: 128/68   Pulse: 62   Temp: 96.4 °F (35.8 °C)   TempSrc: Temporal   SpO2: 98%   Weight: 87.3 kg (192 lb 6.4 oz)   Height: 165.1 cm (65\")      Objective  Physical Exam  Constitutional:       General: She is not in acute distress.     Appearance: Normal appearance. She is not ill-appearing or toxic-appearing.   Eyes:      General: No scleral icterus.        Right eye: No discharge.         Left eye: No discharge.      Extraocular Movements: Extraocular movements intact.      Conjunctiva/sclera: Conjunctivae normal.   Neck:      Vascular: No carotid bruit.   Cardiovascular:      Rate and Rhythm: Normal rate and regular rhythm.      Heart sounds: Normal heart sounds.   Pulmonary:      Effort: No " respiratory distress.      Breath sounds: Normal breath sounds. No stridor.   Abdominal:      General: Bowel sounds are normal. There is no distension.      Palpations: Abdomen is soft. There is no mass.      Tenderness: There is no right CVA tenderness or left CVA tenderness.   Lymphadenopathy:      Cervical: No cervical adenopathy.   Skin:     General: Skin is warm and dry.   Neurological:      Mental Status: She is alert and oriented to person, place, and time.      Comments: Intact light touch on lower ext     Results Encounter on 07/21/2023   Component Date Value Ref Range Status   • Hemoglobin A1C 07/18/2023 6.10 (H)  4.80 - 5.60 % Final    Comment: Hemoglobin A1C Ranges:  Increased Risk for Diabetes  5.7% to 6.4%  Diabetes                     >= 6.5%  Diabetic Goal                < 7.0%     • Glucose 07/18/2023 88  65 - 99 mg/dL Final   • BUN 07/18/2023 12  8 - 23 mg/dL Final   • Creatinine 07/18/2023 0.81  0.57 - 1.00 mg/dL Final   • EGFR Result 07/18/2023 77.2  >60.0 mL/min/1.73 Final    Comment: GFR Normal >60  Chronic Kidney Disease <60  Kidney Failure <15  The GFR formula is only valid for adults with stable renal  function between ages 18 and 70.     • BUN/Creatinine Ratio 07/18/2023 14.8  7.0 - 25.0 Final   • Sodium 07/18/2023 144  136 - 145 mmol/L Final   • Potassium 07/18/2023 4.9  3.5 - 5.2 mmol/L Final   • Chloride 07/18/2023 104  98 - 107 mmol/L Final   • Total CO2 07/18/2023 26.9  22.0 - 29.0 mmol/L Final   • Calcium 07/18/2023 9.6  8.6 - 10.5 mg/dL Final   • Total Protein 07/18/2023 6.8  6.0 - 8.5 g/dL Final   • Albumin 07/18/2023 4.6  3.5 - 5.2 g/dL Final   • Globulin 07/18/2023 2.2  gm/dL Final   • A/G Ratio 07/18/2023 2.1  g/dL Final   • Total Bilirubin 07/18/2023 0.2  0.0 - 1.2 mg/dL Final   • Alkaline Phosphatase 07/18/2023 71  39 - 117 U/L Final   • AST (SGOT) 07/18/2023 18  1 - 32 U/L Final   • ALT (SGPT) 07/18/2023 29  1 - 33 U/L Final   • Total Cholesterol 07/18/2023 238 (H)  0 - 200  mg/dL Final    Comment: Cholesterol Reference Ranges  (U.S. Department of Health and Human Services ATP III  Classifications)  Desirable          <200 mg/dL  Borderline High    200-239 mg/dL  High Risk          >240 mg/dL  Triglyceride Reference Ranges  (U.S. Department of Health and Human Services ATP III  Classifications)  Normal           <150 mg/dL  Borderline High  150-199 mg/dL  High             200-499 mg/dL  Very High        >500 mg/dL  HDL Reference Ranges  (U.S. Department of Health and Human Services ATP III  Classifications)  Low     <40 mg/dl (major risk factor for CHD)  High    >60 mg/dl ('negative' risk factor for CHD)  LDL Reference Ranges  (U.S. Department of Health and Human Services ATP III  Classifications)  Optimal          <100 mg/dL  Near Optimal     100-129 mg/dL  Borderline High  130-159 mg/dL  High             160-189 mg/dL  Very High        >189 mg/dL     • Triglycerides 07/18/2023 226 (H)  0 - 150 mg/dL Final   • HDL Cholesterol 07/18/2023 49  40 - 60 mg/dL Final   • VLDL Cholesterol Kyler 07/18/2023 41 (H)  5 - 40 mg/dL Final   • LDL Chol Calc ([facility]) 07/18/2023 148 (H)  0 - 100 mg/dL Final   • TSH 07/18/2023 2.820  0.270 - 4.200 uIU/mL Final   • Interpretation 07/18/2023 Note   Final    Supplemental report is available.     Assessment & Plan  Diagnoses and all orders for this visit:    1. Type 2 diabetes mellitus with peripheral neuropathy (Primary)    2. Primary hypothyroidism    3. Mixed hyperlipidemia    4. Primary hypertension    5. Vitamin D deficiency    6. Statin intolerance    7. Gout, unspecified cause, unspecified chronicity, unspecified site      Continue metformin  mg once a day.  Continue gabapentin per Dr. Ch.  Continue Synthroid 125 mcg/day.  Follow-up with Dr. Kaiser.  Start Leqvio 284 mg initially then at 3 months and then every 6 months thereafter.  ACU referral sent.  Continue multivitamins 1 tablet/day.  Continue Coreg and amlodipine valsartan.  Patient advised  to see Dr. Kammerling for evaluation of exertional dyspnea.  Continue allopurinol per Dr. Lau.    Copy of my note sent to Dr. Lau, Dr. Kaiser, and Dr. Kammerling.    RTC  6 mos.

## 2023-07-25 NOTE — PROGRESS NOTES
Subjective   Jose Keys is a 73 y.o. female.     History of Present Illness     The following portions of the patient's history were reviewed and updated as appropriate: allergies, current medications, past family history, past medical history, past social history, past surgical history, and problem list.    She has known diabetes mellitus since 2002.  She is on metformin  mg 1 tablet daily.  She does not check her blood sugars at home.  She has no significant weight change since 3/23.  She does not exercise regularly.  Hemoglobin A1c done in July 2023 is 6.1%.     Her last eye examination was in August 2022.  She has no retinopathy.  Urine microalbumin was normal in 3/23.  She has numbness and tingling in her feet and is on gabapentin 300 mg twice a day prescriber Dr. Lau.  She was seen by a neurologist in the past.     She has hypothyroidism and is on Synthroid 125 mcg a day.  She has no previous history of head or neck radiation since treatment or thyroid surgery.  TSH done in July 2023 is normal at 2.82.     She has chronic diarrhea over the past year.  Her last colonoscopy and EGD were done by Dr. Kaiser  in 2018.  She had a rectal polyp removed.  She has history of wheat allergy which causes itching.  She has burning sensation when she swallows.  She is on Prilosec and Tums as needed. She is being scheduled for a colonoscopy and EGD by Dr. Kaiser.     She has hyperlipidemia and is on diet alone.  She had muscle pain with atorvastatin, simvastatin and Zetia before.  She had leg edema with Praluent. She has not used Repatha before.  Her insurance will not cover Nexletol.  Her 10-year risk of heart disease or stroke using American Heart Association calculator is elevated at 29.1%.     She has hypertension and is on Coreg 3.125 twice daily and amlodipine valsartan 10/320 mg daily.  She has no history of heart attack or stroke.  She denies chest pain.  She follows with Dr. Kammerling.     She has history of  "vitamin D deficiency and in on multivitamins 1 tab daily.     She has history of gout and is on allopurinol 100 mg 1 tablet every other day.  She denies history of nephrolithiasis.        Review of Systems   Eyes:  Negative for visual disturbance.   Respiratory:  Positive for shortness of breath.    Cardiovascular:  Negative for chest pain and palpitations.   Gastrointestinal:  Positive for abdominal pain, constipation and diarrhea. Negative for anal bleeding and blood in stool.   Genitourinary:  Positive for frequency and urgency.        Intermittent incontinence   Neurological:  Positive for numbness.   Vitals:    07/25/23 1102   BP: 128/68   Pulse: 62   Temp: 96.4 °F (35.8 °C)   TempSrc: Temporal   SpO2: 98%   Weight: 87.3 kg (192 lb 6.4 oz)   Height: 165.1 cm (65\")      Objective   Physical Exam  Constitutional:       General: She is not in acute distress.     Appearance: Normal appearance. She is not ill-appearing or toxic-appearing.   Eyes:      General: No scleral icterus.        Right eye: No discharge.         Left eye: No discharge.      Extraocular Movements: Extraocular movements intact.      Conjunctiva/sclera: Conjunctivae normal.   Neck:      Vascular: No carotid bruit.   Cardiovascular:      Rate and Rhythm: Normal rate and regular rhythm.      Heart sounds: Normal heart sounds.   Pulmonary:      Effort: No respiratory distress.      Breath sounds: Normal breath sounds. No stridor.   Abdominal:      General: Bowel sounds are normal. There is no distension.      Palpations: Abdomen is soft. There is no mass.      Tenderness: There is no right CVA tenderness or left CVA tenderness.   Lymphadenopathy:      Cervical: No cervical adenopathy.   Skin:     General: Skin is warm and dry.   Neurological:      Mental Status: She is alert and oriented to person, place, and time.      Comments: Intact light touch on lower ext     Results Encounter on 07/21/2023   Component Date Value Ref Range Status    " Hemoglobin A1C 07/18/2023 6.10 (H)  4.80 - 5.60 % Final    Comment: Hemoglobin A1C Ranges:  Increased Risk for Diabetes  5.7% to 6.4%  Diabetes                     >= 6.5%  Diabetic Goal                < 7.0%      Glucose 07/18/2023 88  65 - 99 mg/dL Final    BUN 07/18/2023 12  8 - 23 mg/dL Final    Creatinine 07/18/2023 0.81  0.57 - 1.00 mg/dL Final    EGFR Result 07/18/2023 77.2  >60.0 mL/min/1.73 Final    Comment: GFR Normal >60  Chronic Kidney Disease <60  Kidney Failure <15  The GFR formula is only valid for adults with stable renal  function between ages 18 and 70.      BUN/Creatinine Ratio 07/18/2023 14.8  7.0 - 25.0 Final    Sodium 07/18/2023 144  136 - 145 mmol/L Final    Potassium 07/18/2023 4.9  3.5 - 5.2 mmol/L Final    Chloride 07/18/2023 104  98 - 107 mmol/L Final    Total CO2 07/18/2023 26.9  22.0 - 29.0 mmol/L Final    Calcium 07/18/2023 9.6  8.6 - 10.5 mg/dL Final    Total Protein 07/18/2023 6.8  6.0 - 8.5 g/dL Final    Albumin 07/18/2023 4.6  3.5 - 5.2 g/dL Final    Globulin 07/18/2023 2.2  gm/dL Final    A/G Ratio 07/18/2023 2.1  g/dL Final    Total Bilirubin 07/18/2023 0.2  0.0 - 1.2 mg/dL Final    Alkaline Phosphatase 07/18/2023 71  39 - 117 U/L Final    AST (SGOT) 07/18/2023 18  1 - 32 U/L Final    ALT (SGPT) 07/18/2023 29  1 - 33 U/L Final    Total Cholesterol 07/18/2023 238 (H)  0 - 200 mg/dL Final    Comment: Cholesterol Reference Ranges  (U.S. Department of Health and Human Services ATP III  Classifications)  Desirable          <200 mg/dL  Borderline High    200-239 mg/dL  High Risk          >240 mg/dL  Triglyceride Reference Ranges  (U.S. Department of Health and Human Services ATP III  Classifications)  Normal           <150 mg/dL  Borderline High  150-199 mg/dL  High             200-499 mg/dL  Very High        >500 mg/dL  HDL Reference Ranges  (U.S. Department of Health and Human Services ATP III  Classifications)  Low     <40 mg/dl (major risk factor for CHD)  High    >60 mg/dl  ('negative' risk factor for CHD)  LDL Reference Ranges  (U.S. Department of Health and Human Services ATP III  Classifications)  Optimal          <100 mg/dL  Near Optimal     100-129 mg/dL  Borderline High  130-159 mg/dL  High             160-189 mg/dL  Very High        >189 mg/dL      Triglycerides 07/18/2023 226 (H)  0 - 150 mg/dL Final    HDL Cholesterol 07/18/2023 49  40 - 60 mg/dL Final    VLDL Cholesterol Kyler 07/18/2023 41 (H)  5 - 40 mg/dL Final    LDL Chol Calc (NIH) 07/18/2023 148 (H)  0 - 100 mg/dL Final    TSH 07/18/2023 2.820  0.270 - 4.200 uIU/mL Final    Interpretation 07/18/2023 Note   Final    Supplemental report is available.     Assessment & Plan   Diagnoses and all orders for this visit:    1. Type 2 diabetes mellitus with peripheral neuropathy (Primary)    2. Primary hypothyroidism    3. Mixed hyperlipidemia    4. Primary hypertension    5. Vitamin D deficiency    6. Statin intolerance    7. Gout, unspecified cause, unspecified chronicity, unspecified site      Continue metformin  mg once a day.  Continue gabapentin per Dr. Ch.  Continue Synthroid 125 mcg/day.  Follow-up with Dr. Kaiser.  Start Leqvio 284 mg initially then at 3 months and then every 6 months thereafter.  ACU referral sent.  Continue multivitamins 1 tablet/day.  Continue Coreg and amlodipine valsartan.  Patient advised to see Dr. Kammerling for evaluation of exertional dyspnea.  Continue allopurinol per Dr. Lau.    Copy of my note sent to Dr. Lau, Dr. Kaiser, and Dr. Kammerling.    RTC  6 mos.

## 2023-07-28 ENCOUNTER — OFFICE (AMBULATORY)
Dept: URBAN - METROPOLITAN AREA LAB 2 | Facility: LAB | Age: 73
End: 2023-07-28

## 2023-07-28 VITALS
HEART RATE: 61 BPM | TEMPERATURE: 97.6 F | RESPIRATION RATE: 18 BRPM | HEART RATE: 58 BPM | RESPIRATION RATE: 16 BRPM | HEART RATE: 60 BPM | DIASTOLIC BLOOD PRESSURE: 76 MMHG | HEART RATE: 54 BPM | DIASTOLIC BLOOD PRESSURE: 74 MMHG | TEMPERATURE: 97.2 F | HEART RATE: 56 BPM | SYSTOLIC BLOOD PRESSURE: 149 MMHG | DIASTOLIC BLOOD PRESSURE: 95 MMHG | OXYGEN SATURATION: 96 % | SYSTOLIC BLOOD PRESSURE: 142 MMHG | HEIGHT: 65 IN | OXYGEN SATURATION: 99 % | DIASTOLIC BLOOD PRESSURE: 75 MMHG | SYSTOLIC BLOOD PRESSURE: 139 MMHG | SYSTOLIC BLOOD PRESSURE: 124 MMHG | RESPIRATION RATE: 12 BRPM | SYSTOLIC BLOOD PRESSURE: 144 MMHG | WEIGHT: 192 LBS | DIASTOLIC BLOOD PRESSURE: 71 MMHG | RESPIRATION RATE: 10 BRPM | SYSTOLIC BLOOD PRESSURE: 161 MMHG | SYSTOLIC BLOOD PRESSURE: 146 MMHG | RESPIRATION RATE: 15 BRPM | DIASTOLIC BLOOD PRESSURE: 70 MMHG | SYSTOLIC BLOOD PRESSURE: 155 MMHG | OXYGEN SATURATION: 97 % | HEART RATE: 53 BPM | RESPIRATION RATE: 19 BRPM | DIASTOLIC BLOOD PRESSURE: 68 MMHG

## 2023-07-28 DIAGNOSIS — A08.11 ACUTE GASTROENTEROPATHY DUE TO NORWALK AGENT: ICD-10-CM

## 2023-07-28 PROCEDURE — 87505 NFCT AGENT DETECTION GI: CPT | Performed by: INTERNAL MEDICINE

## 2023-08-02 ENCOUNTER — AMBULATORY SURGICAL CENTER (AMBULATORY)
Dept: URBAN - METROPOLITAN AREA SURGERY 17 | Facility: SURGERY | Age: 73
End: 2023-08-02

## 2023-08-02 ENCOUNTER — OFFICE (AMBULATORY)
Dept: URBAN - METROPOLITAN AREA PATHOLOGY 4 | Facility: PATHOLOGY | Age: 73
End: 2023-08-02

## 2023-08-02 DIAGNOSIS — K21.9 GASTRO-ESOPHAGEAL REFLUX DISEASE WITHOUT ESOPHAGITIS: ICD-10-CM

## 2023-08-02 DIAGNOSIS — R19.7 DIARRHEA, UNSPECIFIED: ICD-10-CM

## 2023-08-02 DIAGNOSIS — K62.1 RECTAL POLYP: ICD-10-CM

## 2023-08-02 DIAGNOSIS — Z83.71 FAMILY HISTORY OF COLONIC POLYPS: ICD-10-CM

## 2023-08-02 DIAGNOSIS — Z80.0 FAMILY HISTORY OF MALIGNANT NEOPLASM OF DIGESTIVE ORGANS: ICD-10-CM

## 2023-08-02 DIAGNOSIS — R10.13 EPIGASTRIC PAIN: ICD-10-CM

## 2023-08-02 LAB
GI HISTOLOGY: A. UNSPECIFIED: (no result)
GI HISTOLOGY: B. SELECT: (no result)
GI HISTOLOGY: C. UNSPECIFIED: (no result)
GI HISTOLOGY: D. SELECT: (no result)
GI HISTOLOGY: E. UNSPECIFIED: (no result)
GI HISTOLOGY: PDF REPORT: (no result)

## 2023-08-02 PROCEDURE — 43239 EGD BIOPSY SINGLE/MULTIPLE: CPT | Performed by: INTERNAL MEDICINE

## 2023-08-02 PROCEDURE — 45380 COLONOSCOPY AND BIOPSY: CPT | Performed by: INTERNAL MEDICINE

## 2023-08-02 PROCEDURE — 88342 IMHCHEM/IMCYTCHM 1ST ANTB: CPT | Performed by: INTERNAL MEDICINE

## 2023-08-02 PROCEDURE — 88305 TISSUE EXAM BY PATHOLOGIST: CPT | Performed by: INTERNAL MEDICINE

## 2023-08-08 ENCOUNTER — SPECIALTY PHARMACY (OUTPATIENT)
Dept: ENDOCRINOLOGY | Age: 73
End: 2023-08-08
Payer: MEDICARE

## 2023-08-08 DIAGNOSIS — E78.2 MIXED HYPERLIPIDEMIA: Primary | ICD-10-CM

## 2023-08-08 RX ORDER — INCLISIRAN 284 MG/1.5ML
284 INJECTION, SOLUTION SUBCUTANEOUS
COMMUNITY

## 2023-08-08 RX ORDER — DIPHENHYDRAMINE HYDROCHLORIDE 50 MG/ML
50 INJECTION INTRAMUSCULAR; INTRAVENOUS AS NEEDED
OUTPATIENT
Start: 2023-08-08

## 2023-08-08 RX ORDER — MEPERIDINE HYDROCHLORIDE 25 MG/ML
25 INJECTION INTRAMUSCULAR; INTRAVENOUS; SUBCUTANEOUS AS NEEDED
OUTPATIENT
Start: 2023-08-08

## 2023-08-08 RX ORDER — FAMOTIDINE 10 MG/ML
20 INJECTION, SOLUTION INTRAVENOUS AS NEEDED
OUTPATIENT
Start: 2023-08-08

## 2023-08-08 NOTE — PROGRESS NOTES
Specialty Pharmacy Patient Management Program  One-Time Clinical Outreach     Jose Keys is a 73 y.o. female seen by an Endocrinology provider for Type 2 Diabetes and Hyperlipidemia.      Pt to be started on Leqvio injections for hyperlipidemia. Per d/w Dr. Loera, therapy plan entered for Leqvio induction at [facility] 8/8.    Lluvia Weaver, PharmD, BCPS, BCCCP  Clinical Specialty Pharmacist, Endocrinology  8/8/2023  08:23 EDT

## 2023-08-08 NOTE — PROGRESS NOTES
Chief Complaint:   Chief Complaint   Patient presents with    Hypertension    Arthritis              Jose Keys 73 y.o. female who presents today for Medical Management of the below listed issues. She  has a problem list of   Patient Active Problem List   Diagnosis    Hyperuricemia    Diabetic peripheral neuropathy    Fatigue    Hyperlipidemia    Essential hypertension    Primary hypothyroidism    Menopause present    Proteinuria    Vitamin D deficiency    Anxiety    Type 2 diabetes mellitus    LFT elevation    Neuropathy    Type 2 diabetes mellitus with peripheral neuropathy    Vaginal atrophy    Gout    Statin intolerance   .  Since the last visit, She has overall felt well.  she has been compliant with   Current Outpatient Medications:     allopurinol (Zyloprim) 100 MG tablet, Take 1 tablet by mouth Daily., Disp: 90 tablet, Rfl: 3    amLODIPine-valsartan (EXFORGE)  MG per tablet, Take 1 tablet by mouth Daily., Disp: 90 tablet, Rfl: 3    gabapentin (NEURONTIN) 300 MG capsule, Take 1 capsule by mouth 2 (Two) Times a Day., Disp: 180 capsule, Rfl: 1    ACCU-CHEK FASTCLIX LANCETS [facility] [place], Use to test BG 2x daily. DX CODE: E11.65, Disp: 200 each, Rfl: 1    buPROPion XL (WELLBUTRIN XL) 300 MG 24 hr tablet, Take  by mouth Daily., Disp: , Rfl: 2    carvedilol (COREG) 3.125 MG tablet, Take 1 tablet by mouth 2 (Two) Times a Day With Meals., Disp: 180 tablet, Rfl: 1    cetirizine (zyrTEC) 10 MG tablet, Take 1 tablet by mouth Daily., Disp: , Rfl:     escitalopram (LEXAPRO) 10 MG tablet, Take  by mouth Daily., Disp: , Rfl: 2    glucose blood (ACCU-CHEK SMARTVIEW) test strip, Use to test BG 2x daily, Disp: 200 each, Rfl: 1    Inclisiran Sodium (Leqvio) 284 MG/1.5ML solution prefilled syringe, Inject 1.5 mL under the skin into the appropriate area as directed Every 6 (Six) Months. 284mg every 3 months x 2 doses, followed by 284mg every 6 months., Disp: , Rfl:     Lancet Devices (ACCU-CHEK SOFTCLIX) lancets, Use as  "instructed, Disp: 1 each, Rfl: 0    metFORMIN ER (GLUCOPHAGE-XR) 500 MG 24 hr tablet, TAKE 1 TABLET BY MOUTH DAILY, Disp: 90 tablet, Rfl: 1    montelukast (SINGULAIR) 10 MG tablet, Take 1 tablet by mouth Daily., Disp: , Rfl: 0    Multiple Vitamins-Minerals (MULTIVITAMIN PO), Take 1 tablet by mouth Daily., Disp: , Rfl:     rosuvastatin (Crestor) 5 MG tablet, Take 1 tablet by mouth Daily., Disp: 90 tablet, Rfl: 3    Synthroid 125 MCG tablet, TAKE 1 TABLET BY MOUTH DAILY, Disp: 90 tablet, Rfl: 1.  She denies medication side effects.    All of the other chronic condition(s) listed above are stable w/o issues.    /76   Pulse 66   Temp 97.8 øF (36.6 øC) (Oral)   Resp 14   Ht 165.1 cm (65\")   Wt 85.7 kg (189 lb)   SpO2 97%   BMI 31.45 kg/mý     Results for orders placed or performed in visit on 07/21/23   Hemoglobin A1c    Specimen: Blood   Result Value Ref Range    Hemoglobin A1C 6.10 (H) 4.80 - 5.60 %   Comprehensive Metabolic Panel    Specimen: Blood   Result Value Ref Range    Glucose 88 65 - 99 mg/dL    BUN 12 8 - 23 mg/dL    Creatinine 0.81 0.57 - 1.00 mg/dL    EGFR Result 77.2 >60.0 mL/min/1.73    BUN/Creatinine Ratio 14.8 7.0 - 25.0    Sodium 144 136 - 145 mmol/L    Potassium 4.9 3.5 - 5.2 mmol/L    Chloride 104 98 - 107 mmol/L    Total CO2 26.9 22.0 - 29.0 mmol/L    Calcium 9.6 8.6 - 10.5 mg/dL    Total Protein 6.8 6.0 - 8.5 g/dL    Albumin 4.6 3.5 - 5.2 g/dL    Globulin 2.2 gm/dL    A/G Ratio 2.1 g/dL    Total Bilirubin 0.2 0.0 - 1.2 mg/dL    Alkaline Phosphatase 71 39 - 117 U/L    AST (SGOT) 18 1 - 32 U/L    ALT (SGPT) 29 1 - 33 U/L   Lipid Panel    Specimen: Blood   Result Value Ref Range    Total Cholesterol 238 (H) 0 - 200 mg/dL    Triglycerides 226 (H) 0 - 150 mg/dL    HDL Cholesterol 49 40 - 60 mg/dL    VLDL Cholesterol Kyler 41 (H) 5 - 40 mg/dL    LDL Chol Calc (NIH) 148 (H) 0 - 100 mg/dL   TSH Rfx On Abnormal To Free T4    Specimen: Blood   Result Value Ref Range    TSH 2.820 0.270 - 4.200 " uIU/mL   Cardiovascular Risk Assessment   Result Value Ref Range    Interpretation Note              The following portions of the patient's history were reviewed and updated as appropriate: allergies, current medications, past family history, past medical history, past social history, past surgical history, and problem list.    Review of Systems   Constitutional:  Negative for activity change, chills and fever.   Respiratory:  Negative for cough.    Cardiovascular:  Negative for chest pain.   Psychiatric/Behavioral:  Negative for dysphoric mood.      Objective              Physical Exam  Constitutional:       General: She is not in acute distress.     Appearance: She is well-developed.   Cardiovascular:      Rate and Rhythm: Normal rate and regular rhythm.   Pulmonary:      Effort: Pulmonary effort is normal.      Breath sounds: Normal breath sounds.   Neurological:      Mental Status: She is alert and oriented to person, place, and time.   Psychiatric:         Behavior: Behavior normal.         Thought Content: Thought content normal.   Endocrinologist independently reviewed by me at today's visit.        Diagnoses and all orders for this visit:    1. Essential hypertension (Primary)  -     amLODIPine-valsartan (EXFORGE)  MG per tablet; Take 1 tablet by mouth Daily.  Dispense: 90 tablet; Refill: 3    2. Gout, unspecified cause, unspecified chronicity, unspecified site  -     allopurinol (Zyloprim) 100 MG tablet; Take 1 tablet by mouth Daily.  Dispense: 90 tablet; Refill: 3    3. Diabetic peripheral neuropathy  -     gabapentin (NEURONTIN) 300 MG capsule; Take 1 capsule by mouth 2 (Two) Times a Day.  Dispense: 180 capsule; Refill: 1    4. Mixed hyperlipidemia  -     rosuvastatin (Crestor) 5 MG tablet; Take 1 tablet by mouth Daily.  Dispense: 90 tablet; Refill: 3    In discussion about her lipid panel, patient understands that her LDL and triglycerides both are not to goal, however she has had difficulty with  myalgias related to statins.  However, she has never taken co-Q10 with this and is willing to try a low-dose Crestor with daily co-Q10 to see if its become intolerable to take.  Also recommended over-the-counter fish oil.

## 2023-08-09 ENCOUNTER — OFFICE VISIT (OUTPATIENT)
Dept: FAMILY MEDICINE CLINIC | Facility: CLINIC | Age: 73
End: 2023-08-09
Payer: MEDICARE

## 2023-08-09 VITALS
SYSTOLIC BLOOD PRESSURE: 134 MMHG | HEIGHT: 65 IN | WEIGHT: 189 LBS | RESPIRATION RATE: 14 BRPM | BODY MASS INDEX: 31.49 KG/M2 | DIASTOLIC BLOOD PRESSURE: 76 MMHG | TEMPERATURE: 97.8 F | OXYGEN SATURATION: 97 % | HEART RATE: 66 BPM

## 2023-08-09 DIAGNOSIS — M10.9 GOUT, UNSPECIFIED CAUSE, UNSPECIFIED CHRONICITY, UNSPECIFIED SITE: Chronic | ICD-10-CM

## 2023-08-09 DIAGNOSIS — E11.42 DIABETIC PERIPHERAL NEUROPATHY: Chronic | ICD-10-CM

## 2023-08-09 DIAGNOSIS — I10 ESSENTIAL HYPERTENSION: Primary | Chronic | ICD-10-CM

## 2023-08-09 DIAGNOSIS — E78.2 MIXED HYPERLIPIDEMIA: ICD-10-CM

## 2023-08-09 PROCEDURE — 1160F RVW MEDS BY RX/DR IN RCRD: CPT | Performed by: FAMILY MEDICINE

## 2023-08-09 PROCEDURE — 3044F HG A1C LEVEL LT 7.0%: CPT | Performed by: FAMILY MEDICINE

## 2023-08-09 PROCEDURE — 3078F DIAST BP <80 MM HG: CPT | Performed by: FAMILY MEDICINE

## 2023-08-09 PROCEDURE — 99214 OFFICE O/P EST MOD 30 MIN: CPT | Performed by: FAMILY MEDICINE

## 2023-08-09 PROCEDURE — 3075F SYST BP GE 130 - 139MM HG: CPT | Performed by: FAMILY MEDICINE

## 2023-08-09 PROCEDURE — 1159F MED LIST DOCD IN RCRD: CPT | Performed by: FAMILY MEDICINE

## 2023-08-09 RX ORDER — ROSUVASTATIN CALCIUM 5 MG/1
5 TABLET, COATED ORAL DAILY
Qty: 90 TABLET | Refills: 3 | Status: SHIPPED | OUTPATIENT
Start: 2023-08-09

## 2023-08-09 RX ORDER — AMLODIPINE AND VALSARTAN 10; 320 MG/1; MG/1
1 TABLET ORAL DAILY
Qty: 90 TABLET | Refills: 3 | Status: SHIPPED | OUTPATIENT
Start: 2023-08-09

## 2023-08-09 RX ORDER — GABAPENTIN 300 MG/1
300 CAPSULE ORAL 2 TIMES DAILY
Qty: 180 CAPSULE | Refills: 1 | Status: SHIPPED | OUTPATIENT
Start: 2023-08-09

## 2023-08-09 RX ORDER — ALLOPURINOL 100 MG/1
100 TABLET ORAL DAILY
Qty: 90 TABLET | Refills: 3 | Status: SHIPPED | OUTPATIENT
Start: 2023-08-09

## 2023-08-14 DIAGNOSIS — E06.3 HYPOTHYROIDISM DUE TO HASHIMOTO'S THYROIDITIS: ICD-10-CM

## 2023-08-14 DIAGNOSIS — E03.8 HYPOTHYROIDISM DUE TO HASHIMOTO'S THYROIDITIS: ICD-10-CM

## 2023-08-15 RX ORDER — LEVOTHYROXINE SODIUM 125 MCG
125 TABLET ORAL DAILY
Qty: 90 TABLET | Refills: 1 | Status: SHIPPED | OUTPATIENT
Start: 2023-08-15

## 2023-12-07 ENCOUNTER — TELEPHONE (OUTPATIENT)
Dept: FAMILY MEDICINE CLINIC | Facility: CLINIC | Age: 73
End: 2023-12-07
Payer: MEDICARE

## 2023-12-07 DIAGNOSIS — Z12.31 ENCOUNTER FOR SCREENING MAMMOGRAM FOR MALIGNANT NEOPLASM OF BREAST: Primary | ICD-10-CM

## 2023-12-07 NOTE — TELEPHONE ENCOUNTER
Caller: Jose Keys    Relationship: Self    Best call back number: [phone]    What orders are you requesting (i.e. lab or imaging): MAMMOGRAM     In what timeframe would the patient need to come in: ANYTIME AFTER 12/22/2023    Where will you receive your lab/imaging services: SAME PLACE SHE ALWAYS GOES TO - CAN NOT REMEMBER THE FACILITY NAME     Additional notes: PATIENT IS REQUESTING A CALL BACK.

## 2023-12-08 DIAGNOSIS — E11.40 CONTROLLED TYPE 2 DIABETES MELLITUS WITH DIABETIC NEUROPATHY, WITHOUT LONG-TERM CURRENT USE OF INSULIN: ICD-10-CM

## 2023-12-08 RX ORDER — METFORMIN HYDROCHLORIDE 500 MG/1
500 TABLET, EXTENDED RELEASE ORAL DAILY
Qty: 90 TABLET | Refills: 1 | Status: SHIPPED | OUTPATIENT
Start: 2023-12-08

## 2023-12-08 NOTE — TELEPHONE ENCOUNTER
Rx Refill Note  Requested Prescriptions     Pending Prescriptions Disp Refills    metFORMIN ER (GLUCOPHAGE-XR) 500 MG 24 hr tablet [Pharmacy Med Name: METFORMIN ER 500MG 24HR TABS] 90 tablet 1     Sig: TAKE 1 TABLET BY MOUTH DAILY      Last office visit with prescribing clinician: 3/21/2023   Last telemedicine visit with prescribing clinician: Visit date not found   Next office visit with prescribing clinician: Visit date not found                         Would you like a call back once the refill request has been completed: [] Yes [] No    If the office needs to give you a call back, can they leave a voicemail: [] Yes [] No    Swapna Erickson  12/08/23, 08:52 EST

## 2023-12-11 ENCOUNTER — TRANSCRIBE ORDERS (OUTPATIENT)
Dept: ADMINISTRATIVE | Facility: HOSPITAL | Age: 73
End: 2023-12-11
Payer: MEDICARE

## 2023-12-11 DIAGNOSIS — Z12.31 BREAST CANCER SCREENING BY MAMMOGRAM: Primary | ICD-10-CM

## 2024-01-08 NOTE — PROGRESS NOTES
"Subjective   Jose Keys is a 73 y.o. female.     CC: PreOp Clearance    History of Present Illness     Patient comes in today for preop clearance for pelvic floor surgery at the end of February with Dr. Annmarie Kirby.  Patient feeling well today without issues and is ready to proceed with that surgery. No CP/Dyspnea.  She is going for a stress test tomorrow and seen her endocrinologist shortly after.    Pt also doing well with her Neurontin, no SEs, and needs a refill today.    The following portions of the patient's history were reviewed and updated as appropriate: allergies, current medications, past family history, past medical history, past social history, past surgical history, and problem list.    Review of Systems   Constitutional:  Negative for activity change, chills and fever.   Respiratory:  Negative for cough.    Cardiovascular:  Negative for chest pain.   Psychiatric/Behavioral:  Negative for dysphoric mood.        /79   Pulse 70   Temp 97.6 °F (36.4 °C) (Oral)   Resp 16   Ht 165.1 cm (65\")   Wt 86.6 kg (191 lb)   SpO2 97%   BMI 31.78 kg/m²     Objective   Physical Exam  Constitutional:       General: She is not in acute distress.     Appearance: She is well-developed.   Cardiovascular:      Rate and Rhythm: Normal rate and regular rhythm.   Pulmonary:      Effort: Pulmonary effort is normal.      Breath sounds: Normal breath sounds.   Neurological:      Mental Status: She is alert and oriented to person, place, and time.   Psychiatric:         Behavior: Behavior normal.         Thought Content: Thought content normal.         Assessment & Plan   Diagnoses and all orders for this visit:    1. Preoperative clearance (Primary)    2. Diabetic peripheral neuropathy  -     gabapentin (NEURONTIN) 300 MG capsule; Take 1 capsule by mouth 2 (Two) Times a Day.  Dispense: 180 capsule; Refill: 1    Patient will be going for preop labs and stress test and, once reviewed and acceptable, will clear patient " for her procedure.

## 2024-01-09 ENCOUNTER — OFFICE VISIT (OUTPATIENT)
Dept: FAMILY MEDICINE CLINIC | Facility: CLINIC | Age: 74
End: 2024-01-09
Payer: MEDICARE

## 2024-01-09 VITALS
HEIGHT: 65 IN | TEMPERATURE: 97.6 F | OXYGEN SATURATION: 97 % | SYSTOLIC BLOOD PRESSURE: 164 MMHG | DIASTOLIC BLOOD PRESSURE: 79 MMHG | WEIGHT: 191 LBS | BODY MASS INDEX: 31.82 KG/M2 | RESPIRATION RATE: 16 BRPM | HEART RATE: 70 BPM

## 2024-01-09 DIAGNOSIS — E11.42 DIABETIC PERIPHERAL NEUROPATHY: Chronic | ICD-10-CM

## 2024-01-09 DIAGNOSIS — Z01.818 PREOPERATIVE CLEARANCE: Primary | ICD-10-CM

## 2024-01-09 PROCEDURE — 3077F SYST BP >= 140 MM HG: CPT | Performed by: FAMILY MEDICINE

## 2024-01-09 PROCEDURE — 99213 OFFICE O/P EST LOW 20 MIN: CPT | Performed by: FAMILY MEDICINE

## 2024-01-09 PROCEDURE — 3078F DIAST BP <80 MM HG: CPT | Performed by: FAMILY MEDICINE

## 2024-01-09 PROCEDURE — 1159F MED LIST DOCD IN RCRD: CPT | Performed by: FAMILY MEDICINE

## 2024-01-09 PROCEDURE — 1160F RVW MEDS BY RX/DR IN RCRD: CPT | Performed by: FAMILY MEDICINE

## 2024-01-09 RX ORDER — DICLOFENAC SODIUM 75 MG/1
1 TABLET, DELAYED RELEASE ORAL EVERY 12 HOURS SCHEDULED
COMMUNITY
Start: 2023-11-08

## 2024-01-09 RX ORDER — GABAPENTIN 300 MG/1
300 CAPSULE ORAL 2 TIMES DAILY
Qty: 180 CAPSULE | Refills: 1 | Status: SHIPPED | OUTPATIENT
Start: 2024-01-09

## 2024-01-30 ENCOUNTER — OFFICE VISIT (OUTPATIENT)
Dept: ENDOCRINOLOGY | Age: 74
End: 2024-01-30
Payer: MEDICARE

## 2024-01-30 VITALS
HEART RATE: 70 BPM | TEMPERATURE: 98 F | HEIGHT: 65 IN | WEIGHT: 189 LBS | OXYGEN SATURATION: 97 % | SYSTOLIC BLOOD PRESSURE: 118 MMHG | DIASTOLIC BLOOD PRESSURE: 72 MMHG | BODY MASS INDEX: 31.49 KG/M2

## 2024-01-30 DIAGNOSIS — I10 ESSENTIAL HYPERTENSION: ICD-10-CM

## 2024-01-30 DIAGNOSIS — E78.5 HYPERLIPIDEMIA, UNSPECIFIED HYPERLIPIDEMIA TYPE: ICD-10-CM

## 2024-01-30 DIAGNOSIS — I25.10 CORONARY ARTERY DISEASE INVOLVING NATIVE CORONARY ARTERY OF NATIVE HEART WITHOUT ANGINA PECTORIS: ICD-10-CM

## 2024-01-30 DIAGNOSIS — Z78.9 STATIN INTOLERANCE: ICD-10-CM

## 2024-01-30 DIAGNOSIS — Z87.39 HISTORY OF GOUT: ICD-10-CM

## 2024-01-30 DIAGNOSIS — E03.9 PRIMARY HYPOTHYROIDISM: ICD-10-CM

## 2024-01-30 DIAGNOSIS — E11.42 TYPE 2 DIABETES MELLITUS WITH PERIPHERAL NEUROPATHY: Primary | ICD-10-CM

## 2024-01-30 PROCEDURE — 99214 OFFICE O/P EST MOD 30 MIN: CPT | Performed by: INTERNAL MEDICINE

## 2024-01-30 PROCEDURE — 3074F SYST BP LT 130 MM HG: CPT | Performed by: INTERNAL MEDICINE

## 2024-01-30 PROCEDURE — 3078F DIAST BP <80 MM HG: CPT | Performed by: INTERNAL MEDICINE

## 2024-01-30 RX ORDER — CHLORAL HYDRATE 500 MG
2 CAPSULE ORAL
COMMUNITY

## 2024-01-30 RX ORDER — ESTRADIOL 0.1 MG/G
CREAM VAGINAL
COMMUNITY
Start: 2023-11-27

## 2024-01-30 NOTE — LETTER
January 30, 2024     James Lau MD  [address] [facility]  [address]  [facility] [address]    Patient: Jose Keys   YOB: 1950   Date of Visit: 1/30/2024     Dear James Lau MD:       Thank you for referring Jose Keys to me for evaluation. Below are the relevant portions of my assessment and plan of care.    If you have questions, please do not hesitate to call me. I look forward to following Jose along with you.         Sincerely,        Fredy Loera MD        CC: James M Kammerling, MD Yson, Fredy MONACO MD  01/30/24 1256  Sign when Signing Visit  Subjective  Jose Keys is a 73 y.o. female.     History of Present Illness     She has known diabetes mellitus since 2002.  She is on metformin  mg 1 tablet daily.  She does not check her blood sugars at home.  She has lost 3 pounds since July 2023.  She has started chair yoga and dancing for exercise.      Her last eye examination was in August 2023.  She has no retinopathy.  Urine microalbumin was normal in 3/23.  She has numbness and tingling in her feet and is on gabapentin 300 mg twice a day prescriber Dr. Lau.  She was seen by a neurologist in the past.     She has hypothyroidism and is on Synthroid 125 mcg a day.  She has no previous history of head or neck radiation since treatment or thyroid surgery.       She has on and off diarrhea over the past year.  Her last colonoscopy and EGD were done by Dr. Kaiser  in 2018.  She had a rectal polyp removed.  She has history of wheat allergy which causes itching.  She has burning sensation when she swallows.  She is on Prilosec and Tums as needed. She had normal EGD and colonoscopy done by Dr. Kaiser in 2023     She has hyperlipidemia and is on Crestor 5 mg every evening and fish oil 1 cap/day.  Her cardiologist has advised her to increase the dose to 20 mg/day.  She had muscle pain with atorvastatin, simvastatin and Zetia before.  She had leg edema with Praluent. She has not used  "Repatha before.  Her insurance will not cover Nexletol.     She has hypertension and is on Coreg 3.125 twice daily and amlodipine valsartan 10/320 mg daily.  She has no history of heart attack or stroke.  She denies chest pain.  She follows with Dr. Kammerling.    She had cardiac catheterization done by Dr. Duran on January 17, 2024 in preparation for bladder suspension surgery.  She has 30% stenosis of the right coronary artery.     She has history of vitamin D deficiency and in on multivitamins 1 tab daily.  She had a normal bone density in August 2021.     She has history of gout and is on allopurinol 100 mg 1 tablet every other day.  She denies history of nephrolithiasis.       The following portions of the patient's history were reviewed and updated as appropriate: allergies, current medications, past family history, past medical history, past social history, past surgical history, and problem list.    Review of Systems   Eyes:  Negative for visual disturbance.   Respiratory:  Negative for shortness of breath and wheezing.    Gastrointestinal: Negative.    Genitourinary:         Incontinence   Musculoskeletal:  Negative for myalgias.   Neurological:  Positive for numbness.     Vitals:    01/30/24 1140   BP: 118/72   Pulse: 70   Temp: 98 °F (36.7 °C)   TempSrc: Temporal   SpO2: 97%   Weight: 85.7 kg (189 lb)   Height: 165.1 cm (65\")      Objective  Physical Exam  Constitutional:       General: She is not in acute distress.     Appearance: Normal appearance. She is not ill-appearing or toxic-appearing.   Eyes:      General: No scleral icterus.        Right eye: No discharge.         Left eye: No discharge.      Extraocular Movements: Extraocular movements intact.      Conjunctiva/sclera: Conjunctivae normal.   Neck:      Vascular: No carotid bruit.   Cardiovascular:      Rate and Rhythm: Normal rate and regular rhythm.      Heart sounds: Normal heart sounds. No murmur heard.     No friction rub.   Pulmonary:      " Effort: Pulmonary effort is normal.      Breath sounds: Normal breath sounds.   Abdominal:      General: Bowel sounds are normal.      Palpations: Abdomen is soft.      Tenderness: There is no right CVA tenderness or left CVA tenderness.   Musculoskeletal:      Right lower leg: No edema.      Left lower leg: No edema.      Comments: Feet warm.  No cyanosis.  Faint dorsalis pedis pulses bilaterally.  Mild hammertoe deformity on right second toe.  No plantar ulcers.   Lymphadenopathy:      Cervical: No cervical adenopathy.   Skin:     General: Skin is warm and dry.   Neurological:      General: No focal deficit present.      Mental Status: She is alert and oriented to person, place, and time.      Comments: Decreased light touch in distal lower extremities.  Intact proprioception on both big toes.   Psychiatric:         Mood and Affect: Mood normal.         Behavior: Behavior normal.       Results Encounter on 07/21/2023   Component Date Value Ref Range Status   • Hemoglobin A1C 07/18/2023 6.10 (H)  4.80 - 5.60 % Final    Comment: Hemoglobin A1C Ranges:  Increased Risk for Diabetes  5.7% to 6.4%  Diabetes                     >= 6.5%  Diabetic Goal                < 7.0%     • Glucose 07/18/2023 88  65 - 99 mg/dL Final   • BUN 07/18/2023 12  8 - 23 mg/dL Final   • Creatinine 07/18/2023 0.81  0.57 - 1.00 mg/dL Final   • EGFR Result 07/18/2023 77.2  >60.0 mL/min/1.73 Final    Comment: GFR Normal >60  Chronic Kidney Disease <60  Kidney Failure <15  The GFR formula is only valid for adults with stable renal  function between ages 18 and 70.     • BUN/Creatinine Ratio 07/18/2023 14.8  7.0 - 25.0 Final   • Sodium 07/18/2023 144  136 - 145 mmol/L Final   • Potassium 07/18/2023 4.9  3.5 - 5.2 mmol/L Final   • Chloride 07/18/2023 104  98 - 107 mmol/L Final   • Total CO2 07/18/2023 26.9  22.0 - 29.0 mmol/L Final   • Calcium 07/18/2023 9.6  8.6 - 10.5 mg/dL Final   • Total Protein 07/18/2023 6.8  6.0 - 8.5 g/dL Final   • Albumin  07/18/2023 4.6  3.5 - 5.2 g/dL Final   • Globulin 07/18/2023 2.2  gm/dL Final   • A/G Ratio 07/18/2023 2.1  g/dL Final   • Total Bilirubin 07/18/2023 0.2  0.0 - 1.2 mg/dL Final   • Alkaline Phosphatase 07/18/2023 71  39 - 117 U/L Final   • AST (SGOT) 07/18/2023 18  1 - 32 U/L Final   • ALT (SGPT) 07/18/2023 29  1 - 33 U/L Final   • Total Cholesterol 07/18/2023 238 (H)  0 - 200 mg/dL Final    Comment: Cholesterol Reference Ranges  (U.S. Department of Health and Human Services ATP III  Classifications)  Desirable          <200 mg/dL  Borderline High    200-239 mg/dL  High Risk          >240 mg/dL  Triglyceride Reference Ranges  (U.S. Department of Health and Human Services ATP III  Classifications)  Normal           <150 mg/dL  Borderline High  150-199 mg/dL  High             200-499 mg/dL  Very High        >500 mg/dL  HDL Reference Ranges  (U.S. Department of Health and Human Services ATP III  Classifications)  Low     <40 mg/dl (major risk factor for CHD)  High    >60 mg/dl ('negative' risk factor for CHD)  LDL Reference Ranges  (U.S. Department of Health and Human Services ATP III  Classifications)  Optimal          <100 mg/dL  Near Optimal     100-129 mg/dL  Borderline High  130-159 mg/dL  High             160-189 mg/dL  Very High        >189 mg/dL     • Triglycerides 07/18/2023 226 (H)  0 - 150 mg/dL Final   • HDL Cholesterol 07/18/2023 49  40 - 60 mg/dL Final   • VLDL Cholesterol Kyler 07/18/2023 41 (H)  5 - 40 mg/dL Final   • LDL Chol Calc (NIH) 07/18/2023 148 (H)  0 - 100 mg/dL Final   • TSH 07/18/2023 2.820  0.270 - 4.200 uIU/mL Final   • Interpretation 07/18/2023 Note   Final    Supplemental report is available.     Assessment & Plan  Diagnoses and all orders for this visit:    1. Type 2 diabetes mellitus with peripheral neuropathy (Primary)  -     Comprehensive Metabolic Panel  -     Hemoglobin A1c  -     TSH  -     Microalbumin / Creatinine Urine Ratio - Urine, Clean Catch    2. Primary hypothyroidism  -      TSH    3. Statin intolerance    4. Hyperlipidemia, unspecified hyperlipidemia type  -     TSH    5. Essential hypertension    6. Coronary artery disease involving native coronary artery of native heart without angina pectoris    7. History of gout      Continue metformin  mg 1 tablet daily.  Continue gabapentin 300 mg BID.    Continue Synthroid 125 mcg/day.    Continue Crestor per cardiologist.  Continue Coreg and amlodipine valsartan.  Follow-up with Dr. Kammerling.    Continue multivitamins 1 tablet/day.    Continue allopurinol per Dr. Lau.    Copy of my note sent to Dr. Lau and Dr. Kammerling.    RTC 4 mos.

## 2024-01-30 NOTE — PROGRESS NOTES
Subjective   Jose Keys is a 73 y.o. female.     History of Present Illness     She has known diabetes mellitus since 2002.  She is on metformin  mg 1 tablet daily.  She does not check her blood sugars at home.  She has lost 3 pounds since July 2023.  She has started chair yoga and dancing for exercise.      Her last eye examination was in August 2023.  She has no retinopathy.  Urine microalbumin was normal in 3/23.  She has numbness and tingling in her feet and is on gabapentin 300 mg twice a day prescriber Dr. Lau.  She was seen by a neurologist in the past.     She has hypothyroidism and is on Synthroid 125 mcg a day.  She has no previous history of head or neck radiation since treatment or thyroid surgery.       She has on and off diarrhea over the past year.  Her last colonoscopy and EGD were done by Dr. Kaiser  in 2018.  She had a rectal polyp removed.  She has history of wheat allergy which causes itching.  She has burning sensation when she swallows.  She is on Prilosec and Tums as needed. She had normal EGD and colonoscopy done by Dr. Kaiser in 2023     She has hyperlipidemia and is on Crestor 5 mg every evening and fish oil 1 cap/day.  Her cardiologist has advised her to increase the dose to 20 mg/day.  She had muscle pain with atorvastatin, simvastatin and Zetia before.  She had leg edema with Praluent. She has not used Repatha before.  Her insurance will not cover Nexletol.     She has hypertension and is on Coreg 3.125 twice daily and amlodipine valsartan 10/320 mg daily.  She has no history of heart attack or stroke.  She denies chest pain.  She follows with Dr. Kammerling.    She had cardiac catheterization done by Dr. Duran on January 17, 2024 in preparation for bladder suspension surgery.  She has 30% stenosis of the right coronary artery.     She has history of vitamin D deficiency and in on multivitamins 1 tab daily.  She had a normal bone density in August 2021.     She has history of  "gout and is on allopurinol 100 mg 1 tablet every other day.  She denies history of nephrolithiasis.       The following portions of the patient's history were reviewed and updated as appropriate: allergies, current medications, past family history, past medical history, past social history, past surgical history, and problem list.    Review of Systems   Eyes:  Negative for visual disturbance.   Respiratory:  Negative for shortness of breath and wheezing.    Gastrointestinal: Negative.    Genitourinary:         Incontinence   Musculoskeletal:  Negative for myalgias.   Neurological:  Positive for numbness.     Vitals:    01/30/24 1140   BP: 118/72   Pulse: 70   Temp: 98 °F (36.7 °C)   TempSrc: Temporal   SpO2: 97%   Weight: 85.7 kg (189 lb)   Height: 165.1 cm (65\")      Objective   Physical Exam  Constitutional:       General: She is not in acute distress.     Appearance: Normal appearance. She is not ill-appearing or toxic-appearing.   Eyes:      General: No scleral icterus.        Right eye: No discharge.         Left eye: No discharge.      Extraocular Movements: Extraocular movements intact.      Conjunctiva/sclera: Conjunctivae normal.   Neck:      Vascular: No carotid bruit.   Cardiovascular:      Rate and Rhythm: Normal rate and regular rhythm.      Heart sounds: Normal heart sounds. No murmur heard.     No friction rub.   Pulmonary:      Effort: Pulmonary effort is normal.      Breath sounds: Normal breath sounds.   Abdominal:      General: Bowel sounds are normal.      Palpations: Abdomen is soft.      Tenderness: There is no right CVA tenderness or left CVA tenderness.   Musculoskeletal:      Right lower leg: No edema.      Left lower leg: No edema.      Comments: Feet warm.  No cyanosis.  Faint dorsalis pedis pulses bilaterally.  Mild hammertoe deformity on right second toe.  No plantar ulcers.   Lymphadenopathy:      Cervical: No cervical adenopathy.   Skin:     General: Skin is warm and dry. "   Neurological:      General: No focal deficit present.      Mental Status: She is alert and oriented to person, place, and time.      Comments: Decreased light touch in distal lower extremities.  Intact proprioception on both big toes.   Psychiatric:         Mood and Affect: Mood normal.         Behavior: Behavior normal.       Results Encounter on 07/21/2023   Component Date Value Ref Range Status    Hemoglobin A1C 07/18/2023 6.10 (H)  4.80 - 5.60 % Final    Comment: Hemoglobin A1C Ranges:  Increased Risk for Diabetes  5.7% to 6.4%  Diabetes                     >= 6.5%  Diabetic Goal                < 7.0%      Glucose 07/18/2023 88  65 - 99 mg/dL Final    BUN 07/18/2023 12  8 - 23 mg/dL Final    Creatinine 07/18/2023 0.81  0.57 - 1.00 mg/dL Final    EGFR Result 07/18/2023 77.2  >60.0 mL/min/1.73 Final    Comment: GFR Normal >60  Chronic Kidney Disease <60  Kidney Failure <15  The GFR formula is only valid for adults with stable renal  function between ages 18 and 70.      BUN/Creatinine Ratio 07/18/2023 14.8  7.0 - 25.0 Final    Sodium 07/18/2023 144  136 - 145 mmol/L Final    Potassium 07/18/2023 4.9  3.5 - 5.2 mmol/L Final    Chloride 07/18/2023 104  98 - 107 mmol/L Final    Total CO2 07/18/2023 26.9  22.0 - 29.0 mmol/L Final    Calcium 07/18/2023 9.6  8.6 - 10.5 mg/dL Final    Total Protein 07/18/2023 6.8  6.0 - 8.5 g/dL Final    Albumin 07/18/2023 4.6  3.5 - 5.2 g/dL Final    Globulin 07/18/2023 2.2  gm/dL Final    A/G Ratio 07/18/2023 2.1  g/dL Final    Total Bilirubin 07/18/2023 0.2  0.0 - 1.2 mg/dL Final    Alkaline Phosphatase 07/18/2023 71  39 - 117 U/L Final    AST (SGOT) 07/18/2023 18  1 - 32 U/L Final    ALT (SGPT) 07/18/2023 29  1 - 33 U/L Final    Total Cholesterol 07/18/2023 238 (H)  0 - 200 mg/dL Final    Comment: Cholesterol Reference Ranges  (U.S. Department of Health and Human Services ATP III  Classifications)  Desirable          <200 mg/dL  Borderline High    200-239 mg/dL  High Risk           >240 mg/dL  Triglyceride Reference Ranges  (U.S. Department of Health and Human Services ATP III  Classifications)  Normal           <150 mg/dL  Borderline High  150-199 mg/dL  High             200-499 mg/dL  Very High        >500 mg/dL  HDL Reference Ranges  (U.S. Department of Health and Human Services ATP III  Classifications)  Low     <40 mg/dl (major risk factor for CHD)  High    >60 mg/dl ('negative' risk factor for CHD)  LDL Reference Ranges  (U.S. Department of Health and Human Services ATP III  Classifications)  Optimal          <100 mg/dL  Near Optimal     100-129 mg/dL  Borderline High  130-159 mg/dL  High             160-189 mg/dL  Very High        >189 mg/dL      Triglycerides 07/18/2023 226 (H)  0 - 150 mg/dL Final    HDL Cholesterol 07/18/2023 49  40 - 60 mg/dL Final    VLDL Cholesterol Kyler 07/18/2023 41 (H)  5 - 40 mg/dL Final    LDL Chol Calc (NIH) 07/18/2023 148 (H)  0 - 100 mg/dL Final    TSH 07/18/2023 2.820  0.270 - 4.200 uIU/mL Final    Interpretation 07/18/2023 Note   Final    Supplemental report is available.     Assessment & Plan   Diagnoses and all orders for this visit:    1. Type 2 diabetes mellitus with peripheral neuropathy (Primary)  -     Comprehensive Metabolic Panel  -     Hemoglobin A1c  -     TSH  -     Microalbumin / Creatinine Urine Ratio - Urine, Clean Catch    2. Primary hypothyroidism  -     TSH    3. Statin intolerance    4. Hyperlipidemia, unspecified hyperlipidemia type  -     TSH    5. Essential hypertension    6. Coronary artery disease involving native coronary artery of native heart without angina pectoris    7. History of gout      Continue metformin  mg 1 tablet daily.  Continue gabapentin 300 mg BID.    Continue Synthroid 125 mcg/day.    Continue Crestor per cardiologist.  Continue Coreg and amlodipine valsartan.  Follow-up with Dr. Kammerling.    Continue multivitamins 1 tablet/day.    Continue allopurinol per Dr. Lau.    Copy of my note sent to   Judi and Dr. Kammerling.    RTC 4 mos.

## 2024-01-31 LAB
ALBUMIN SERPL-MCNC: 4.7 G/DL (ref 3.5–5.2)
ALBUMIN/CREAT UR: 10 MG/G CREAT (ref 0–29)
ALBUMIN/GLOB SERPL: 1.9 G/DL
ALP SERPL-CCNC: 73 U/L (ref 39–117)
ALT SERPL-CCNC: 26 U/L (ref 1–33)
AST SERPL-CCNC: 19 U/L (ref 1–32)
BILIRUB SERPL-MCNC: 0.3 MG/DL (ref 0–1.2)
BUN SERPL-MCNC: 14 MG/DL (ref 8–23)
BUN/CREAT SERPL: 14.3 (ref 7–25)
CALCIUM SERPL-MCNC: 9.4 MG/DL (ref 8.6–10.5)
CHLORIDE SERPL-SCNC: 104 MMOL/L (ref 98–107)
CO2 SERPL-SCNC: 27.3 MMOL/L (ref 22–29)
CREAT SERPL-MCNC: 0.98 MG/DL (ref 0.57–1)
CREAT UR-MCNC: 208.4 MG/DL
EGFRCR SERPLBLD CKD-EPI 2021: 61.5 ML/MIN/1.73
GLOBULIN SER CALC-MCNC: 2.5 GM/DL
GLUCOSE SERPL-MCNC: 82 MG/DL (ref 65–99)
HBA1C MFR BLD: 6.2 % (ref 4.8–5.6)
MICROALBUMIN UR-MCNC: 20.8 UG/ML
POTASSIUM SERPL-SCNC: 4.6 MMOL/L (ref 3.5–5.2)
PROT SERPL-MCNC: 7.2 G/DL (ref 6–8.5)
SODIUM SERPL-SCNC: 142 MMOL/L (ref 136–145)
TSH SERPL DL<=0.005 MIU/L-ACNC: 0.56 UIU/ML (ref 0.27–4.2)

## 2024-02-01 NOTE — PROGRESS NOTES
Hemoglobin A1c 6.2%.  Diabetes is well-controlled.  Normal TSH.  Continue Synthroid 125 mcg/day.  Normal urine microalbumin.  Copy of labs sent to Dr. Lau and to patient through [employer].  Send copy of labs to Dr. Kammerling.

## 2024-02-11 DIAGNOSIS — E03.8 HYPOTHYROIDISM DUE TO HASHIMOTO'S THYROIDITIS: ICD-10-CM

## 2024-02-11 DIAGNOSIS — E06.3 HYPOTHYROIDISM DUE TO HASHIMOTO'S THYROIDITIS: ICD-10-CM

## 2024-02-12 ENCOUNTER — OFFICE VISIT (OUTPATIENT)
Dept: FAMILY MEDICINE CLINIC | Facility: CLINIC | Age: 74
End: 2024-02-12
Payer: MEDICARE

## 2024-02-12 VITALS
OXYGEN SATURATION: 98 % | BODY MASS INDEX: 31.82 KG/M2 | SYSTOLIC BLOOD PRESSURE: 116 MMHG | DIASTOLIC BLOOD PRESSURE: 58 MMHG | TEMPERATURE: 97.8 F | HEIGHT: 65 IN | HEART RATE: 64 BPM | WEIGHT: 191 LBS | RESPIRATION RATE: 14 BRPM

## 2024-02-12 DIAGNOSIS — Z00.00 ENCOUNTER FOR SUBSEQUENT ANNUAL WELLNESS VISIT (AWV) IN MEDICARE PATIENT: Primary | ICD-10-CM

## 2024-02-12 DIAGNOSIS — Z01.818 PREOPERATIVE CLEARANCE: ICD-10-CM

## 2024-02-12 DIAGNOSIS — Z78.0 POSTMENOPAUSAL: ICD-10-CM

## 2024-02-12 PROCEDURE — 1160F RVW MEDS BY RX/DR IN RCRD: CPT | Performed by: FAMILY MEDICINE

## 2024-02-12 PROCEDURE — 1170F FXNL STATUS ASSESSED: CPT | Performed by: FAMILY MEDICINE

## 2024-02-12 PROCEDURE — 3078F DIAST BP <80 MM HG: CPT | Performed by: FAMILY MEDICINE

## 2024-02-12 PROCEDURE — 3074F SYST BP LT 130 MM HG: CPT | Performed by: FAMILY MEDICINE

## 2024-02-12 PROCEDURE — 1126F AMNT PAIN NOTED NONE PRSNT: CPT | Performed by: FAMILY MEDICINE

## 2024-02-12 PROCEDURE — 3044F HG A1C LEVEL LT 7.0%: CPT | Performed by: FAMILY MEDICINE

## 2024-02-12 PROCEDURE — G0439 PPPS, SUBSEQ VISIT: HCPCS | Performed by: FAMILY MEDICINE

## 2024-02-12 PROCEDURE — 99212 OFFICE O/P EST SF 10 MIN: CPT | Performed by: FAMILY MEDICINE

## 2024-02-12 RX ORDER — LEVOTHYROXINE SODIUM 125 MCG
125 TABLET ORAL DAILY
Qty: 90 TABLET | Refills: 1 | Status: SHIPPED | OUTPATIENT
Start: 2024-02-12

## 2024-02-27 PROBLEM — N81.9 VAGINAL VAULT PROLAPSE: Status: ACTIVE | Noted: 2024-02-27

## 2024-02-28 ENCOUNTER — ANESTHESIA (OUTPATIENT)
Dept: PERIOP | Facility: HOSPITAL | Age: 74
End: 2024-02-28
Payer: MEDICARE

## 2024-02-28 ENCOUNTER — ANESTHESIA EVENT (OUTPATIENT)
Dept: PERIOP | Facility: HOSPITAL | Age: 74
End: 2024-02-28
Payer: MEDICARE

## 2024-02-28 ENCOUNTER — HOSPITAL ENCOUNTER (OUTPATIENT)
Facility: HOSPITAL | Age: 74
Discharge: HOME | End: 2024-02-29
Attending: OBSTETRICS & GYNECOLOGY | Admitting: OBSTETRICS & GYNECOLOGY
Payer: MEDICARE

## 2024-02-28 PROBLEM — N81.5 VAGINAL ENTEROCELE: Status: ACTIVE | Noted: 2024-02-28

## 2024-02-28 PROBLEM — N81.6 RECTOCELE: Status: ACTIVE | Noted: 2024-02-28

## 2024-02-28 PROBLEM — N39.3 FEMALE STRESS INCONTINENCE: Status: ACTIVE | Noted: 2024-02-28

## 2024-02-28 PROBLEM — N81.89 LOSS OF PERINEAL BODY, FEMALE: Status: ACTIVE | Noted: 2024-02-28

## 2024-02-28 PROBLEM — K59.02 OUTLET DYSFUNCTION CONSTIPATION: Status: ACTIVE | Noted: 2024-02-28

## 2024-02-28 LAB
ABO GROUP BLD: NORMAL
BLD GP AB SCN SERPL QL: NEGATIVE
GLUCOSE BLDC GLUCOMTR-MCNC: 95 MG/DL (ref 70–130)
RH BLD: POSITIVE
T&S EXPIRATION DATE: NORMAL

## 2024-02-28 PROCEDURE — 86901 BLOOD TYPING SEROLOGIC RH(D): CPT | Performed by: OBSTETRICS & GYNECOLOGY

## 2024-02-28 PROCEDURE — 25010000002 KETOROLAC TROMETHAMINE PER 15 MG: Performed by: NURSE ANESTHETIST, CERTIFIED REGISTERED

## 2024-02-28 PROCEDURE — G0378 HOSPITAL OBSERVATION PER HR: HCPCS

## 2024-02-28 PROCEDURE — 25010000002 HYDROMORPHONE 1 MG/ML SOLUTION: Performed by: ANESTHESIOLOGY

## 2024-02-28 PROCEDURE — C1889 IMPLANT/INSERT DEVICE, NOC: HCPCS

## 2024-02-28 PROCEDURE — 25010000002 SUGAMMADEX 200 MG/2ML SOLUTION: Performed by: NURSE ANESTHETIST, CERTIFIED REGISTERED

## 2024-02-28 PROCEDURE — 82948 REAGENT STRIP/BLOOD GLUCOSE: CPT | Performed by: FAMILY MEDICINE

## 2024-02-28 PROCEDURE — 86850 RBC ANTIBODY SCREEN: CPT | Performed by: OBSTETRICS & GYNECOLOGY

## 2024-02-28 PROCEDURE — 25810000003 SODIUM CHLORIDE PER 500 ML: Performed by: OBSTETRICS & GYNECOLOGY

## 2024-02-28 PROCEDURE — 25810000003 SODIUM CHLORIDE 0.9 % SOLUTION: Performed by: OBSTETRICS & GYNECOLOGY

## 2024-02-28 PROCEDURE — 25010000002 ONDANSETRON PER 1 MG: Performed by: NURSE ANESTHETIST, CERTIFIED REGISTERED

## 2024-02-28 PROCEDURE — 25010000002 HYDROMORPHONE PER 4 MG: Performed by: ANESTHESIOLOGY

## 2024-02-28 PROCEDURE — 86900 BLOOD TYPING SEROLOGIC ABO: CPT | Performed by: OBSTETRICS & GYNECOLOGY

## 2024-02-28 PROCEDURE — 25810000003 LACTATED RINGERS PER 1000 ML: Performed by: ANESTHESIOLOGY

## 2024-02-28 PROCEDURE — 25010000002 DEXAMETHASONE SODIUM PHOSPHATE 20 MG/5ML SOLUTION: Performed by: ANESTHESIOLOGY

## 2024-02-28 PROCEDURE — 25010000002 ONDANSETRON PER 1 MG: Performed by: ANESTHESIOLOGY

## 2024-02-28 PROCEDURE — 25010000002 MIDAZOLAM PER 1 MG: Performed by: ANESTHESIOLOGY

## 2024-02-28 PROCEDURE — 25010000002 CEFAZOLIN IN DEXTROSE 2-4 GM/100ML-% SOLUTION: Performed by: OBSTETRICS & GYNECOLOGY

## 2024-02-28 PROCEDURE — 82948 REAGENT STRIP/BLOOD GLUCOSE: CPT

## 2024-02-28 PROCEDURE — 25010000002 PROPOFOL 200 MG/20ML EMULSION: Performed by: ANESTHESIOLOGY

## 2024-02-28 DEVICE — FLOSEAL WITH RECOTHROM - 10ML.
Type: IMPLANTABLE DEVICE | Site: VAGINA | Status: FUNCTIONAL
Brand: FLOSEAL HEMOSTATIC MATRIX

## 2024-02-28 DEVICE — GRFT TISS STRATTICE FIRM 6X10CM: Type: IMPLANTABLE DEVICE | Site: VAGINA | Status: FUNCTIONAL

## 2024-02-28 RX ORDER — IBUPROFEN 400 MG/1
400 TABLET ORAL EVERY 6 HOURS SCHEDULED
Status: DISCONTINUED | OUTPATIENT
Start: 2024-02-28 | End: 2024-02-29 | Stop reason: HOSPADM

## 2024-02-28 RX ORDER — SODIUM CHLORIDE 0.9 % (FLUSH) 0.9 %
3 SYRINGE (ML) INJECTION EVERY 12 HOURS SCHEDULED
Status: DISCONTINUED | OUTPATIENT
Start: 2024-02-28 | End: 2024-02-28 | Stop reason: HOSPADM

## 2024-02-28 RX ORDER — ONDANSETRON 2 MG/ML
4 INJECTION INTRAMUSCULAR; INTRAVENOUS EVERY 6 HOURS PRN
Status: DISCONTINUED | OUTPATIENT
Start: 2024-02-28 | End: 2024-02-29 | Stop reason: HOSPADM

## 2024-02-28 RX ORDER — ENOXAPARIN SODIUM 100 MG/ML
40 INJECTION SUBCUTANEOUS DAILY
Status: DISCONTINUED | OUTPATIENT
Start: 2024-02-29 | End: 2024-02-29 | Stop reason: HOSPADM

## 2024-02-28 RX ORDER — FAMOTIDINE 10 MG/ML
20 INJECTION, SOLUTION INTRAVENOUS ONCE
Status: COMPLETED | OUTPATIENT
Start: 2024-02-28 | End: 2024-02-28

## 2024-02-28 RX ORDER — MIDAZOLAM HYDROCHLORIDE 1 MG/ML
0.5 INJECTION INTRAMUSCULAR; INTRAVENOUS
Status: COMPLETED | OUTPATIENT
Start: 2024-02-28 | End: 2024-02-28

## 2024-02-28 RX ORDER — SODIUM CHLORIDE 9 MG/ML
100 INJECTION, SOLUTION INTRAVENOUS CONTINUOUS
Status: DISCONTINUED | OUTPATIENT
Start: 2024-02-28 | End: 2024-02-29 | Stop reason: HOSPADM

## 2024-02-28 RX ORDER — TRAMADOL HYDROCHLORIDE 50 MG/1
50 TABLET ORAL EVERY 6 HOURS PRN
Status: DISCONTINUED | OUTPATIENT
Start: 2024-02-28 | End: 2024-02-29 | Stop reason: HOSPADM

## 2024-02-28 RX ORDER — GABAPENTIN 300 MG/1
300 CAPSULE ORAL 2 TIMES DAILY
Status: DISCONTINUED | OUTPATIENT
Start: 2024-02-28 | End: 2024-02-29 | Stop reason: HOSPADM

## 2024-02-28 RX ORDER — EPHEDRINE SULFATE 50 MG/ML
10 INJECTION, SOLUTION INTRAVENOUS ONCE AS NEEDED
Status: DISCONTINUED | OUTPATIENT
Start: 2024-02-28 | End: 2024-02-28 | Stop reason: HOSPADM

## 2024-02-28 RX ORDER — SODIUM CHLORIDE 0.9 % (FLUSH) 0.9 %
10 SYRINGE (ML) INJECTION EVERY 12 HOURS SCHEDULED
Status: DISCONTINUED | OUTPATIENT
Start: 2024-02-28 | End: 2024-02-28 | Stop reason: HOSPADM

## 2024-02-28 RX ORDER — ONDANSETRON 4 MG/1
4 TABLET, ORALLY DISINTEGRATING ORAL EVERY 6 HOURS PRN
Status: DISCONTINUED | OUTPATIENT
Start: 2024-02-28 | End: 2024-02-29 | Stop reason: HOSPADM

## 2024-02-28 RX ORDER — CEFAZOLIN SODIUM 2 G/100ML
2000 INJECTION, SOLUTION INTRAVENOUS EVERY 8 HOURS
Qty: 200 ML | Refills: 0 | Status: COMPLETED | OUTPATIENT
Start: 2024-02-28 | End: 2024-02-29

## 2024-02-28 RX ORDER — CARVEDILOL 3.12 MG/1
3.12 TABLET ORAL 2 TIMES DAILY WITH MEALS
Status: DISCONTINUED | OUTPATIENT
Start: 2024-02-28 | End: 2024-02-29 | Stop reason: HOSPADM

## 2024-02-28 RX ORDER — FENTANYL CITRATE 50 UG/ML
25 INJECTION, SOLUTION INTRAMUSCULAR; INTRAVENOUS
Status: DISCONTINUED | OUTPATIENT
Start: 2024-02-28 | End: 2024-02-28 | Stop reason: HOSPADM

## 2024-02-28 RX ORDER — ONDANSETRON 2 MG/ML
4 INJECTION INTRAMUSCULAR; INTRAVENOUS ONCE AS NEEDED
Status: COMPLETED | OUTPATIENT
Start: 2024-02-28 | End: 2024-02-28

## 2024-02-28 RX ORDER — LIDOCAINE HYDROCHLORIDE 20 MG/ML
INJECTION, SOLUTION INFILTRATION; PERINEURAL AS NEEDED
Status: DISCONTINUED | OUTPATIENT
Start: 2024-02-28 | End: 2024-02-28 | Stop reason: SURG

## 2024-02-28 RX ORDER — ALLOPURINOL 100 MG/1
100 TABLET ORAL DAILY
Status: DISCONTINUED | OUTPATIENT
Start: 2024-02-28 | End: 2024-02-29 | Stop reason: HOSPADM

## 2024-02-28 RX ORDER — ONDANSETRON 2 MG/ML
INJECTION INTRAMUSCULAR; INTRAVENOUS AS NEEDED
Status: DISCONTINUED | OUTPATIENT
Start: 2024-02-28 | End: 2024-02-28 | Stop reason: SURG

## 2024-02-28 RX ORDER — PROPOFOL 10 MG/ML
INJECTION, EMULSION INTRAVENOUS AS NEEDED
Status: DISCONTINUED | OUTPATIENT
Start: 2024-02-28 | End: 2024-02-28 | Stop reason: SURG

## 2024-02-28 RX ORDER — KETAMINE HCL IN NACL, ISO-OSM 100MG/10ML
SYRINGE (ML) INJECTION AS NEEDED
Status: DISCONTINUED | OUTPATIENT
Start: 2024-02-28 | End: 2024-02-28 | Stop reason: SURG

## 2024-02-28 RX ORDER — MAGNESIUM HYDROXIDE 1200 MG/15ML
LIQUID ORAL AS NEEDED
Status: DISCONTINUED | OUTPATIENT
Start: 2024-02-28 | End: 2024-02-28 | Stop reason: HOSPADM

## 2024-02-28 RX ORDER — BUPIVACAINE HYDROCHLORIDE AND EPINEPHRINE 5; 5 MG/ML; UG/ML
INJECTION, SOLUTION EPIDURAL; INTRACAUDAL; PERINEURAL AS NEEDED
Status: DISCONTINUED | OUTPATIENT
Start: 2024-02-28 | End: 2024-02-28 | Stop reason: HOSPADM

## 2024-02-28 RX ORDER — DEXAMETHASONE SODIUM PHOSPHATE 4 MG/ML
INJECTION, SOLUTION INTRA-ARTICULAR; INTRALESIONAL; INTRAMUSCULAR; INTRAVENOUS; SOFT TISSUE AS NEEDED
Status: DISCONTINUED | OUTPATIENT
Start: 2024-02-28 | End: 2024-02-28 | Stop reason: SURG

## 2024-02-28 RX ORDER — DROPERIDOL 2.5 MG/ML
0.62 INJECTION, SOLUTION INTRAMUSCULAR; INTRAVENOUS ONCE AS NEEDED
Status: DISCONTINUED | OUTPATIENT
Start: 2024-02-28 | End: 2024-02-28 | Stop reason: HOSPADM

## 2024-02-28 RX ORDER — SODIUM CHLORIDE 9 MG/ML
INJECTION, SOLUTION INTRAVENOUS AS NEEDED
Status: DISCONTINUED | OUTPATIENT
Start: 2024-02-28 | End: 2024-02-28 | Stop reason: HOSPADM

## 2024-02-28 RX ORDER — SODIUM CHLORIDE 9 MG/ML
40 INJECTION, SOLUTION INTRAVENOUS AS NEEDED
Status: DISCONTINUED | OUTPATIENT
Start: 2024-02-28 | End: 2024-02-28 | Stop reason: HOSPADM

## 2024-02-28 RX ORDER — PROMETHAZINE HYDROCHLORIDE 12.5 MG/1
12.5 SUPPOSITORY RECTAL EVERY 6 HOURS PRN
Status: DISCONTINUED | OUTPATIENT
Start: 2024-02-28 | End: 2024-02-29 | Stop reason: HOSPADM

## 2024-02-28 RX ORDER — FLUMAZENIL 0.1 MG/ML
0.2 INJECTION INTRAVENOUS AS NEEDED
Status: DISCONTINUED | OUTPATIENT
Start: 2024-02-28 | End: 2024-02-28 | Stop reason: HOSPADM

## 2024-02-28 RX ORDER — SODIUM CHLORIDE 0.9 % (FLUSH) 0.9 %
3-10 SYRINGE (ML) INJECTION AS NEEDED
Status: DISCONTINUED | OUTPATIENT
Start: 2024-02-28 | End: 2024-02-28 | Stop reason: HOSPADM

## 2024-02-28 RX ORDER — HYDROMORPHONE HYDROCHLORIDE 1 MG/ML
0.5 INJECTION, SOLUTION INTRAMUSCULAR; INTRAVENOUS; SUBCUTANEOUS
Status: DISCONTINUED | OUTPATIENT
Start: 2024-02-28 | End: 2024-02-28 | Stop reason: HOSPADM

## 2024-02-28 RX ORDER — CEFAZOLIN SODIUM 2 G/100ML
2000 INJECTION, SOLUTION INTRAVENOUS ONCE
Status: COMPLETED | OUTPATIENT
Start: 2024-02-28 | End: 2024-02-28

## 2024-02-28 RX ORDER — SODIUM CHLORIDE 0.9 % (FLUSH) 0.9 %
10 SYRINGE (ML) INJECTION AS NEEDED
Status: DISCONTINUED | OUTPATIENT
Start: 2024-02-28 | End: 2024-02-28 | Stop reason: HOSPADM

## 2024-02-28 RX ORDER — NALOXONE HCL 0.4 MG/ML
0.2 VIAL (ML) INJECTION AS NEEDED
Status: DISCONTINUED | OUTPATIENT
Start: 2024-02-28 | End: 2024-02-28 | Stop reason: HOSPADM

## 2024-02-28 RX ORDER — HYDROMORPHONE HYDROCHLORIDE 1 MG/ML
0.25 INJECTION, SOLUTION INTRAMUSCULAR; INTRAVENOUS; SUBCUTANEOUS
Status: DISCONTINUED | OUTPATIENT
Start: 2024-02-28 | End: 2024-02-28 | Stop reason: HOSPADM

## 2024-02-28 RX ORDER — DIPHENHYDRAMINE HYDROCHLORIDE 50 MG/ML
12.5 INJECTION INTRAMUSCULAR; INTRAVENOUS
Status: DISCONTINUED | OUTPATIENT
Start: 2024-02-28 | End: 2024-02-28 | Stop reason: HOSPADM

## 2024-02-28 RX ORDER — OXYCODONE HYDROCHLORIDE AND ACETAMINOPHEN 5; 325 MG/1; MG/1
1 TABLET ORAL EVERY 4 HOURS PRN
Status: DISCONTINUED | OUTPATIENT
Start: 2024-02-28 | End: 2024-02-29 | Stop reason: HOSPADM

## 2024-02-28 RX ORDER — HYDRALAZINE HYDROCHLORIDE 20 MG/ML
10 INJECTION INTRAMUSCULAR; INTRAVENOUS
Status: DISCONTINUED | OUTPATIENT
Start: 2024-02-28 | End: 2024-02-28 | Stop reason: HOSPADM

## 2024-02-28 RX ORDER — SODIUM CHLORIDE, SODIUM LACTATE, POTASSIUM CHLORIDE, CALCIUM CHLORIDE 600; 310; 30; 20 MG/100ML; MG/100ML; MG/100ML; MG/100ML
9 INJECTION, SOLUTION INTRAVENOUS CONTINUOUS
Status: DISCONTINUED | OUTPATIENT
Start: 2024-02-28 | End: 2024-02-29 | Stop reason: HOSPADM

## 2024-02-28 RX ORDER — PROMETHAZINE HYDROCHLORIDE 12.5 MG/1
12.5 TABLET ORAL EVERY 6 HOURS PRN
Status: DISCONTINUED | OUTPATIENT
Start: 2024-02-28 | End: 2024-02-29 | Stop reason: HOSPADM

## 2024-02-28 RX ORDER — MONTELUKAST SODIUM 10 MG/1
10 TABLET ORAL NIGHTLY
Status: DISCONTINUED | OUTPATIENT
Start: 2024-02-28 | End: 2024-02-29 | Stop reason: HOSPADM

## 2024-02-28 RX ORDER — BUPROPION HYDROCHLORIDE 150 MG/1
150 TABLET ORAL DAILY
Status: DISCONTINUED | OUTPATIENT
Start: 2024-02-28 | End: 2024-02-29 | Stop reason: HOSPADM

## 2024-02-28 RX ORDER — PHENAZOPYRIDINE HYDROCHLORIDE 200 MG/1
200 TABLET, FILM COATED ORAL ONCE
Status: COMPLETED | OUTPATIENT
Start: 2024-02-28 | End: 2024-02-28

## 2024-02-28 RX ORDER — AMLODIPINE BESYLATE 10 MG/1
10 TABLET ORAL
Status: DISCONTINUED | OUTPATIENT
Start: 2024-02-28 | End: 2024-02-29 | Stop reason: HOSPADM

## 2024-02-28 RX ORDER — LIDOCAINE HYDROCHLORIDE 10 MG/ML
0.5 INJECTION, SOLUTION INFILTRATION; PERINEURAL ONCE AS NEEDED
Status: DISCONTINUED | OUTPATIENT
Start: 2024-02-28 | End: 2024-02-28 | Stop reason: HOSPADM

## 2024-02-28 RX ORDER — ESCITALOPRAM OXALATE 20 MG/1
20 TABLET ORAL DAILY
Status: DISCONTINUED | OUTPATIENT
Start: 2024-02-28 | End: 2024-02-29 | Stop reason: HOSPADM

## 2024-02-28 RX ORDER — ROCURONIUM BROMIDE 10 MG/ML
INJECTION, SOLUTION INTRAVENOUS AS NEEDED
Status: DISCONTINUED | OUTPATIENT
Start: 2024-02-28 | End: 2024-02-28 | Stop reason: SURG

## 2024-02-28 RX ORDER — CETIRIZINE HYDROCHLORIDE 10 MG/1
10 TABLET ORAL DAILY
Status: DISCONTINUED | OUTPATIENT
Start: 2024-02-28 | End: 2024-02-29 | Stop reason: HOSPADM

## 2024-02-28 RX ORDER — FENTANYL CITRATE 50 UG/ML
50 INJECTION, SOLUTION INTRAMUSCULAR; INTRAVENOUS ONCE AS NEEDED
Status: DISCONTINUED | OUTPATIENT
Start: 2024-02-28 | End: 2024-02-28 | Stop reason: HOSPADM

## 2024-02-28 RX ORDER — DOCUSATE SODIUM 100 MG/1
100 CAPSULE, LIQUID FILLED ORAL 2 TIMES DAILY
Status: DISCONTINUED | OUTPATIENT
Start: 2024-02-28 | End: 2024-02-29 | Stop reason: HOSPADM

## 2024-02-28 RX ORDER — LABETALOL HYDROCHLORIDE 5 MG/ML
10 INJECTION, SOLUTION INTRAVENOUS
Status: DISCONTINUED | OUTPATIENT
Start: 2024-02-28 | End: 2024-02-28 | Stop reason: HOSPADM

## 2024-02-28 RX ORDER — KETOROLAC TROMETHAMINE 30 MG/ML
INJECTION, SOLUTION INTRAMUSCULAR; INTRAVENOUS AS NEEDED
Status: DISCONTINUED | OUTPATIENT
Start: 2024-02-28 | End: 2024-02-28 | Stop reason: SURG

## 2024-02-28 RX ORDER — LEVOTHYROXINE SODIUM 0.12 MG/1
125 TABLET ORAL
Status: DISCONTINUED | OUTPATIENT
Start: 2024-02-29 | End: 2024-02-29 | Stop reason: HOSPADM

## 2024-02-28 RX ORDER — VALSARTAN 320 MG/1
320 TABLET ORAL
Status: DISCONTINUED | OUTPATIENT
Start: 2024-02-28 | End: 2024-02-29 | Stop reason: HOSPADM

## 2024-02-28 RX ADMIN — HYDROMORPHONE HYDROCHLORIDE 0.25 MG: 1 INJECTION, SOLUTION INTRAMUSCULAR; INTRAVENOUS; SUBCUTANEOUS at 14:48

## 2024-02-28 RX ADMIN — MONTELUKAST SODIUM 10 MG: 10 TABLET, FILM COATED ORAL at 21:20

## 2024-02-28 RX ADMIN — KETOROLAC TROMETHAMINE 30 MG: 30 INJECTION, SOLUTION INTRAMUSCULAR at 14:01

## 2024-02-28 RX ADMIN — AMLODIPINE BESYLATE 10 MG: 10 TABLET ORAL at 19:40

## 2024-02-28 RX ADMIN — MIDAZOLAM 0.5 MG: 1 INJECTION INTRAMUSCULAR; INTRAVENOUS at 09:45

## 2024-02-28 RX ADMIN — ROCURONIUM BROMIDE 10 MG: 10 INJECTION, SOLUTION INTRAVENOUS at 12:13

## 2024-02-28 RX ADMIN — VALSARTAN 320 MG: 320 TABLET, FILM COATED ORAL at 19:40

## 2024-02-28 RX ADMIN — CETIRIZINE HYDROCHLORIDE 10 MG: 10 TABLET ORAL at 17:29

## 2024-02-28 RX ADMIN — LIDOCAINE HYDROCHLORIDE 80 MG: 20 INJECTION, SOLUTION INFILTRATION; PERINEURAL at 10:41

## 2024-02-28 RX ADMIN — METFORMIN HYDROCHLORIDE: 500 TABLET, EXTENDED RELEASE ORAL at 21:20

## 2024-02-28 RX ADMIN — PROPOFOL 150 MG: 10 INJECTION, EMULSION INTRAVENOUS at 10:41

## 2024-02-28 RX ADMIN — ONDANSETRON 4 MG: 2 INJECTION INTRAMUSCULAR; INTRAVENOUS at 15:27

## 2024-02-28 RX ADMIN — SODIUM CHLORIDE 100 ML/HR: 9 INJECTION, SOLUTION INTRAVENOUS at 17:31

## 2024-02-28 RX ADMIN — SODIUM CHLORIDE, POTASSIUM CHLORIDE, SODIUM LACTATE AND CALCIUM CHLORIDE 9 ML/HR: 600; 310; 30; 20 INJECTION, SOLUTION INTRAVENOUS at 09:47

## 2024-02-28 RX ADMIN — ONDANSETRON 4 MG: 2 INJECTION INTRAMUSCULAR; INTRAVENOUS at 14:01

## 2024-02-28 RX ADMIN — PHENAZOPYRIDINE 200 MG: 200 TABLET ORAL at 09:45

## 2024-02-28 RX ADMIN — ROCURONIUM BROMIDE 10 MG: 10 INJECTION, SOLUTION INTRAVENOUS at 11:41

## 2024-02-28 RX ADMIN — ALLOPURINOL 100 MG: 100 TABLET ORAL at 17:29

## 2024-02-28 RX ADMIN — SUGAMMADEX 200 MG: 100 INJECTION, SOLUTION INTRAVENOUS at 14:01

## 2024-02-28 RX ADMIN — IBUPROFEN 400 MG: 400 TABLET, FILM COATED ORAL at 17:34

## 2024-02-28 RX ADMIN — Medication 20 MG: at 10:38

## 2024-02-28 RX ADMIN — DOCUSATE SODIUM 100 MG: 100 CAPSULE, LIQUID FILLED ORAL at 21:20

## 2024-02-28 RX ADMIN — DEXAMETHASONE SODIUM PHOSPHATE 8 MG: 4 INJECTION, SOLUTION INTRAMUSCULAR; INTRAVENOUS at 10:47

## 2024-02-28 RX ADMIN — ROCURONIUM BROMIDE 10 MG: 10 INJECTION, SOLUTION INTRAVENOUS at 12:54

## 2024-02-28 RX ADMIN — GABAPENTIN 300 MG: 300 CAPSULE ORAL at 21:20

## 2024-02-28 RX ADMIN — OXYCODONE AND ACETAMINOPHEN 1 TABLET: 5; 325 TABLET ORAL at 23:23

## 2024-02-28 RX ADMIN — TRAMADOL HYDROCHLORIDE 50 MG: 50 TABLET ORAL at 21:20

## 2024-02-28 RX ADMIN — SODIUM CHLORIDE, POTASSIUM CHLORIDE, SODIUM LACTATE AND CALCIUM CHLORIDE: 600; 310; 30; 20 INJECTION, SOLUTION INTRAVENOUS at 11:17

## 2024-02-28 RX ADMIN — CEFAZOLIN SODIUM 2000 MG: 2 INJECTION, SOLUTION INTRAVENOUS at 10:28

## 2024-02-28 RX ADMIN — HYDROMORPHONE HYDROCHLORIDE 0.5 MG: 1 INJECTION, SOLUTION INTRAMUSCULAR; INTRAVENOUS; SUBCUTANEOUS at 10:38

## 2024-02-28 RX ADMIN — TRAMADOL HYDROCHLORIDE 50 MG: 50 TABLET ORAL at 15:27

## 2024-02-28 RX ADMIN — IBUPROFEN 400 MG: 400 TABLET, FILM COATED ORAL at 23:24

## 2024-02-28 RX ADMIN — BUPROPION HYDROCHLORIDE 150 MG: 150 TABLET, EXTENDED RELEASE ORAL at 17:30

## 2024-02-28 RX ADMIN — ESCITALOPRAM 20 MG: 20 TABLET, FILM COATED ORAL at 17:30

## 2024-02-28 RX ADMIN — HYDROMORPHONE HYDROCHLORIDE 0.25 MG: 1 INJECTION, SOLUTION INTRAMUSCULAR; INTRAVENOUS; SUBCUTANEOUS at 14:38

## 2024-02-28 RX ADMIN — Medication 10 MG: at 11:40

## 2024-02-28 RX ADMIN — ROCURONIUM BROMIDE 10 MG: 10 INJECTION, SOLUTION INTRAVENOUS at 11:14

## 2024-02-28 RX ADMIN — FAMOTIDINE 20 MG: 10 INJECTION INTRAVENOUS at 09:45

## 2024-02-28 RX ADMIN — CEFAZOLIN SODIUM 2000 MG: 2 INJECTION, SOLUTION INTRAVENOUS at 18:14

## 2024-02-28 RX ADMIN — MIDAZOLAM 0.5 MG: 1 INJECTION INTRAMUSCULAR; INTRAVENOUS at 09:50

## 2024-02-28 RX ADMIN — ROCURONIUM BROMIDE 50 MG: 10 INJECTION, SOLUTION INTRAVENOUS at 10:42

## 2024-02-28 NOTE — ANESTHESIA PREPROCEDURE EVALUATION
Anesthesia Evaluation     Patient summary reviewed and Nursing notes reviewed   history of anesthetic complications (awareness):   NPO Solid Status: > 8 hours  NPO Liquid Status: > 4 hours           Airway   Mallampati: II  Neck ROM: full  No difficulty expected  Dental - normal exam     Pulmonary     breath sounds clear to auscultation  (+) ,shortness of breath  Cardiovascular     Rhythm: regular    (+) hypertension, hyperlipidemia      Neuro/Psych  (+) headaches, dizziness/light headedness, numbness, psychiatric history Anxiety  GI/Hepatic/Renal/Endo    (+) obesity, GERD, liver disease history of elevated LFT, diabetes mellitus, thyroid problem hypothyroidism    Musculoskeletal     Abdominal   (+) obese   Substance History      OB/GYN          Other   arthritis,   history of cancer                Anesthesia Plan    ASA 3     general     intravenous induction     Anesthetic plan, risks, benefits, and alternatives have been provided, discussed and informed consent has been obtained with: patient.    CODE STATUS:

## 2024-02-28 NOTE — PLAN OF CARE
COMBINED ANTEROPOSTERIOR COLPORRHAPHY WITH ENTEROCELE REPAIR, VAGINAL APPROACH N/A General   PUBOVAGINAL SLING; EXTRAPERITONEL COLPOPEXY SACROSPINOUS LIGAMENT FIXATION; VAGINAL ENTEROCELE REPAIR; CYSTOURETHROSCOPY

## 2024-02-28 NOTE — ANESTHESIA PROCEDURE NOTES
Airway  Urgency: elective    Date/Time: 2/28/2024 10:45 AM  Airway not difficult    General Information and Staff    Patient location during procedure: OR  Anesthesiologist: Misael Alonso MD    Indications and Patient Condition  Indications for airway management: airway protection    Preoxygenated: yes  Mask difficulty assessment: 1 - vent by mask    Final Airway Details  Final airway type: endotracheal airway      Successful airway: ETT  Cuffed: yes   Successful intubation technique: direct laryngoscopy  Endotracheal tube insertion site: oral  Blade: Jesse  Blade size: 3  ETT size (mm): 7.0  Cormack-Lehane Classification: grade IIa - partial view of glottis  Placement verified by: chest auscultation and capnometry   Measured from: lips  ETT/EBT  to lips (cm): 21  Number of attempts at approach: 1  Assessment: lips, teeth, and gum same as pre-op and atraumatic intubation

## 2024-02-28 NOTE — OP NOTE
OPERATIVE/PROCEDURE REPORT     [facility] MAIN OR     PATIENT NAME:    Jose Keys     : 1950     DATE OF OPERATIONS:  2024     PREOPERATIVE DIAGNOSES:   1. 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 Cystocele, N81.11.  3. Rectocele, N81.6.   4. Enterocele, N81.5.   5. Loss of perineal body, N81.89  6. Stress urinary incontinence, N39.3  7. Incomplete defecation, R15.0  8. Incompetence of rectovaginal tissue, N81.83     POSTOPERATIVE DIAGNOSES: same       SURGEON: Annmarie Kirby M.D.                ASSISTANT: Harmony Jimenes CST     PROCEDURE(S) PERFORMED:   1.   Anterior colporrhaphy  2.   Posterior colporrhaphy  3.   Enterocele repair, vaginal, combined 27810 (components 96273, 96890 and 00967)  4.   Pubovaginal sling, retropubic porcine, 76662  5.   Extraperitoneal colpopexy sacrospinous ligament fixation, 95550  6.   Insertion of vaginal graft, 78197 add on to 97943 component of 19229    7.   Cystourethroscopy      FINDING(S): On cystourethroscopy, following all the procedures, there is bilateral efflux of Pyridium stained urine from both the right and the left ureteral orifices. There are no lesions or foreign material of the bladder or the urethra.     SPECIMEN(S): none     DESCRIPTION OF PROCEDURE(S): The patient was taken to the Operating Room with a peripheral IV in place. She underwent the induction of general anesthesia, was prepped and draped in the dorsal lithotomy position in [facility]. Cystourethroscopy showed no lesions or foreign material of the bladder or the urethra. There is bilateral efflux of Pyridium stained urine from both the right and the left ureteral orifices. The cystoscope was removed and a Mullins was placed and set to straight drainage. A sub-urethral incision was made and dissected bilaterally.  A porcine graft sheet of Strattice was made into a sling and sewn together with 0 Vicryl suture. The retropubic 1 centi-meter porcine sling was placed in the  retropubic space. Cystourethroscopy with each transvaginal introducer in place showed no lesions or foreign material of the bladder or the urethra. The pubovaginal sling was adjusted without tension so that a curved Barron easily fit between the sub-urethral tissue and the tape and adjusted to where there was no leakage from the urethra with pressure applied with sterile fluid in the bladder. The anterior vaginal wall was opened and the bladder dissected from the vaginal epithelium. 2-0 PDS in interrupted sutures was used to plicate the pubo-cervical fascia in the midline to accomplish the anterior colporrhaphy. The vaginal epithelium was trimmed minimally and the anterior vaginal epithelium was closed with 2-0 Vicryl accomplishing the anterior colporrhaphy.  A posterior vaginal incision was made and the rectum was dissected from the vagina. The enterocele sac was closed with 0 Vicryl in a purse-string fashion to accomplish the vaginal enterocele repair.  The sacrospinous ligaments were identified bilaterally and 0 permanent suture on a Capio Slim was placed in the right sacrospinous ligament and into the left sacrospinous ligament. These sutures were attached to a T-shaped porcine graft and tied down to accomplish the extraperitoneal colpopexy sacrospinous ligament fixation. The inferior portion of the posterior vaginal graft was tied down with 0 Vicryl.  Zero Vicryl interrupted sutures were used to plicate the rectovaginal fascia in the midline to accomplish the posterior colporrhaphy and to reconstruct the perineal body.  The posterior vaginal epithelium was closed with 2-0 Vicryl suture. Cystourethroscopy showed no lesions or foreign material of the bladder or the urethra, and there was bilateral efflux of Pyridium stained urine from both the right and the left ureteral orifices. The suprapubic excess porcine graft was trimmed, and the skin lifted away. These suprapubic incisions were closed with 4-0 Vicryl. The  sub-urethral incision was closed with 2-0 Vicryl and was hemostatic. Cystourethroscopy showed no lesions or foreign material of the bladder or the urethra, and there was bilateral efflux of Pyridium stained urine from both the right and the left ureteral orifices. The cystoscope was removed and a Mullins was placed and set to straight drainage. A vaginal pack was placed into the vagina. The patient was taken out of the dorsal lithotomy position, awakened from general anesthesia, and taken to the Recovery Room in good condition.  All final needle, sponge, and instrument counts were reported as correct. There were no complications to the procedures.        URINE OUTPUT: 50 mL    EBL: 50 mL    FLUIDS:  1600 mL              MARY GRACE LORENZO MD, MSc, FACOG, FACS

## 2024-02-28 NOTE — PLAN OF CARE
Goal Outcome Evaluation:              Outcome Evaluation: VSS. IV fluids infusing. 2 Pubic sites-NIURKA. SCDs on. Spouse at bedside. Mullins catheter in place, orange urine. Voiding trial in AM.

## 2024-02-28 NOTE — H&P
Female Pelvic Medicine and Reconstructive [facility]   History & Physical    Patient Identification:  Name: Jose Keys Age: 73 y.o. Sex: female :  1950 MRN: Female Pelvic Medicine and Reconstructive Surgery   History & Physical    Patient Identification:  Name: Jose Keys Age: 73 y.o. Sex: female :  1950 MRN: [id number]                            Problem List:    Vaginal vault prolapse    Past Medical History:  Past Medical History:   Diagnosis Date    Allergic     foods (soy, gluten, egg yolk)     Anxiety     Arthritis     HANDS    Awareness under anesthesia     WITH HYSTERECTOMY. FELT NO PAIN.    Cancer     BCC AND SQUAMOUS-FACIAL    Cataract     REMOVED    Chronic back pain     Colon polyp     Removed     Depression     Currently seeing therapist through [facility]    Dizziness     GERD (gastroesophageal reflux disease) 2019    Occurs infrequently    Gestational diabetes     Hashimoto's thyroiditis     Headache     History of UTI     HL (hearing loss) 2018    Hyperlipidemia     Hypertension     Hypothyroidism     Low back pain     Neuropathy     Obesity     Seasonal allergies     Shortness of breath     ?JUST HAD CATH-    Type 2 diabetes mellitus     Urinary frequency     Urinary incontinence     Vaginal prolapse     CYSTOCELE, ENTEROCELE RECTOCELE    Visual impairment     Corrected with cataract surgery and glasses    Vitamin D deficiency      Past Surgical History:  Past Surgical History:   Procedure Laterality Date    CARDIAC CATHETERIZATION      CARPAL TUNNEL RELEASE Left     TENDON SURGERY LEFT THUMB    CATARACT EXTRACTION WITH INTRAOCULAR LENS IMPLANT      LEFT AND RIGHT    COLONOSCOPY      COLONOSCOPY      Normal.  Dr. Kaiser    EYE SURGERY      HYSTERECTOMY      MAMMO BILATERAL  2017    MOHS SURGERY  2023    For basal cell skin cancer.   [place]    TUBAL ABDOMINAL LIGATION      UPPER GASTROINTESTINAL ENDOSCOPY      Dr. Kaiser      Home Meds:  No medications  prior to admission.     Current Meds:   No current facility-administered medications for this encounter.    Current Outpatient Medications:     ACCU-CHEK FASTCLIX LANCETS [facility] [place], Use to test BG 2x daily. DX CODE: E11.65, Disp: 200 each, Rfl: 1    allopurinol (Zyloprim) 100 MG tablet, Take 1 tablet by mouth Daily., Disp: 90 tablet, Rfl: 3    amLODIPine-valsartan (EXFORGE)  MG per tablet, Take 1 tablet by mouth Daily. (Patient taking differently: Take 1 tablet by mouth Daily. HOLD 24 HOUR PRIOR TO SURGERY), Disp: 90 tablet, Rfl: 3    buPROPion XL (WELLBUTRIN XL) 300 MG 24 hr tablet, Take 1 tablet by mouth Daily., Disp: , Rfl: 2    carvedilol (COREG) 3.125 MG tablet, Take 1 tablet by mouth 2 (Two) Times a Day With Meals. (Patient taking differently: Take 1 tablet by mouth Every 12 (Twelve) Hours.), Disp: 180 tablet, Rfl: 1    cetirizine (zyrTEC) 10 MG tablet, Take 1 tablet by mouth Daily., Disp: , Rfl:     co-enzyme Q-10 30 MG capsule, Take 1 tablet by mouth Daily. HOLD FOR SURGERY, Disp: , Rfl:     empagliflozin 25 MG tablet 25 mg, metFORMIN  MG tablet sustained-release 24 hour 1,000 mg, Take 1 dose by mouth Daily., Disp: , Rfl:     escitalopram (LEXAPRO) 10 MG tablet, Take 2 tablets by mouth Daily., Disp: , Rfl: 2    estradiol (ESTRACE) 0.1 MG/GM vaginal cream, Insert  into the vagina See Admin Instructions. USE SM DAB DAILY VAG, Disp: , Rfl:     gabapentin (NEURONTIN) 300 MG capsule, Take 1 capsule by mouth 2 (Two) Times a Day., Disp: 180 capsule, Rfl: 1    glucose blood (ACCU-CHEK SMARTVIEW) test strip, Use to test BG 2x daily, Disp: 200 each, Rfl: 1    Lancet Devices (ACCU-CHEK SOFTCLIX) lancets, Use as instructed, Disp: 1 each, Rfl: 0    montelukast (SINGULAIR) 10 MG tablet, Take 1 tablet by mouth Every Night., Disp: , Rfl: 0    Multiple Vitamins-Minerals (MULTIVITAMIN PO), Take 1 tablet by mouth Daily., Disp: , Rfl:     Omega-3 Fatty Acids (fish oil) 1000 MG capsule capsule, Take 2 capsules by mouth 2  (Two) Times a Day. WILL HOLD FOR SURGEYR, Disp: , Rfl:     rosuvastatin (Crestor) 5 MG tablet, Take 1 tablet by mouth Daily., Disp: 90 tablet, Rfl: 3    Synthroid 125 MCG tablet, TAKE 1 TABLET BY MOUTH DAILY, Disp: 90 tablet, Rfl: 1  Allergies:  Allergies   Allergen Reactions    Elemental Sulfur Anaphylaxis    Sulfa Antibiotics Anaphylaxis    Atorvastatin Other (See Comments)     WEAKNESS LEGS    Azithromycin Swelling    Erythromycin Swelling    Ezetimibe-Simvastatin Myalgia    Hydrocodone-Acetaminophen Swelling    Praluent [Alirocumab] Other (See Comments)     STATES NOT ALLERGY. INSURANCE WOULD NOT PAY    Ropinirole Hcl Other (See Comments)     HALLUCINATIONS    Simvastatin     Statins Myalgia    Zetia [Ezetimibe] Other (See Comments)     HEART POUND     Immunizations:  Immunization History   Administered Date(s) Administered    COVID-19 (MODERNA) 1st,2nd,3rd Dose Monovalent 2021, 2021    Influenza Split Preservative Free ID [id number]    Zostavax 2014     Social History:   Social History     Tobacco Use    Smoking status: Never     Passive exposure: Never    Smokeless tobacco: Never   Substance Use Topics    Alcohol use: Not Currently     Comment: socially       Family History:  Family History   Problem Relation Age of Onset    Diabetes Father     Hypertension Father     Heart disease Father     Hyperlipidemia Father     Alcohol abuse Father     Diabetes Sister     Thyroid disease Sister     Developmental Disability Sister     Cancer Sister         Colon cancer    Diabetes Sister         Colon cancer      Alcohol abuse Sister     Depression Sister     Cancer Daughter         Colon [id number]/surgery/cancer free now    Malig Hyperthermia Neg Hx         Review of Systems  Constitutional:  Denies fever or chills   Eyes:  Denies change in visual acuity   HEENT:  Denies nasal congestion or sore throat   Respiratory:  Denies cough or shortness of breath   Cardiovascular:  Denies chest pain or  edema   GI:  Denies abdominal pain, nausea, vomiting, bloody stools or diarrhea   :  Denies dysuria   Musculoskeletal:  Denies back pain or joint pain   Integument:  Denies rash   Neurologic:  Denies headache, focal weakness or sensory changes   Endocrine:  Denies polyuria or polydipsia   Lymphatic:  Denies swollen glands   Psychiatric:  Denies depression or anxiety       Objective:  tMax 24 hrs: No data recorded.    Vitals Ranges:        Exam:  Vital Signs: There were no vitals taken for this visit.  Constitutional:  Well developed, well nourished, no acute distress, non-toxic appearance    GI:  Soft, nondistended, normal bowel sounds, nontender, no organomegaly, no mass, no rebound, no guarding   :  No costovertebral angle tenderness   Musculoskeletal:  No edema, no tenderness, no deformities. Back- no tenderness  Integument:  Well hydrated, no rash   Lymphatic:  No lymphadenopathy noted   Neurologic:  Alert & oriented x 3,  Pelvic:           Assessment:    Vaginal vault prolapse          Plan:  COMBINED ANTEROPOSTERIOR COLPORRHAPHY WITH ENTEROCELE REPAIR, VAGINAL APPROACH N/A General   PUBOVAGINAL SLING; EXTRAPERITONEL COLPOPEXY SACROSPINOUS LIGAMENT FIXATION; VAGINAL ENTEROCELE REPAIR; CYSTOURETHROSCOPY         Annmarie Kirby MD  2/7/2024

## 2024-02-28 NOTE — H&P
Female Pelvic Medicine and Reconstructive Surgery   History & Physical    Patient Identification:  Name: Jose Keys Age: 73 y.o. Sex: female :  1950 MRN: Female Pelvic Medicine and Reconstructive Surgery   History & Physical    Patient Identification:  Name: Jose Keys Age: 73 y.o. Sex: female :  1950 MRN: [id number]                            Problem List:    Vaginal vault prolapse    Past Medical History:  Past Medical History:   Diagnosis Date    Allergic     foods (soy, gluten, egg yolk)     Anxiety     Arthritis     HANDS    Awareness under anesthesia     WITH HYSTERECTOMY. FELT NO PAIN.    Cancer     BCC AND SQUAMOUS-FACIAL    Cataract     REMOVED    Chronic back pain     Colon polyp     Removed     Depression     Currently seeing therapist through [facility]    Dizziness     GERD (gastroesophageal reflux disease) 2019    Occurs infrequently    Gestational diabetes     Hashimoto's thyroiditis     Headache     History of UTI     HL (hearing loss) 2018    Hyperlipidemia     Hypertension     Hypothyroidism     Low back pain     Neuropathy     Obesity     Seasonal allergies     Shortness of breath     ?JUST HAD CATH-    Type 2 diabetes mellitus     Urinary frequency     Urinary incontinence     Vaginal prolapse     CYSTOCELE, ENTEROCELE RECTOCELE    Visual impairment     Corrected with cataract surgery and glasses    Vitamin D deficiency      Past Surgical History:  Past Surgical History:   Procedure Laterality Date    CARDIAC CATHETERIZATION      CARPAL TUNNEL RELEASE Left     TENDON SURGERY LEFT THUMB    CATARACT EXTRACTION WITH INTRAOCULAR LENS IMPLANT      LEFT AND RIGHT    COLONOSCOPY      COLONOSCOPY      Normal.  Dr. Kaiser    EYE SURGERY      HYSTERECTOMY      MAMMO BILATERAL  2017    MOHS SURGERY  2023    For basal cell skin cancer.   [place]    TUBAL ABDOMINAL LIGATION      UPPER GASTROINTESTINAL ENDOSCOPY      Dr. Kaiser      Home Meds:  Medications Prior  to Admission   Medication Sig Dispense Refill Last Dose    allopurinol (Zyloprim) 100 MG tablet Take 1 tablet by mouth Daily. 90 tablet 3 Past Week    amLODIPine-valsartan (EXFORGE)  MG per tablet Take 1 tablet by mouth Daily. (Patient taking differently: Take 1 tablet by mouth Daily. HOLD 24 HOUR PRIOR TO SURGERY) 90 tablet 3 Past Week    buPROPion XL (WELLBUTRIN XL) 300 MG 24 hr tablet Take 1 tablet by mouth Daily.  2 2/27/2024 at 1900    cetirizine (zyrTEC) 10 MG tablet Take 1 tablet by mouth Daily.   2/27/2024 at 1800    co-enzyme Q-10 30 MG capsule Take 1 tablet by mouth Daily. HOLD FOR SURGERY   Past Week    empagliflozin 25 MG tablet 25 mg, metFORMIN  MG tablet sustained-release 24 hour 1,000 mg Take 1 dose by mouth Daily.   Past Week    escitalopram (LEXAPRO) 10 MG tablet Take 2 tablets by mouth Daily.  2 2/27/2024 at 1800    estradiol (ESTRACE) 0.1 MG/GM vaginal cream Insert  into the vagina See Admin Instructions. USE SM DAB DAILY VAG   2/27/2024 at 2200    gabapentin (NEURONTIN) 300 MG capsule Take 1 capsule by mouth 2 (Two) Times a Day. 180 capsule 1 Past Week    montelukast (SINGULAIR) 10 MG tablet Take 1 tablet by mouth Every Night.  0 Past Week    Multiple Vitamins-Minerals (MULTIVITAMIN PO) Take 1 tablet by mouth Daily.   Past Week    Omega-3 Fatty Acids (fish oil) 1000 MG capsule capsule Take 2 capsules by mouth 2 (Two) Times a Day. WILL HOLD FOR SURGEYR   2/27/2024    rosuvastatin (Crestor) 5 MG tablet Take 1 tablet by mouth Daily. 90 tablet 3 Past Week    Synthroid 125 MCG tablet TAKE 1 TABLET BY MOUTH DAILY 90 tablet 1 2/28/2024 at 0530    ACCU-CHEK FASTCLIX LANCETS [facility] Use to test BG 2x daily. DX CODE: E11.65 200 each 1 Unknown    carvedilol (COREG) 3.125 MG tablet Take 1 tablet by mouth 2 (Two) Times a Day With Meals. (Patient taking differently: Take 1 tablet by mouth Every 12 (Twelve) Hours.) 180 tablet 1 2/26/2024 at 2300    glucose blood (ACCU-CHEK SMARTVIEW) test strip Use to  test BG 2x daily 200 each 1 Unknown    Lancet Devices (ACCU-CHEK SOFTCLIX) lancets Use as instructed 1 each 0 Unknown     Current Meds:     Current Facility-Administered Medications:     ceFAZolin in dextrose (ANCEF) IVPB solution 2,000 mg, 2,000 mg, Intravenous, Once, Annmarie Kirby MD    fentaNYL citrate (PF) (SUBLIMAZE) injection 50 mcg, 50 mcg, Intravenous, Once PRN, Aman Alcala MD    lactated ringers infusion, 9 mL/hr, Intravenous, Continuous, Aman Alcala MD, Last Rate: 9 mL/hr at 02/28/24 0947, 9 mL/hr at 02/28/24 0947    lidocaine (XYLOCAINE) 1 % injection 0.5 mL, 0.5 mL, Intradermal, Once PRN, Aman Alcala MD    sodium chloride 0.9 % flush 10 mL, 10 mL, Intravenous, Q12H, Annmarie Kirby MD    sodium chloride 0.9 % flush 10 mL, 10 mL, Intravenous, PRN, Annmarie Kirby MD    sodium chloride 0.9 % flush 3 mL, 3 mL, Intravenous, Q12H, Aman Alcala MD    sodium chloride 0.9 % flush 3-10 mL, 3-10 mL, Intravenous, PRN, Aman Alcala MD    sodium chloride 0.9 % infusion 40 mL, 40 mL, Intravenous, PRVIN, Annmarie Kirby MD  Allergies:  Allergies   Allergen Reactions    Elemental Sulfur Anaphylaxis    Sulfa Antibiotics Anaphylaxis    Atorvastatin Other (See Comments)     WEAKNESS LEGS    Azithromycin Swelling    Erythromycin Swelling    Ezetimibe-Simvastatin Myalgia    Hydrocodone-Acetaminophen Swelling    Praluent [Alirocumab] Other (See Comments)     STATES NOT ALLERGY. INSURANCE WOULD NOT PAY    Ropinirole Hcl Other (See Comments)     HALLUCINATIONS    Simvastatin     Statins Myalgia    Zetia [Ezetimibe] Other (See Comments)     HEART POUND     Immunizations:  Immunization History   Administered Date(s) Administered    COVID-19 (MODERNA) 1st,2nd,3rd Dose Monovalent 04/13/2021, 05/11/2021    Influenza Split Preservative Free ID 09/13/2012    Zostavax 01/01/2014     Social History:   Social History     Tobacco Use    Smoking status: Never     Passive exposure:  Never    Smokeless tobacco: Never   Substance Use Topics    Alcohol use: Not Currently     Comment: socially       Family History:  Family History   Problem Relation Age of Onset    Diabetes Father     Hypertension Father     Heart disease Father     Hyperlipidemia Father     Alcohol abuse Father     Diabetes Sister     Thyroid disease Sister     Developmental Disability Sister     Cancer Sister         Colon cancer    Diabetes Sister         Colon cancer  2007    Alcohol abuse Sister     Depression Sister     Cancer Daughter         Colon [id number]/surgery/cancer free now    Malig Hyperthermia Neg Hx         Review of Systems  Constitutional:  Denies fever or chills   Eyes:  Denies change in visual acuity   HEENT:  Denies nasal congestion or sore throat   Respiratory:  Denies cough or shortness of breath   Cardiovascular:  Denies chest pain or edema   GI:  Denies abdominal pain, nausea, vomiting, bloody stools or diarrhea   :  Denies dysuria   Musculoskeletal:  Denies back pain or joint pain   Integument:  Denies rash   Neurologic:  Denies headache, focal weakness or sensory changes   Endocrine:  Denies polyuria or polydipsia   Lymphatic:  Denies swollen glands   Psychiatric:  Denies depression or anxiety       Objective:  tMax 24 hrs: Temp (24hrs), Av.1 °F (36.7 °C), Min:98.1 °F (36.7 °C), Max:98.1 °F (36.7 °C)    Vitals Ranges:   Temp:  [98.1 °F (36.7 °C)] 98.1 °F (36.7 °C)  Heart Rate:  [61-71] 64  Resp:  [16] (P) 16  BP: (124-190)/(66-93) 133/73    Exam:  Vital Signs: BP (P) 133/73 (BP Location: Right arm, Patient Position: Lying)   Pulse 64   Temp 98.1 °F (36.7 °C) (Oral)   Resp (P) 16   SpO2 96%   Constitutional:  Well developed, well nourished, no acute distress, non-toxic appearance    GI:  Soft, nondistended, normal bowel sounds, nontender, no organomegaly, no mass, no rebound, no guarding   :  No costovertebral angle tenderness   Musculoskeletal:  No edema, no tenderness, no deformities.  Back- no tenderness  Integument:  Well hydrated, no rash   Lymphatic:  No lymphadenopathy noted   Neurologic:  Alert & oriented x 3,  Pelvic:     Assessment:    Vaginal vault prolapse    Plan:    COMBINED ANTEROPOSTERIOR COLPORRHAPHY WITH ENTEROCELE REPAIR, VAGINAL APPROACH.  PUBOVAGINAL SLING;   EXTRAPERITONEL COLPOPEXY SACROSPINOUS LIGAMENT FIXATION;   VAGINAL ENTEROCELE REPAIR; .CYSTOURETHROSCOPY          Annmarie Kirby MD  2/28/2024

## 2024-02-28 NOTE — ANESTHESIA POSTPROCEDURE EVALUATION
Patient: Jose Keys    Procedure Summary       Date: 02/28/24 Room / Location: [facility] OR [address] [place] [address] MAIN OR    Anesthesia Start: 1034 Anesthesia Stop: 1419    Procedures:       COMBINED ANTEROPOSTERIOR COLPORRHAPHY WITH ENTEROCELE REPAIR, VAGINAL APPROACH (Vagina)      PUBOVAGINAL SLING; EXTRAPERITONEL COLPOPEXY SACROSPINOUS LIGAMENT FIXATION; VAGINAL ENTEROCELE REPAIR; CYSTOURETHROSCOPY (Vagina) Diagnosis:     Surgeons: Annmarie Kirby MD Provider: Misael Alonso MD    Anesthesia Type: general ASA Status: 3            Anesthesia Type: general    Vitals  Vitals Value Taken Time   /64 02/28/24 1515   Temp 36.7 °C (98.1 °F) 02/28/24 1415   Pulse 61 02/28/24 1518   Resp     SpO2 96 % 02/28/24 1518   Vitals shown include unfiled device data.        Post Anesthesia Care and Evaluation    Patient location during evaluation: bedside  Patient participation: complete - patient participated  Level of consciousness: awake  Pain management: adequate    Airway patency: patent  Anesthetic complications: No anesthetic complications  PONV Status: controlled  Cardiovascular status: acceptable  Respiratory status: acceptable  Hydration status: acceptable    Comments: /66   Pulse 62   Temp 36.7 °C (98.1 °F) (Oral)   Resp 16   SpO2 96%

## 2024-02-29 ENCOUNTER — READMISSION MANAGEMENT (OUTPATIENT)
Dept: CALL CENTER | Facility: HOSPITAL | Age: 74
End: 2024-02-29
Payer: MEDICARE

## 2024-02-29 VITALS
SYSTOLIC BLOOD PRESSURE: 122 MMHG | HEART RATE: 64 BPM | TEMPERATURE: 98 F | DIASTOLIC BLOOD PRESSURE: 75 MMHG | RESPIRATION RATE: 16 BRPM | OXYGEN SATURATION: 95 %

## 2024-02-29 LAB
HCT VFR BLD AUTO: 32.4 % (ref 34–46.6)
HGB BLD-MCNC: 10.9 G/DL (ref 12–15.9)

## 2024-02-29 PROCEDURE — 25010000002 CEFAZOLIN IN DEXTROSE 2-4 GM/100ML-% SOLUTION: Performed by: OBSTETRICS & GYNECOLOGY

## 2024-02-29 PROCEDURE — 85018 HEMOGLOBIN: CPT | Performed by: OBSTETRICS & GYNECOLOGY

## 2024-02-29 PROCEDURE — 85014 HEMATOCRIT: CPT | Performed by: OBSTETRICS & GYNECOLOGY

## 2024-02-29 PROCEDURE — G0378 HOSPITAL OBSERVATION PER HR: HCPCS

## 2024-02-29 PROCEDURE — 63710000001 ONDANSETRON ODT 4 MG TABLET DISPERSIBLE: Performed by: OBSTETRICS & GYNECOLOGY

## 2024-02-29 PROCEDURE — 25810000003 SODIUM CHLORIDE 0.9 % SOLUTION: Performed by: OBSTETRICS & GYNECOLOGY

## 2024-02-29 PROCEDURE — 25010000002 ENOXAPARIN PER 10 MG: Performed by: OBSTETRICS & GYNECOLOGY

## 2024-02-29 RX ADMIN — CARVEDILOL 3.12 MG: 3.12 TABLET, FILM COATED ORAL at 07:48

## 2024-02-29 RX ADMIN — SODIUM CHLORIDE 100 ML/HR: 9 INJECTION, SOLUTION INTRAVENOUS at 02:42

## 2024-02-29 RX ADMIN — DOCUSATE SODIUM 100 MG: 100 CAPSULE, LIQUID FILLED ORAL at 07:48

## 2024-02-29 RX ADMIN — IBUPROFEN 400 MG: 400 TABLET, FILM COATED ORAL at 06:13

## 2024-02-29 RX ADMIN — ALLOPURINOL 100 MG: 100 TABLET ORAL at 07:48

## 2024-02-29 RX ADMIN — IBUPROFEN 400 MG: 400 TABLET, FILM COATED ORAL at 18:23

## 2024-02-29 RX ADMIN — METFORMIN HYDROCHLORIDE: 500 TABLET, EXTENDED RELEASE ORAL at 07:49

## 2024-02-29 RX ADMIN — TRAMADOL HYDROCHLORIDE 50 MG: 50 TABLET ORAL at 15:55

## 2024-02-29 RX ADMIN — LEVOTHYROXINE SODIUM 125 MCG: 125 TABLET ORAL at 06:13

## 2024-02-29 RX ADMIN — AMLODIPINE BESYLATE 10 MG: 10 TABLET ORAL at 10:06

## 2024-02-29 RX ADMIN — ONDANSETRON 4 MG: 4 TABLET, ORALLY DISINTEGRATING ORAL at 15:59

## 2024-02-29 RX ADMIN — CARVEDILOL 3.12 MG: 3.12 TABLET, FILM COATED ORAL at 18:23

## 2024-02-29 RX ADMIN — CEFAZOLIN SODIUM 2000 MG: 2 INJECTION, SOLUTION INTRAVENOUS at 03:58

## 2024-02-29 RX ADMIN — ESCITALOPRAM 20 MG: 20 TABLET, FILM COATED ORAL at 07:48

## 2024-02-29 RX ADMIN — SODIUM CHLORIDE 100 ML/HR: 9 INJECTION, SOLUTION INTRAVENOUS at 13:25

## 2024-02-29 RX ADMIN — TRAMADOL HYDROCHLORIDE 50 MG: 50 TABLET ORAL at 07:48

## 2024-02-29 RX ADMIN — IBUPROFEN 400 MG: 400 TABLET, FILM COATED ORAL at 12:04

## 2024-02-29 RX ADMIN — CETIRIZINE HYDROCHLORIDE 10 MG: 10 TABLET ORAL at 07:49

## 2024-02-29 RX ADMIN — ENOXAPARIN SODIUM 40 MG: 100 INJECTION SUBCUTANEOUS at 07:49

## 2024-02-29 RX ADMIN — GABAPENTIN 300 MG: 300 CAPSULE ORAL at 07:48

## 2024-02-29 RX ADMIN — BUPROPION HYDROCHLORIDE 150 MG: 150 TABLET, EXTENDED RELEASE ORAL at 07:49

## 2024-02-29 RX ADMIN — VALSARTAN 320 MG: 320 TABLET, FILM COATED ORAL at 10:06

## 2024-02-29 NOTE — PLAN OF CARE
Goal Outcome Evaluation:  Plan of Care Reviewed With: patient        Progress: improving  Outcome Evaluation: Patient passed voiding trial. Pain well controlled. Suprapubic sites x2 with glue pj. Up ad juve. IVFs infusing. Spouse at bedside. Encouraged use of incentive spirometer.

## 2024-02-29 NOTE — SIGNIFICANT NOTE
02/29/24 0700   Urine Output   Voided Urine (mL) 300 mL   Additional Urine Volume Rows Bladder scan;Intermittent straight cath   Bladder Scan Volume (mL) 154 mL   Intermittent/Straight Cath (mL) 300 mL     Will try again

## 2024-02-29 NOTE — PROGRESS NOTES
Continued Stay Note  [facility]     Patient Name: Jose Keys  MRN: [id number]  Today's Date: 2/29/2024    Admit Date: 2/28/2024    Plan: Home no needs   Discharge Plan       Row Name 02/29/24 1255       Plan    Plan Home no needs    Plan Comments Introduced self and role of CCP. Patient confirmed DC plan is to return to home. Stated she is independent with ADL's and uses no DMEs. Stated her spouse will assist as needed and will provide transportation at DC. Denies any needs/equipment.                   Discharge Codes    No documentation.                 Expected Discharge Date and Time       Expected Discharge Date Expected Discharge Time    Feb 29, 2024               Annmarie Bonilla RN

## 2024-02-29 NOTE — SIGNIFICANT NOTE
02/29/24 0840   Urine Output   Voided Urine (mL) 200 mL   Bladder Scan Volume (mL) 11 mL     Patient passed voiding trial

## 2024-02-29 NOTE — NURSING NOTE
Discharge instructions provided. She has all Rx at home and a follow up appt scheduled. No questions/concerns at this time.

## 2024-02-29 NOTE — PLAN OF CARE
Goal Outcome Evaluation:  Plan of Care Reviewed With: patient        Progress: improving  Outcome Evaluation: S/P puncture sites intact with surgical glue. She did have some oozing noted at start of shift. Really no vaginal draiange. Packing intact and will be removed this am with brothers cath. She c/o pain and did not have additional options after taking the ultram. Call placed to Dr Kirby and order received for percocet(this appears to control the pain). She was also medicatd with scheduled ibuprofen. She c/o some nausea and medicated with zofran. No emesis. F/C with good output of orange colored urine. Pt has been afebrile and VS stable. IS to 2000.

## 2024-03-01 ENCOUNTER — TRANSITIONAL CARE MANAGEMENT TELEPHONE ENCOUNTER (OUTPATIENT)
Dept: CALL CENTER | Facility: HOSPITAL | Age: 74
End: 2024-03-01
Payer: MEDICARE

## 2024-03-01 NOTE — OUTREACH NOTE
Prep Survey      Flowsheet Row Responses   [facility] facility patient discharged from? [place]   Is LACE score < 7 ? Yes   Eligibility [facility]   Date of Admission 02/28/24   Date of Discharge 02/29/24   Discharge Disposition Home or Self Care   Discharge diagnosis Enterocele repair, vaginal approach   Does the patient have one of the following disease processes/diagnoses(primary or secondary)? General Surgery   Does the patient have Home health ordered? No   Is there a DME ordered? No   Prep survey completed? Yes            LIBRA COATES - Registered Nurse

## 2024-03-01 NOTE — PROGRESS NOTES
Case Management Discharge Note      Final Note: Discharged home. Annmarie Bonilla RN         Selected Continued Care - Discharged on 2/29/2024 Admission date: 2/28/2024 - Discharge disposition: Home or Self Care   Transportation Services  Private: Car    Final Discharge Disposition Code: 01 - home or self-care

## 2024-03-01 NOTE — OUTREACH NOTE
Call Center TCM Note      Flowsheet Row Responses   [facility] patient discharged from? [place]   Does the patient have one of the following disease processes/diagnoses(primary or secondary)? General Surgery   TCM attempt successful? Yes  [No VR for PCP]   Call start time 1434   Call end time 1439   Discharge diagnosis Enterocele repair, vaginal approach   Medication alerts for this patient Pain at tolerable level,  rates pain at worst since DC, 5/10   Meds reviewed with patient/caregiver? Yes   Is the patient having any side effects they believe may be caused by any medication additions or changes? No   Comments Pt eligible for TCM appt   Does the patient have an appointment with their PCP within 7-14 days of discharge? No   Nursing Interventions Patient desires to follow up with specialty only   Comments Pt reports tolerating po intake well, denies N/V and has no issues urinating. Pt has not had BM at home but has passed stool prior to DC, she reports. Pt is resting comfortably at home, she reports. Nurse educated on the importance of ambulating and using IS frequently during day, pt v/u.   Did the patient receive a copy of their discharge instructions? Yes   Nursing interventions Reviewed instructions with patient   What is the patient's perception of their health status since discharge? Improving   Nursing interventions Nurse provided patient education   Is the patient /caregiver able to teach back basic post-op care? Continue use of incentive spirometry at least 1 week post discharge, Practice 'cough and deep breath'   Is the patient/caregiver able to teach back signs and symptoms of incisional infection? Increased redness, swelling or pain at the incisonal site, Increased drainage or bleeding, Fever   Is the patient/caregiver able to teach back steps to recovery at home? Eat a well-balance diet, Rest and rebuild strength, gradually increase activity   If the patient is a current smoker, are they able  to teach back resources for cessation? Not a smoker   Is the patient/caregiver able to teach back the hierarchy of who to call/visit for symptoms/problems? PCP, Specialist, Home health nurse, Urgent Care, ED, 911 Yes   TCM call completed? Yes   Call end time 0551            Nya Roach RN    3/1/2024, 14:39 EST

## 2024-06-13 DIAGNOSIS — E11.40 CONTROLLED TYPE 2 DIABETES MELLITUS WITH DIABETIC NEUROPATHY, WITHOUT LONG-TERM CURRENT USE OF INSULIN: ICD-10-CM

## 2024-06-14 RX ORDER — METFORMIN HYDROCHLORIDE 500 MG/1
500 TABLET, EXTENDED RELEASE ORAL DAILY
Qty: 90 TABLET | Refills: 1 | OUTPATIENT
Start: 2024-06-14

## 2024-06-14 NOTE — TELEPHONE ENCOUNTER
Rx Refill Note  Requested Prescriptions     Pending Prescriptions Disp Refills    metFORMIN ER (GLUCOPHAGE-XR) 500 MG 24 hr tablet [Pharmacy Med Name: METFORMIN ER 500MG 24HR TABS] 90 tablet 1     Sig: TAKE 1 TABLET BY MOUTH DAILY      Last office visit with prescribing clinician: 1/30/2024   Last telemedicine visit with prescribing clinician: Visit date not found   Next office visit with prescribing clinician: Visit date not found                         Would you like a call back once the refill request has been completed: [] Yes [] No    If the office needs to give you a call back, can they leave a voicemail: [] Yes [] No    Yanely Erickson MA  06/14/24, 07:55 EDT

## 2024-06-21 ENCOUNTER — PATIENT MESSAGE (OUTPATIENT)
Dept: ENDOCRINOLOGY | Age: 74
End: 2024-06-21
Payer: MEDICARE

## 2024-06-21 RX ORDER — BLOOD SUGAR DIAGNOSTIC
STRIP MISCELLANEOUS
Qty: 200 EACH | Refills: 3 | Status: SHIPPED | OUTPATIENT
Start: 2024-06-21

## 2024-07-11 DIAGNOSIS — E11.40 CONTROLLED TYPE 2 DIABETES MELLITUS WITH DIABETIC NEUROPATHY, WITHOUT LONG-TERM CURRENT USE OF INSULIN: Primary | ICD-10-CM

## 2024-07-11 DIAGNOSIS — E11.40 CONTROLLED TYPE 2 DIABETES MELLITUS WITH DIABETIC NEUROPATHY, WITHOUT LONG-TERM CURRENT USE OF INSULIN: ICD-10-CM

## 2024-07-11 RX ORDER — METFORMIN HYDROCHLORIDE 500 MG/1
500 TABLET, EXTENDED RELEASE ORAL DAILY
Qty: 90 TABLET | Refills: 1 | OUTPATIENT
Start: 2024-07-11

## 2024-07-11 NOTE — TELEPHONE ENCOUNTER
Caller: Jose Keys    Relationship: Self    Best call back number: [phone]    Requested Prescriptions:     empagliflozin 25 MG tablet 25 mg, metFORMIN  MG tablet sustained-release 24 hour 1,000 mg     Requested Prescriptions      No prescriptions requested or ordered in this encounter        Pharmacy where request should be sent:  [facility]- [address] [place] [address]    Last office visit with prescribing clinician: 1/30/2024   Last telemedicine visit with prescribing clinician: Visit date not found   Next office visit with prescribing clinician: Visit date not found     Additional details provided by patient: PT NEEDS A REFILL ON THIS MEDICATION.     Does the patient have less than a 3 day supply:  [x] Yes  [] No    Would you like a call back once the refill request has been completed: [x] Yes [] No    If the office needs to give you a call back, can they leave a voicemail: [x] Yes [] No    Bernie Sam Rep   07/11/24 15:42 EDT

## 2024-07-11 NOTE — TELEPHONE ENCOUNTER
Rx Refill Note  Requested Prescriptions     Pending Prescriptions Disp Refills    empagliflozin 25 MG tablet 25 mg, metFORMIN  MG tablet sustained-release 24 hour 1,000 mg 90 tablet 2     Sig: Take 1 dose by mouth Daily. Indications: Type 2 Diabetes      Last office visit with prescribing clinician: 1/30/2024   Last telemedicine visit with prescribing clinician: Visit date not found   Next office visit with prescribing clinician: 7/15/24                        Would you like a call back once the refill request has been completed: [] Yes [] No    If the office needs to give you a call back, can they leave a voicemail: [] Yes [] No    Mindy Razo MA  07/11/24, 15:47 EDT

## 2024-07-11 NOTE — TELEPHONE ENCOUNTER
Rx Refill Note  Requested Prescriptions     Pending Prescriptions Disp Refills    metFORMIN ER (GLUCOPHAGE-XR) 500 MG 24 hr tablet [Pharmacy Med Name: METFORMIN ER 500MG 24HR TABS] 90 tablet 1     Sig: TAKE 1 TABLET BY MOUTH DAILY      Last office visit with prescribing clinician: 1/30/2024   Last telemedicine visit with prescribing clinician: Visit date not found   Next office visit with prescribing clinician: Visit date not found                         Would you like a call back once the refill request has been completed: [] Yes [] No    If the office needs to give you a call back, can they leave a voicemail: [] Yes [] No    Swapna Erickson  07/11/24, 10:41 EDT

## 2024-07-24 ENCOUNTER — TELEPHONE (OUTPATIENT)
Dept: FAMILY MEDICINE CLINIC | Facility: CLINIC | Age: 74
End: 2024-07-24
Payer: MEDICARE

## 2024-07-24 NOTE — TELEPHONE ENCOUNTER
Caller: Jose Keys    Relationship to patient: Self    Best call back number: [phone]    Patient is needing: SHE WOULD LIKE TO GET HER DEXA SCAN SET UP.  HOW DOES SHE GO ABOUT DOING THIS?

## 2024-07-25 ENCOUNTER — OFFICE VISIT (OUTPATIENT)
Dept: FAMILY MEDICINE CLINIC | Facility: CLINIC | Age: 74
End: 2024-07-25
Payer: MEDICARE

## 2024-07-25 VITALS
TEMPERATURE: 98.4 F | HEART RATE: 74 BPM | HEIGHT: 65 IN | DIASTOLIC BLOOD PRESSURE: 72 MMHG | RESPIRATION RATE: 16 BRPM | WEIGHT: 197 LBS | OXYGEN SATURATION: 98 % | BODY MASS INDEX: 32.82 KG/M2 | SYSTOLIC BLOOD PRESSURE: 124 MMHG

## 2024-07-25 DIAGNOSIS — J01.00 ACUTE NON-RECURRENT MAXILLARY SINUSITIS: Primary | ICD-10-CM

## 2024-07-25 DIAGNOSIS — E78.2 MIXED HYPERLIPIDEMIA: ICD-10-CM

## 2024-07-25 DIAGNOSIS — I10 ESSENTIAL HYPERTENSION: Chronic | ICD-10-CM

## 2024-07-25 DIAGNOSIS — E11.42 DIABETIC PERIPHERAL NEUROPATHY: Chronic | ICD-10-CM

## 2024-07-25 DIAGNOSIS — M10.9 GOUT, UNSPECIFIED CAUSE, UNSPECIFIED CHRONICITY, UNSPECIFIED SITE: Chronic | ICD-10-CM

## 2024-07-25 PROCEDURE — 99214 OFFICE O/P EST MOD 30 MIN: CPT | Performed by: FAMILY MEDICINE

## 2024-07-25 PROCEDURE — 1160F RVW MEDS BY RX/DR IN RCRD: CPT | Performed by: FAMILY MEDICINE

## 2024-07-25 PROCEDURE — 3074F SYST BP LT 130 MM HG: CPT | Performed by: FAMILY MEDICINE

## 2024-07-25 PROCEDURE — 3078F DIAST BP <80 MM HG: CPT | Performed by: FAMILY MEDICINE

## 2024-07-25 PROCEDURE — 1159F MED LIST DOCD IN RCRD: CPT | Performed by: FAMILY MEDICINE

## 2024-07-25 PROCEDURE — 3044F HG A1C LEVEL LT 7.0%: CPT | Performed by: FAMILY MEDICINE

## 2024-07-25 PROCEDURE — 1126F AMNT PAIN NOTED NONE PRSNT: CPT | Performed by: FAMILY MEDICINE

## 2024-07-25 RX ORDER — ROSUVASTATIN CALCIUM 5 MG/1
5 TABLET, COATED ORAL DAILY
Qty: 90 TABLET | Refills: 3 | Status: SHIPPED | OUTPATIENT
Start: 2024-07-25

## 2024-07-25 RX ORDER — AMOXICILLIN AND CLAVULANATE POTASSIUM 875; 125 MG/1; MG/1
1 TABLET, FILM COATED ORAL EVERY 12 HOURS SCHEDULED
Qty: 20 TABLET | Refills: 0 | Status: SHIPPED | OUTPATIENT
Start: 2024-07-25

## 2024-07-25 RX ORDER — HYDROXYZINE HYDROCHLORIDE 25 MG/1
25 TABLET, FILM COATED ORAL
COMMUNITY
Start: 2024-04-08

## 2024-07-25 RX ORDER — GABAPENTIN 300 MG/1
300 CAPSULE ORAL 2 TIMES DAILY
Qty: 180 CAPSULE | Refills: 1 | Status: SHIPPED | OUTPATIENT
Start: 2024-07-25

## 2024-07-25 RX ORDER — AMLODIPINE AND VALSARTAN 10; 320 MG/1; MG/1
1 TABLET ORAL DAILY
Qty: 90 TABLET | Refills: 3 | Status: SHIPPED | OUTPATIENT
Start: 2024-07-25

## 2024-07-25 RX ORDER — ALLOPURINOL 100 MG/1
100 TABLET ORAL DAILY
Qty: 90 TABLET | Refills: 3 | Status: SHIPPED | OUTPATIENT
Start: 2024-07-25

## 2024-07-25 NOTE — PROGRESS NOTES
Chief Complaint:   Chief Complaint   Patient presents with    Nasal Congestion    Sinusitis     X 1 day       Cough    Headache    Arthritis     Med refill due        Jose Keys 74 y.o. female who presents today for Medical Management of the below listed issues. She  has a problem list of   Patient Active Problem List   Diagnosis    Hyperuricemia    Diabetic peripheral neuropathy    Fatigue    Hyperlipidemia    Essential hypertension    Primary hypothyroidism    Menopause present    Proteinuria    Vitamin D deficiency    Anxiety    Type 2 diabetes mellitus    LFT elevation    Neuropathy    Type 2 diabetes mellitus with peripheral neuropathy    Vaginal atrophy    Gout    Statin intolerance    Vaginal vault prolapse    Vaginal enterocele    Rectocele    Loss of perineal body, female    Female stress incontinence    Outlet dysfunction constipation   .  Since the last visit, She has overall felt well until yesterday when she developed increased cough (yellow sputum), congestion, F/C, no wheezing/dyspnea, some sinus p/p.  she has been compliant with   Current Outpatient Medications:     allopurinol (Zyloprim) 100 MG tablet, Take 1 tablet by mouth Daily., Disp: 90 tablet, Rfl: 3    amLODIPine-valsartan (EXFORGE)  MG per tablet, Take 1 tablet by mouth Daily., Disp: 90 tablet, Rfl: 3    gabapentin (NEURONTIN) 300 MG capsule, Take 1 capsule by mouth 2 (Two) Times a Day., Disp: 180 capsule, Rfl: 1    rosuvastatin (Crestor) 5 MG tablet, Take 1 tablet by mouth Daily., Disp: 90 tablet, Rfl: 3    ACCU-CHEK FASTCLIX LANCETS [facility] [place], Use to test BG 2x daily. DX CODE: E11.65, Disp: 200 each, Rfl: 1    amoxicillin-clavulanate (AUGMENTIN) 875-125 MG per tablet, Take 1 tablet by mouth Every 12 (Twelve) Hours., Disp: 20 tablet, Rfl: 0    buPROPion XL (WELLBUTRIN XL) 300 MG 24 hr tablet, Take 1 tablet by mouth Daily., Disp: , Rfl: 2    carvedilol (COREG) 3.125 MG tablet, Take 1 tablet by mouth 2 (Two) Times a Day With Meals.  "(Patient taking differently: Take 1 tablet by mouth Every 12 (Twelve) Hours.), Disp: 180 tablet, Rfl: 1    cetirizine (zyrTEC) 10 MG tablet, Take 1 tablet by mouth Daily., Disp: , Rfl:     co-enzyme Q-10 30 MG capsule, Take 1 tablet by mouth Daily. HOLD FOR SURGERY, Disp: , Rfl:     empagliflozin 25 MG tablet 25 mg, metFORMIN  MG tablet sustained-release 24 hour 1,000 mg, Take 1 dose by mouth Daily., Disp: , Rfl:     escitalopram (LEXAPRO) 10 MG tablet, Take 2 tablets by mouth Daily., Disp: , Rfl: 2    estradiol (ESTRACE) 0.1 MG/GM vaginal cream, Insert  into the vagina See Admin Instructions. USE SM DAB DAILY VAG, Disp: , Rfl:     glucose blood (Accu-Chek SmartView) test strip, Use to test BG 2x daily, Disp: 200 each, Rfl: 3    hydrOXYzine (ATARAX) 25 MG tablet, Take 1 tablet by mouth every night at bedtime. (Patient not taking: Reported on 7/25/2024), Disp: , Rfl:     Lancet Devices (ACCU-CHEK SOFTCLIX) lancets, Use as instructed, Disp: 1 each, Rfl: 0    montelukast (SINGULAIR) 10 MG tablet, Take 1 tablet by mouth Every Night., Disp: , Rfl: 0    Multiple Vitamins-Minerals (MULTIVITAMIN PO), Take 1 tablet by mouth Daily., Disp: , Rfl:     Omega-3 Fatty Acids (fish oil) 1000 MG capsule capsule, Take 2 capsules by mouth 2 (Two) Times a Day. WILL HOLD FOR SURGEYR, Disp: , Rfl:     Synthroid 125 MCG tablet, TAKE 1 TABLET BY MOUTH DAILY, Disp: 90 tablet, Rfl: 1.  She denies medication side effects.    All of the other chronic condition(s) listed above are stable w/o issues.    /72   Pulse 74   Temp 98.4 °F (36.9 °C) (Oral)   Resp 16   Ht 164.5 cm (64.75\")   Wt 89.4 kg (197 lb)   SpO2 98%   BMI 33.04 kg/m²     Results for orders placed or performed during the hospital encounter of 02/28/24   Hemoglobin & Hematocrit, Blood    Specimen: Blood   Result Value Ref Range    Hemoglobin 10.9 (L) 12.0 - 15.9 g/dL    Hematocrit 32.4 (L) 34.0 - 46.6 %   POC Glucose Once    Specimen: Blood   Result Value Ref Range "    Glucose 95 70 - 130 mg/dL   Type & Screen    Specimen: Blood   Result Value Ref Range    ABO Type O     RH type Positive     Antibody Screen Negative     T&S Expiration Date 3/2/2024 11:59:59 PM              The following portions of the patient's history were reviewed and updated as appropriate: allergies, current medications, past family history, past medical history, past social history, past surgical history, and problem list.    Review of Systems   Constitutional:  Positive for chills and fever. Negative for activity change.   HENT:  Positive for congestion and sinus pressure.    Respiratory:  Positive for cough.    Cardiovascular:  Negative for chest pain.   Psychiatric/Behavioral:  Negative for dysphoric mood.        Objective             Physical Exam  Vitals and nursing note reviewed.   Constitutional:       General: She is not in acute distress.     Appearance: She is well-developed.   HENT:      Nose:      Right Sinus: Maxillary sinus tenderness present.      Left Sinus: Maxillary sinus tenderness present.   Cardiovascular:      Rate and Rhythm: Normal rate and regular rhythm.   Pulmonary:      Effort: Pulmonary effort is normal.      Breath sounds: Normal breath sounds.   Neurological:      Mental Status: She is alert and oriented to person, place, and time.   Psychiatric:         Behavior: Behavior normal.         Thought Content: Thought content normal.             Diagnoses and all orders for this visit:    1. Acute non-recurrent maxillary sinusitis (Primary)  -     amoxicillin-clavulanate (AUGMENTIN) 875-125 MG per tablet; Take 1 tablet by mouth Every 12 (Twelve) Hours.  Dispense: 20 tablet; Refill: 0    2. Diabetic peripheral neuropathy  -     gabapentin (NEURONTIN) 300 MG capsule; Take 1 capsule by mouth 2 (Two) Times a Day.  Dispense: 180 capsule; Refill: 1    3. Gout, unspecified cause, unspecified chronicity, unspecified site  -     allopurinol (Zyloprim) 100 MG tablet; Take 1 tablet by mouth  Daily.  Dispense: 90 tablet; Refill: 3    4. Essential hypertension  -     amLODIPine-valsartan (EXFORGE)  MG per tablet; Take 1 tablet by mouth Daily.  Dispense: 90 tablet; Refill: 3    5. Mixed hyperlipidemia  -     rosuvastatin (Crestor) 5 MG tablet; Take 1 tablet by mouth Daily.  Dispense: 90 tablet; Refill: 3

## 2024-08-14 ENCOUNTER — OFFICE VISIT (OUTPATIENT)
Dept: ENDOCRINOLOGY | Age: 74
End: 2024-08-14
Payer: MEDICARE

## 2024-08-14 VITALS
BODY MASS INDEX: 32.69 KG/M2 | OXYGEN SATURATION: 98 % | TEMPERATURE: 97.1 F | HEIGHT: 65 IN | SYSTOLIC BLOOD PRESSURE: 124 MMHG | HEART RATE: 66 BPM | DIASTOLIC BLOOD PRESSURE: 74 MMHG | WEIGHT: 196.2 LBS

## 2024-08-14 DIAGNOSIS — E78.5 HYPERLIPIDEMIA, UNSPECIFIED HYPERLIPIDEMIA TYPE: ICD-10-CM

## 2024-08-14 DIAGNOSIS — E11.42 TYPE 2 DIABETES MELLITUS WITH PERIPHERAL NEUROPATHY: Primary | ICD-10-CM

## 2024-08-14 DIAGNOSIS — E06.3 HYPOTHYROIDISM DUE TO HASHIMOTO'S THYROIDITIS: ICD-10-CM

## 2024-08-14 DIAGNOSIS — E03.8 HYPOTHYROIDISM DUE TO HASHIMOTO'S THYROIDITIS: ICD-10-CM

## 2024-08-14 DIAGNOSIS — E03.9 PRIMARY HYPOTHYROIDISM: ICD-10-CM

## 2024-08-14 DIAGNOSIS — I10 ESSENTIAL HYPERTENSION: ICD-10-CM

## 2024-08-14 LAB
ALBUMIN SERPL-MCNC: 4.3 G/DL (ref 3.5–5.2)
ALBUMIN/GLOB SERPL: 1.7 G/DL
ALP SERPL-CCNC: 66 U/L (ref 39–117)
ALT SERPL-CCNC: 18 U/L (ref 1–33)
AST SERPL-CCNC: 19 U/L (ref 1–32)
BILIRUB SERPL-MCNC: 0.3 MG/DL (ref 0–1.2)
BUN SERPL-MCNC: 13 MG/DL (ref 8–23)
BUN/CREAT SERPL: 18.3 (ref 7–25)
CALCIUM SERPL-MCNC: 9.1 MG/DL (ref 8.6–10.5)
CHLORIDE SERPL-SCNC: 105 MMOL/L (ref 98–107)
CHOLEST SERPL-MCNC: 178 MG/DL (ref 0–200)
CO2 SERPL-SCNC: 26.6 MMOL/L (ref 22–29)
CREAT SERPL-MCNC: 0.71 MG/DL (ref 0.57–1)
EGFRCR SERPLBLD CKD-EPI 2021: 89.9 ML/MIN/1.73
GLOBULIN SER CALC-MCNC: 2.6 GM/DL
GLUCOSE SERPL-MCNC: 171 MG/DL (ref 65–99)
HBA1C MFR BLD: 6.1 % (ref 4.8–5.6)
HDLC SERPL-MCNC: 50 MG/DL (ref 40–60)
IMP & REVIEW OF LAB RESULTS: NORMAL
LDLC SERPL CALC-MCNC: 100 MG/DL (ref 0–100)
POTASSIUM SERPL-SCNC: 4.1 MMOL/L (ref 3.5–5.2)
PROT SERPL-MCNC: 6.9 G/DL (ref 6–8.5)
SODIUM SERPL-SCNC: 142 MMOL/L (ref 136–145)
TRIGL SERPL-MCNC: 159 MG/DL (ref 0–150)
TSH SERPL DL<=0.005 MIU/L-ACNC: 2.14 UIU/ML (ref 0.27–4.2)
VLDLC SERPL CALC-MCNC: 28 MG/DL (ref 5–40)

## 2024-08-14 NOTE — PROGRESS NOTES
Chief Complaint  Chief Complaint   Patient presents with    Diabetes     Type 2 : Pt doesn't have meter today, checks bs a couple times a week, is up to date on eye exam, no hx of retinopathy, mild neuropathy.         Subjective          History of Present Illness    Jose Keys 74 y.o. presents for a follow-up for Type 2 Diabetes    She was diagnosed with diabetes more than 20 years ago    Pt is on the following medications for their diabetes: not sure what she is taking    Metformin/empagliflozin combo or Metformin  mg 1 tablets daily     Higher doses of metformin gives pt diarrhea.         Pt complains of numbness and tingling in feet    Denies diarrhea, constipation, chest pain, shortness of breath and vision changes    Pt denies a history of diabetic retinopathy.  Last eye exam was 08/23    Pt does not have nephropathy.  Patient is currently taking ARB    Pt does have neuropathy.  Current takes gabapentin for this condition per PCP    Pt denies a history of CAD or CVA.    Last A1C in 01/24 was 6.2    Last microalbumin in 01/24 was negative          Blood Sugars    Blood glucoses are checked couple times a week.    Fasting blood glucoses: around 124    Pt denies episodes of hypoglycemia.          Hypothyroid    Pt complains of dry skin and heat intolerance    Denies constipation, diarrhea, chest pain, palpitations or cold intolerance.    Current treatment is branded Synthroid 125 mcg daily     Last labs in 01/24 showed TSH 0.561          Hyperlipidemia     Pt is currently taking Crestor 5 mg HS (per cardiology)  Statins stopped due to muscle weakness  Zetia causes dizziness and palpitations  Praluent caused anaphylactic symptoms  Nexletol was denied by insurance    Last lipid panel in 07/23 showed Total 238, HDL 49,  and Triglycerides 226            Hypertension    Pt denies any chest pain, palpitations, shortness of breath or headache    Current regimen includes amlodipine-valsartan  mg  "daily and carvedilol 3.125 mg twice daily          I have reviewed the patient's allergies, medicines, past medical hx, family hx and social hx.    Objective   Vital Signs:   /74   Pulse 66   Temp 97.1 °F (36.2 °C) (Temporal)   Ht 164.5 cm (64.76\")   Wt 89 kg (196 lb 3.2 oz)   SpO2 98%   BMI 32.89 kg/m²       Physical Exam   Physical Exam  Constitutional:       General: She is not in acute distress.     Appearance: Normal appearance. She is not diaphoretic.   HENT:      Head: Normocephalic and atraumatic.   Eyes:      General:         Right eye: No discharge.         Left eye: No discharge.   Skin:     General: Skin is warm and dry.   Neurological:      Mental Status: She is alert.   Psychiatric:         Mood and Affect: Mood normal.         Behavior: Behavior normal.                    Results Review:   Hemoglobin A1C   Date Value Ref Range Status   01/30/2024 6.20 (H) 4.80 - 5.60 % Final     Comment:     Hemoglobin A1C Ranges:  Increased Risk for Diabetes  5.7% to 6.4%  Diabetes                     >= 6.5%  Diabetic Goal                < 7.0%       Triglycerides   Date Value Ref Range Status   07/18/2023 226 (H) 0 - 150 mg/dL Final     HDL Cholesterol   Date Value Ref Range Status   07/18/2023 49 40 - 60 mg/dL Final     LDL Chol Calc (NIH)   Date Value Ref Range Status   07/18/2023 148 (H) 0 - 100 mg/dL Final     VLDL Cholesterol K[place]   Date Value Ref Range Status   07/18/2023 41 (H) 5 - 40 mg/dL Final         Assessment and Plan {CC Problem List  Visit Diagnosis  ROS  Review (Popup)  Health Maintenance  Quality  BestPractice  Medications  SmartSets  SnapShot Encounters  Media :23  Diagnoses and all orders for this visit:    1. Type 2 diabetes mellitus with peripheral neuropathy (Primary)  -     Hemoglobin A1c  -     Comprehensive Metabolic Panel    Metformin/empagliflozin is on pt's medication list, but she doesn't know if she is taking combo or just metformin as noted in Dr. Loera's last " note - pt is to check at home and let me know what she is taking  Check labs today      2. Primary hypothyroidism  -     TSH Rfx On Abnormal To Free T4    Check labs today  Continue with branded Synthroid 125 mcg daily      3. Hyperlipidemia, unspecified hyperlipidemia type  -     Comprehensive Metabolic Panel  -     Lipid Panel    Check labs today  Continue with statin per cardiology      4. Essential hypertension  -     Comprehensive Metabolic Panel    Stable  Continue with current medication regimen  Defer management to PCP          Labs today  RTC in 6 months with Dr. Loera      Follow Up     Patient was given instructions and counseling regarding her condition or for health maintenance advice. Please see specific information pulled into the AVS if appropriate.                Marylu Harry, APRN  08/14/24

## 2024-08-15 ENCOUNTER — TELEPHONE (OUTPATIENT)
Dept: ENDOCRINOLOGY | Age: 74
End: 2024-08-15
Payer: MEDICARE

## 2024-08-15 RX ORDER — METFORMIN HYDROCHLORIDE 500 MG/1
500 TABLET, EXTENDED RELEASE ORAL
Qty: 90 TABLET | Refills: 1 | Status: SHIPPED | OUTPATIENT
Start: 2024-08-15

## 2024-08-15 RX ORDER — LEVOTHYROXINE SODIUM 125 MCG
125 TABLET ORAL DAILY
Qty: 90 TABLET | Refills: 1 | Status: SHIPPED | OUTPATIENT
Start: 2024-08-15

## 2024-08-15 NOTE — TELEPHONE ENCOUNTER
Rx Refill Note  Requested Prescriptions     Pending Prescriptions Disp Refills    Synthroid 125 MCG tablet [Pharmacy Med Name: SYNTHROID 0.125MG (125MCG) TABLETS] 90 tablet 1     Sig: TAKE 1 TABLET BY MOUTH DAILY      Last office visit with prescribing clinician: Visit date not found   Last telemedicine visit with prescribing clinician: Visit date not found   Next office visit with prescribing clinician: Visit date not found                         Would you like a call back once the refill request has been completed: [] Yes [] No    If the office needs to give you a call back, can they leave a voicemail: [] Yes [] No    Yanely Erickson MA  08/15/24, 07:43 EDT

## 2024-08-15 NOTE — TELEPHONE ENCOUNTER
Left pt a message to return call.    OKAY FOR HUB AND  TO READ RESULTS.      A1c is great at 6.1%    Kidney function is good    Thyroid labs are good.  Continue with current dose of Synthroid    Total cholesterol and LDL are normal, continue with statin.  Triglycerides are slightly elevated

## 2024-11-18 ENCOUNTER — HOSPITAL ENCOUNTER (OUTPATIENT)
Dept: BONE DENSITY | Facility: HOSPITAL | Age: 74
Discharge: HOME | End: 2024-11-18
Admitting: FAMILY MEDICINE
Payer: MEDICARE

## 2024-11-18 DIAGNOSIS — Z78.0 POSTMENOPAUSAL: ICD-10-CM

## 2024-11-18 PROCEDURE — 77080 DXA BONE DENSITY AXIAL: CPT

## 2025-01-24 DIAGNOSIS — Z12.31 ENCOUNTER FOR SCREENING MAMMOGRAM FOR BREAST CANCER: Primary | ICD-10-CM

## 2025-03-08 DIAGNOSIS — E11.42 DIABETIC PERIPHERAL NEUROPATHY: Chronic | ICD-10-CM

## 2025-03-08 DIAGNOSIS — E06.3 HYPOTHYROIDISM DUE TO HASHIMOTO'S THYROIDITIS: ICD-10-CM

## 2025-03-09 RX ORDER — GABAPENTIN 300 MG/1
300 CAPSULE ORAL 2 TIMES DAILY
Qty: 60 CAPSULE | Refills: 0 | Status: SHIPPED | OUTPATIENT
Start: 2025-03-09

## 2025-03-10 RX ORDER — METFORMIN HYDROCHLORIDE 500 MG/1
500 TABLET, EXTENDED RELEASE ORAL
Qty: 90 TABLET | Refills: 1 | Status: SHIPPED | OUTPATIENT
Start: 2025-03-10

## 2025-03-10 RX ORDER — LEVOTHYROXINE SODIUM 125 MCG
125 TABLET ORAL DAILY
Qty: 90 TABLET | Refills: 1 | Status: SHIPPED | OUTPATIENT
Start: 2025-03-10

## 2025-03-10 NOTE — TELEPHONE ENCOUNTER
Rx Refill Note  Requested Prescriptions     Pending Prescriptions Disp Refills    metFORMIN ER (GLUCOPHAGE-XR) 500 MG 24 hr tablet [Pharmacy Med Name: METFORMIN ER 500MG 24HR TABS] 90 tablet 1     Sig: TAKE 1 TABLET BY MOUTH DAILY WITH BREAKFAST    Synthroid 125 MCG tablet [Pharmacy Med Name: SYNTHROID 0.125MG (125MCG) TABLETS] 90 tablet 1     Sig: TAKE 1 TABLET BY MOUTH DAILY      Last office visit with prescribing clinician: 8/14/2024   Last telemedicine visit with prescribing clinician: Visit date not found   Next office visit with prescribing clinician: Visit date not found                         Would you like a call back once the refill request has been completed: [] Yes [] No    If the office needs to give you a call back, can they leave a voicemail: [] Yes [] No    Swapna Erickson  03/10/25, 08:00 EDT

## 2025-05-13 DIAGNOSIS — E11.42 DIABETIC PERIPHERAL NEUROPATHY: Chronic | ICD-10-CM

## 2025-05-13 RX ORDER — GABAPENTIN 300 MG/1
300 CAPSULE ORAL 2 TIMES DAILY
Qty: 60 CAPSULE | OUTPATIENT
Start: 2025-05-13

## 2025-06-03 RX ORDER — ALLOPURINOL 100 MG/1
100 TABLET ORAL DAILY
Qty: 90 TABLET | Refills: 3 | Status: CANCELLED | OUTPATIENT
Start: 2025-06-03

## 2025-06-03 NOTE — ASSESSMENT & PLAN NOTE
Hypertension is stable and controlled  Continue current treatment regimen.  Blood pressure will be reassessed in 1 year.    Orders:    amLODIPine-valsartan (EXFORGE)  MG per tablet; Take 1 tablet by mouth Daily.

## 2025-06-03 NOTE — ASSESSMENT & PLAN NOTE
Diabetes is stable.   Continue current treatment regimen.  Diabetes will be reassessed in 6 months    Orders:    gabapentin (NEURONTIN) 300 MG capsule; Take 1 capsule by mouth 2 (Two) Times a Day.

## 2025-06-03 NOTE — PROGRESS NOTES
Subjective   The ABCs of the Annual Wellness Visit  Medicare Wellness Visit      Jose Keys is a 75 y.o. patient who presents for a Medicare Wellness Visit.    The following portions of the patient's history were reviewed and   updated as appropriate: allergies, current medications, past family history, past medical history, past social history, past surgical history, and problem list.    Compared to one year ago, the patient's physical   health is the same.  Compared to one year ago, the patient's mental   health is the same.    Recent Hospitalizations:  She was not admitted to the hospital during the last year.     Current Medical Providers:  Patient Care Team:  James Lau MD as PCP - General  Adelita Nguyen MD as Consulting Physician (Endocrinology)  Kammerling, James M, MD as Consulting Physician (Cardiac Electrophysiology)  Marylu Harry APRN as Nurse Practitioner (Nurse Practitioner)  Fredy Loera MD as Consulting Physician (Endocrinology)  Aminta Buck APRN as Nurse Practitioner (Obstetrics and Gynecology)  Michelle Burnett, SVETLANA (Optometry)    Outpatient Medications Prior to Visit   Medication Sig Dispense Refill    [facility] Use to test BG 2x daily. DX CODE: E11.65 200 each 1    buPROPion XL (WELLBUTRIN XL) 300 MG 24 hr tablet Take 1 tablet by mouth Daily.  2    carvedilol (COREG) 3.125 MG tablet Take 1 tablet by mouth 2 (Two) Times a Day With Meals. (Patient taking differently: Take 1 tablet by mouth Every 12 (Twelve) Hours.) 180 tablet 1    co-enzyme Q-10 30 MG capsule Take 1 tablet by mouth Daily. HOLD FOR SURGERY      escitalopram (LEXAPRO) 10 MG tablet Take 2 tablets by mouth Daily.  2    estradiol (ESTRACE) 0.1 MG/GM vaginal cream Insert  into the vagina See Admin Instructions. USE SM DAB DAILY VAG      glucose blood (Accu-Chek SmartView) test strip Use to test BG 2x daily 200 each 3    Lancet Devices (ACCU-CHEK SOFTCLIX) lancets Use as instructed 1 each  0    metFORMIN ER (GLUCOPHAGE-XR) 500 MG 24 hr tablet TAKE 1 TABLET BY MOUTH DAILY WITH BREAKFAST 90 tablet 1    Multiple Vitamins-Minerals (MULTIVITAMIN PO) Take 1 tablet by mouth Daily.      Omega-3 Fatty Acids (fish oil) 1000 MG capsule capsule Take 2 capsules by mouth 2 (Two) Times a Day. WILL HOLD FOR SURGEYR      Synthroid 125 MCG tablet TAKE 1 TABLET BY MOUTH DAILY 90 tablet 1    allopurinol (Zyloprim) 100 MG tablet Take 1 tablet by mouth Daily. 90 tablet 3    amLODIPine-valsartan (EXFORGE)  MG per tablet Take 1 tablet by mouth Daily. 90 tablet 3    gabapentin (NEURONTIN) 300 MG capsule TAKE 1 CAPSULE BY MOUTH TWICE DAILY 60 capsule 0    rosuvastatin (Crestor) 5 MG tablet Take 1 tablet by mouth Daily. 90 tablet 3     No facility-administered medications prior to visit.     No opioid medication identified on active medication list. I have reviewed chart for other potential  high risk medication/s and harmful drug interactions in the elderly.      Aspirin is not on active medication list.  Aspirin use is not indicated based on review of current medical condition/s. Risk of harm outweighs potential benefits.  .    Patient Active Problem List   Diagnosis    Hyperuricemia    Diabetic peripheral neuropathy    Fatigue    Hyperlipidemia    Essential hypertension    Primary hypothyroidism    Menopause present    Proteinuria    Vitamin D deficiency    Anxiety    Type 2 diabetes mellitus    LFT elevation    Neuropathy    Type 2 diabetes mellitus with peripheral neuropathy    Vaginal atrophy    Gout    Statin intolerance    Vaginal vault prolapse    Vaginal enterocele    Rectocele    Loss of perineal body, female    Female stress incontinence    Outlet dysfunction constipation    Dupuytren contracture of left hand     Advance Care Planning Advance Directive is not on file.  ACP discussion was held with the patient during this visit. Patient does not have an advance directive, declines further assistance.   "          Objective   Vitals:    25 0958   BP: 122/72   Pulse: 70   Resp: 16   Temp: 97.5 °F (36.4 °C)   TempSrc: Temporal   SpO2: 98%   Weight: 88.9 kg (196 lb)   Height: 165.1 cm (65\")   PainSc: 4    PainLoc: Comment: BACK PAIN       Estimated body mass index is 32.62 kg/m² as calculated from the following:    Height as of this encounter: 165.1 cm (65\").    Weight as of this encounter: 88.9 kg (196 lb).    BMI is >= 30 and <35. (Class 1 Obesity). The following options were offered after discussion;: exercise counseling/recommendations and nutrition counseling/recommendations           Does the patient have evidence of cognitive impairment? No                                                                                                Health  Risk Assessment    Smoking Status:  Social History     Tobacco Use   Smoking Status Never    Passive exposure: Never   Smokeless Tobacco Never     Alcohol Consumption:  Social History     Substance and Sexual Activity   Alcohol Use Not Currently    Comment: socially        Fall Risk Screen  STEADI Fall Risk Assessment has not been completed.    Depression Screening   The PHQ has not been completed during this encounter.     Health Habits and Functional and Cognitive Screenin/4/2025     9:00 AM   Functional & Cognitive Status   Do you have difficulty preparing food and eating? No   Do you have difficulty bathing yourself, getting dressed or grooming yourself? No   Do you have difficulty using the toilet? No   Do you have difficulty moving around from place to place? No   Do you have trouble with steps or getting out of a bed or a chair? No   Current Diet Well Balanced Diet   Dental Exam Up to date   Eye Exam Up to date   Exercise (times per week) 5 times per week   Current Exercises Include Walking   Do you need help using the phone?  No   Are you deaf or do you have serious difficulty hearing?  No   Do you need help to go to places out of walking distance? No "   Do you need help shopping? No   Do you need help preparing meals?  No   Do you need help with housework?  No   Do you need help with laundry? No   Do you need help taking your medications? No   Do you need help managing money? No   Do you ever drive or ride in a car without wearing a seat belt? No   Have you felt unusual stress, anger or loneliness in the last month? No   Who do you live with? Spouse   If you need help, do you have trouble finding someone available to you? No   Have you been bothered in the last four weeks by sexual problems? No   Do you have difficulty concentrating, remembering or making decisions? No           Age-appropriate Screening Schedule:  Refer to the list below for future screening recommendations based on patient's age, sex and/or medical conditions. Orders for these recommended tests are listed in the plan section. The patient has been provided with a written plan.    Health Maintenance List  Health Maintenance   Topic Date Due    Pneumococcal Vaccine 50+ (1 of 2 - PCV) Never done    TDAP/TD VACCINES (1 - Tdap) Never done    COVID-19 Vaccine (3 - 2024-25 season) 09/01/2024    DIABETIC FOOT EXAM  01/30/2025    URINE MICROALBUMIN-CREATININE RATIO (uACR)  01/30/2025    HEMOGLOBIN A1C  02/14/2025    INFLUENZA VACCINE  07/01/2025    LIPID PANEL  08/14/2025    DIABETIC EYE EXAM  08/28/2025    MAMMOGRAM  02/25/2026    ANNUAL WELLNESS VISIT  06/04/2026    DXA SCAN  11/18/2026    COLORECTAL CANCER SCREENING  08/02/2033    HEPATITIS C SCREENING  Discontinued    ZOSTER VACCINE  Discontinued                                                                                                                                                CMS Preventative Services Quick Reference  Risk Factors Identified During Encounter  None Identified    The above risks/problems have been discussed with the patient.  Pertinent information has been shared with the patient in the After Visit Summary.  An After Visit  "Summary and PPPS were made available to the patient.    Follow Up:   Next Medicare Wellness visit to be scheduled in 1 year.         Additional E&M Note during same encounter follows:  Patient has additional, significant, and separately identifiable condition(s)/problem(s) that require work above and beyond the Medicare Wellness Visit     Chief Complaint  medicare wellness, Hypertension (NO LABS / DR ELAM ), Hyperlipidemia, and Arthritis (Med refill due )    Subjective   HPI  Jose is also being seen today for additional medical problem/s.    Review of Systems   Constitutional:  Negative for chills and fever.   HENT:  Negative for congestion and sinus pressure.    Eyes:  Negative for visual disturbance.   Respiratory:  Negative for cough and shortness of breath.    Cardiovascular:  Negative for chest pain.   Gastrointestinal:  Negative for abdominal pain.   Genitourinary:  Negative for dysuria.   Skin:  Negative for rash.   Hematological:  Negative for adenopathy.   Psychiatric/Behavioral:  Negative for dysphoric mood.               Objective   Vital Signs:  /72   Pulse 70   Temp 97.5 °F (36.4 °C) (Temporal)   Resp 16   Ht 165.1 cm (65\")   Wt 88.9 kg (196 lb)   SpO2 98%   BMI 32.62 kg/m²   Physical Exam  Vitals and nursing note reviewed.   Constitutional:       General: She is not in acute distress.     Appearance: She is well-developed.   Cardiovascular:      Rate and Rhythm: Normal rate and regular rhythm.   Pulmonary:      Effort: Pulmonary effort is normal.      Breath sounds: Normal breath sounds.   Neurological:      Mental Status: She is alert and oriented to person, place, and time.   Psychiatric:         Behavior: Behavior normal.         Thought Content: Thought content normal.                    Assessment and Plan Additional age appropriate preventative wellness advice topics were discussed during today's preventative wellness exam(some topics already addressed during AWV portion of the note " above):    Physical Activity: Advised cardiovascular activity 150 minutes per week as tolerated. (example brisk walk for 30 minutes, 5 days a week).     Nutrition: Discussed nutrition plan with patient. Information shared in after visit summary. Goal is for a well balanced diet to enhance overall health.     Encounter for subsequent annual wellness visit (AWV) in Medicare patient         Gout, unspecified cause, unspecified chronicity, unspecified site         Essential hypertension  Hypertension is stable and controlled  Continue current treatment regimen.  Blood pressure will be reassessed in 1 year.    Orders:    amLODIPine-valsartan (EXFORGE)  MG per tablet; Take 1 tablet by mouth Daily.    Diabetic peripheral neuropathy  Diabetes is stable.   Continue current treatment regimen.  Diabetes will be reassessed in 6 months    Orders:    gabapentin (NEURONTIN) 300 MG capsule; Take 1 capsule by mouth 2 (Two) Times a Day.    Mixed hyperlipidemia   Lipid abnormalities are stable    Plan:  Continue same medication/s without change.      Counseled patient on lifestyle modifications to help control hyperlipidemia.     Patient Treatment Goals:   LDL goal is less than 70    Followup in 6 months.    Orders:    rosuvastatin (Crestor) 5 MG tablet; Take 1 tablet by mouth Daily.    Dupuytren contracture of left hand    Orders:    Ambulatory Referral to Hand Surgery          I spent 15 minutes caring for Jose on this date of service. This time includes time spent by me in the following activities:preparing for the visit, performing a medically appropriate examination and/or evaluation , ordering medications, tests, or procedures, and documenting information in the medical record  Follow Up   Return in about 6 months (around 12/4/2025) for Recheck.  Patient was given instructions and counseling regarding her condition or for health maintenance advice. Please see specific information pulled into the AVS if appropriate.

## 2025-06-03 NOTE — ASSESSMENT & PLAN NOTE
Lipid abnormalities are stable    Plan:  Continue same medication/s without change.      Counseled patient on lifestyle modifications to help control hyperlipidemia.     Patient Treatment Goals:   LDL goal is less than 70    Followup in 6 months.    Orders:    rosuvastatin (Crestor) 5 MG tablet; Take 1 tablet by mouth Daily.

## 2025-06-04 ENCOUNTER — OFFICE VISIT (OUTPATIENT)
Dept: FAMILY MEDICINE CLINIC | Facility: CLINIC | Age: 75
End: 2025-06-04
Payer: MEDICARE

## 2025-06-04 VITALS
TEMPERATURE: 97.5 F | OXYGEN SATURATION: 98 % | WEIGHT: 196 LBS | HEART RATE: 70 BPM | HEIGHT: 65 IN | DIASTOLIC BLOOD PRESSURE: 72 MMHG | SYSTOLIC BLOOD PRESSURE: 122 MMHG | RESPIRATION RATE: 16 BRPM | BODY MASS INDEX: 32.65 KG/M2

## 2025-06-04 DIAGNOSIS — M72.0 DUPUYTREN CONTRACTURE OF LEFT HAND: ICD-10-CM

## 2025-06-04 DIAGNOSIS — E78.2 MIXED HYPERLIPIDEMIA: ICD-10-CM

## 2025-06-04 DIAGNOSIS — I10 ESSENTIAL HYPERTENSION: Chronic | ICD-10-CM

## 2025-06-04 DIAGNOSIS — E11.42 DIABETIC PERIPHERAL NEUROPATHY: Chronic | ICD-10-CM

## 2025-06-04 DIAGNOSIS — Z00.00 ENCOUNTER FOR SUBSEQUENT ANNUAL WELLNESS VISIT (AWV) IN MEDICARE PATIENT: Primary | ICD-10-CM

## 2025-06-04 DIAGNOSIS — M10.9 GOUT, UNSPECIFIED CAUSE, UNSPECIFIED CHRONICITY, UNSPECIFIED SITE: Chronic | ICD-10-CM

## 2025-06-04 RX ORDER — GABAPENTIN 300 MG/1
300 CAPSULE ORAL 2 TIMES DAILY
Qty: 180 CAPSULE | Refills: 1 | Status: SHIPPED | OUTPATIENT
Start: 2025-06-04

## 2025-06-04 RX ORDER — ROSUVASTATIN CALCIUM 5 MG/1
5 TABLET, COATED ORAL DAILY
Qty: 90 TABLET | Refills: 3 | Status: SHIPPED | OUTPATIENT
Start: 2025-06-04

## 2025-06-04 RX ORDER — AMLODIPINE AND VALSARTAN 10; 320 MG/1; MG/1
1 TABLET ORAL DAILY
Qty: 90 TABLET | Refills: 3 | Status: SHIPPED | OUTPATIENT
Start: 2025-06-04

## 2025-06-04 NOTE — PATIENT INSTRUCTIONS
Medicare Wellness  Personal Prevention Plan of Service     Date of Office Visit:    Encounter Provider:  James Lau MD  Place of Service:  [facility]  Patient Name: Jose Keys  :  1950    As part of the Medicare Wellness portion of your visit today, we are providing you with this personalized preventive plan of services (PPPS). This plan is based upon recommendations of the [place] Preventive Services Task Force (USPSTF) and the Advisory Committee on Immunization Practices (ACIP).    This lists the preventive care services that should be considered, and provides dates of when you are due. Items listed as completed are up-to-date and do not require any further intervention.    Health Maintenance   Topic Date Due    Pneumococcal Vaccine 50+ (1 of 2 - PCV) Never done    TDAP/TD VACCINES (1 - Tdap) Never done    COVID-19 Vaccine (3 - - season) 2024    DIABETIC FOOT EXAM  2025    URINE MICROALBUMIN-CREATININE RATIO (uACR)  2025    HEMOGLOBIN A1C  2025    INFLUENZA VACCINE  2025    LIPID PANEL  2025    DIABETIC EYE EXAM  2025    MAMMOGRAM  2026    ANNUAL WELLNESS VISIT  2026    DXA SCAN  2026    COLORECTAL CANCER SCREENING  2033    HEPATITIS C SCREENING  Discontinued    ZOSTER VACCINE  Discontinued       No orders of the defined types were placed in this encounter.      Return in about 6 months (around 2025) for Recheck.

## 2025-07-23 ENCOUNTER — TELEPHONE (OUTPATIENT)
Dept: ENDOCRINOLOGY | Age: 75
End: 2025-07-23

## 2025-07-23 DIAGNOSIS — E78.5 HYPERLIPIDEMIA, UNSPECIFIED HYPERLIPIDEMIA TYPE: ICD-10-CM

## 2025-07-23 DIAGNOSIS — E11.42 TYPE 2 DIABETES MELLITUS WITH PERIPHERAL NEUROPATHY: Primary | ICD-10-CM

## 2025-07-23 DIAGNOSIS — E55.9 VITAMIN D DEFICIENCY: ICD-10-CM

## 2025-07-23 DIAGNOSIS — E03.9 PRIMARY HYPOTHYROIDISM: ICD-10-CM

## 2025-07-25 ENCOUNTER — LAB (OUTPATIENT)
Dept: ENDOCRINOLOGY | Age: 75
End: 2025-07-25
Payer: MEDICARE

## 2025-07-25 DIAGNOSIS — E03.9 PRIMARY HYPOTHYROIDISM: ICD-10-CM

## 2025-07-25 DIAGNOSIS — E55.9 VITAMIN D DEFICIENCY: ICD-10-CM

## 2025-07-25 DIAGNOSIS — E11.42 TYPE 2 DIABETES MELLITUS WITH PERIPHERAL NEUROPATHY: ICD-10-CM

## 2025-07-25 DIAGNOSIS — E78.5 HYPERLIPIDEMIA, UNSPECIFIED HYPERLIPIDEMIA TYPE: ICD-10-CM

## 2025-07-26 LAB
25(OH)D3+25(OH)D2 SERPL-MCNC: 52.3 NG/ML (ref 30–100)
ALBUMIN SERPL-MCNC: 4.5 G/DL (ref 3.5–5.2)
ALBUMIN/CREAT UR: 14 MG/G CREAT (ref 0–29)
ALBUMIN/GLOB SERPL: 1.7 G/DL
ALP SERPL-CCNC: 72 U/L (ref 39–117)
ALT SERPL-CCNC: 22 U/L (ref 1–33)
AST SERPL-CCNC: 18 U/L (ref 1–32)
BILIRUB SERPL-MCNC: 0.3 MG/DL (ref 0–1.2)
BUN SERPL-MCNC: 14 MG/DL (ref 8–23)
BUN/CREAT SERPL: 18.7 (ref 7–25)
CALCIUM SERPL-MCNC: 9.6 MG/DL (ref 8.6–10.5)
CHLORIDE SERPL-SCNC: 106 MMOL/L (ref 98–107)
CHOLEST SERPL-MCNC: 189 MG/DL (ref 0–200)
CO2 SERPL-SCNC: 26.2 MMOL/L (ref 22–29)
CREAT SERPL-MCNC: 0.75 MG/DL (ref 0.57–1)
CREAT UR-MCNC: 192.1 MG/DL
EGFRCR SERPLBLD CKD-EPI 2021: 83.7 ML/MIN/1.73
GLOBULIN SER CALC-MCNC: 2.7 GM/DL
GLUCOSE SERPL-MCNC: 111 MG/DL (ref 65–99)
HBA1C MFR BLD: 6.2 % (ref 4.8–5.6)
HDLC SERPL-MCNC: 46 MG/DL (ref 40–60)
IMP & REVIEW OF LAB RESULTS: NORMAL
LDLC SERPL CALC-MCNC: 108 MG/DL (ref 0–100)
MICROALBUMIN UR-MCNC: 27.5 UG/ML
POTASSIUM SERPL-SCNC: 5.2 MMOL/L (ref 3.5–5.2)
PROT SERPL-MCNC: 7.2 G/DL (ref 6–8.5)
SODIUM SERPL-SCNC: 143 MMOL/L (ref 136–145)
TRIGL SERPL-MCNC: 200 MG/DL (ref 0–150)
TSH SERPL DL<=0.005 MIU/L-ACNC: 0.97 UIU/ML (ref 0.27–4.2)
VIT B12 SERPL-MCNC: 1112 PG/ML (ref 211–946)
VLDLC SERPL CALC-MCNC: 35 MG/DL (ref 5–40)

## 2025-07-28 ENCOUNTER — OFFICE VISIT (OUTPATIENT)
Dept: ENDOCRINOLOGY | Age: 75
End: 2025-07-28
Payer: MEDICARE

## 2025-07-28 VITALS
DIASTOLIC BLOOD PRESSURE: 72 MMHG | SYSTOLIC BLOOD PRESSURE: 128 MMHG | HEART RATE: 66 BPM | WEIGHT: 196.2 LBS | BODY MASS INDEX: 32.69 KG/M2 | HEIGHT: 65 IN | TEMPERATURE: 98.2 F | OXYGEN SATURATION: 96 %

## 2025-07-28 DIAGNOSIS — I25.10 CORONARY ARTERY DISEASE INVOLVING NATIVE CORONARY ARTERY OF NATIVE HEART WITHOUT ANGINA PECTORIS: ICD-10-CM

## 2025-07-28 DIAGNOSIS — E11.42 TYPE 2 DIABETES MELLITUS WITH PERIPHERAL NEUROPATHY: ICD-10-CM

## 2025-07-28 DIAGNOSIS — E78.5 HYPERLIPIDEMIA, UNSPECIFIED HYPERLIPIDEMIA TYPE: ICD-10-CM

## 2025-07-28 DIAGNOSIS — Z87.39 HISTORY OF GOUT: ICD-10-CM

## 2025-07-28 DIAGNOSIS — I10 ESSENTIAL HYPERTENSION: ICD-10-CM

## 2025-07-28 DIAGNOSIS — E55.9 VITAMIN D DEFICIENCY: ICD-10-CM

## 2025-07-28 DIAGNOSIS — E03.9 PRIMARY HYPOTHYROIDISM: Primary | ICD-10-CM

## 2025-07-28 DIAGNOSIS — Z78.9 STATIN INTOLERANCE: ICD-10-CM

## 2025-07-28 PROCEDURE — 3074F SYST BP LT 130 MM HG: CPT | Performed by: INTERNAL MEDICINE

## 2025-07-28 PROCEDURE — 99214 OFFICE O/P EST MOD 30 MIN: CPT | Performed by: INTERNAL MEDICINE

## 2025-07-28 PROCEDURE — 3044F HG A1C LEVEL LT 7.0%: CPT | Performed by: INTERNAL MEDICINE

## 2025-07-28 PROCEDURE — 3078F DIAST BP <80 MM HG: CPT | Performed by: INTERNAL MEDICINE

## 2025-07-28 RX ORDER — LEVOTHYROXINE SODIUM 125 UG/1
125 TABLET ORAL DAILY
Qty: 90 TABLET | Refills: 1 | Status: SHIPPED | OUTPATIENT
Start: 2025-07-28

## 2025-07-28 NOTE — PROGRESS NOTES
Subjective   Jose Keys is a 75 y.o. female.     History of Present Illness     She has known diabetes mellitus since 2002.  She is on metformin  mg 1 tablet daily.  She has diarrhea when she takes a higher dose of metformin.  She does not check her blood sugars at home.  BS .  She has no weight change since August 2024.  She is exercising less due to illnesses in her family.  Hemoglobin A1c done in July 2025 is 6.2%.     Her last eye examination was in 2024.  She has no retinopathy.  Urine microalbumin was normal in 7/25.  She has numbness and tingling in her feet and is on gabapentin 300 mg twice a day prescriber Dr. Lau.  She was seen by a neurologist in the past.     She has hypothyroidism and is on Synthroid 125 mcg a day.  She has no previous history of head or neck radiation since treatment or thyroid surgery.  TSH done in July 2025 is normal at 0.975.  Her co-pay for brand-name Synthroid is higher this year.     She has on and off diarrhea over the past year.  She had a rectal polyp removed.  She has history of wheat allergy which causes itching.  She denies burning sensation when she swallows.  She is on Tums as needed. She had normal EGD and colonoscopy done by Dr. Kaiser in 2023     She has hyperlipidemia and is on Crestor 5 mg every evening and fish oil 1 cap/day.  She had muscle weakness on Crestor 10 mg/day.  She had muscle pain with atorvastatin, simvastatin and Zetia before.  She had leg edema with Praluent. She has not used Repatha or Leqvio before.  Her insurance will not cover Nexletol.    Lipid panel done in July 2025 as follows: Cholesterol 189.  HDL 46.  .  Triglycerides 200.     She has hypertension and is on Coreg 3.125 twice daily and amlodipine valsartan 10/320 mg daily.  She has no history of heart attack or stroke.  She denies chest pain.  She follows with Dr. Kammerling.     She had cardiac catheterization done by Dr. Duran on January 17, 2024 in preparation for  "bladder suspension surgery.  She has 30% stenosis of the right coronary artery.     She has history of vitamin D deficiency and in on multivitamins 1 tab daily.  She had a normal bone density in August 2021.  A 5 hydroxy vitamin D done in July 2025 is normal at 52.3 ng/mL.     She has history of gout and is off allopurinol 100 mg 1 tablet every other day.  She denies history of nephrolithiasis.    The following portions of the patient's history were reviewed and updated as appropriate: allergies, current medications, past family history, past medical history, past social history, past surgical history, and problem list.    Review of Systems   Eyes:  Negative for visual disturbance.   Respiratory:  Negative for shortness of breath.    Cardiovascular:  Negative for chest pain and palpitations.   Gastrointestinal: Negative.    Endocrine: Negative for cold intolerance and heat intolerance.   Genitourinary: Negative.    Musculoskeletal:  Negative for myalgias.   Neurological:  Negative for numbness.     Vitals:    07/28/25 1313   BP: 128/72   Pulse: 66   Temp: 98.2 °F (36.8 °C)   TempSrc: Oral   SpO2: 96%   Weight: 89 kg (196 lb 3.2 oz)   Height: 165.1 cm (65\")      Objective   Physical Exam  Constitutional:       General: She is not in acute distress.     Appearance: Normal appearance. She is not ill-appearing, toxic-appearing or diaphoretic.   Eyes:      General: No scleral icterus.        Right eye: No discharge.         Left eye: No discharge.      Extraocular Movements: Extraocular movements intact.   Neck:      Vascular: No carotid bruit.   Cardiovascular:      Rate and Rhythm: Normal rate and regular rhythm.      Heart sounds: Normal heart sounds. No murmur heard.     No friction rub.   Pulmonary:      Breath sounds: Normal breath sounds. No rales.   Chest:      Chest wall: No tenderness.   Abdominal:      General: Bowel sounds are normal.      Palpations: Abdomen is soft.      Tenderness: There is no right CVA " tenderness or left CVA tenderness.   Musculoskeletal:      Right lower leg: No edema.      Left lower leg: No edema.      Comments: Feet warm.  No cyanosis, pedal edema or clubbing.  No plantar ulcers.   Lymphadenopathy:      Cervical: No cervical adenopathy.   Neurological:      Mental Status: She is alert and oriented to person, place, and time.      Comments: Intact light touch       Lab on 07/25/2025   Component Date Value Ref Range Status    Glucose 07/25/2025 111 (H)  65 - 99 mg/dL Final    BUN 07/25/2025 14.0  8.0 - 23.0 mg/dL Final    Creatinine 07/25/2025 0.75  0.57 - 1.00 mg/dL Final    EGFR Result 07/25/2025 83.7  >60.0 mL/min/1.73 Final    Comment: GFR Categories in Chronic Kidney Disease (CKD)  GFR Category          GFR (mL/min/1.73)    Interpretation  G1                    90 or greater        Normal or high  (1)  G2                    60-89                Mild decrease  (1)  G3a                   45-59                Mild to moderate  decrease  G3b                   30-44                Moderate to  severe decrease  G4                    15-29                Severe decrease  G5                    14 or less           Kidney failure  (1)In the absence of evidence of kidney disease, neither  GFR category G1 or G2 fulfill the criteria for CKD.  eGFR calculation 2021 CKD-EPI creatinine equation, which  does not include race as a factor      BUN/Creatinine Ratio 07/25/2025 18.7  7.0 - 25.0 Final    Sodium 07/25/2025 143  136 - 145 mmol/L Final    Potassium 07/25/2025 5.2  3.5 - 5.2 mmol/L Final    Chloride 07/25/2025 106  98 - 107 mmol/L Final    Total CO2 07/25/2025 26.2  22.0 - 29.0 mmol/L Final    Calcium 07/25/2025 9.6  8.6 - 10.5 mg/dL Final    Total Protein 07/25/2025 7.2  6.0 - 8.5 g/dL Final    Albumin 07/25/2025 4.5  3.5 - 5.2 g/dL Final    Globulin 07/25/2025 2.7  gm/dL Final    A/G Ratio 07/25/2025 1.7  g/dL Final    Total Bilirubin 07/25/2025 0.3  0.0 - 1.2 mg/dL Final    Alkaline Phosphatase  07/25/2025 72  39 - 117 U/L Final    AST (SGOT) 07/25/2025 18  1 - 32 U/L Final    ALT (SGPT) 07/25/2025 22  1 - 33 U/L Final    Hemoglobin A1C 07/25/2025 6.20 (H)  4.80 - 5.60 % Final    Comment: Hemoglobin A1C Ranges:  Increased Risk for Diabetes  5.7% to 6.4%  Diabetes                     >= 6.5%  Diabetic Goal                < 7.0%      TSH 07/25/2025 0.975  0.270 - 4.200 uIU/mL Final    Creatinine, Urine 07/25/2025 192.1  Not Estab. mg/dL Final    Microalbumin, Urine 07/25/2025 27.5  Not Estab. ug/mL Final    Microalbumin/Creatinine Ratio 07/25/2025 14  0 - 29 mg/g creat Final    Comment:                        Normal:                0 -  29                         Moderately increased: 30 - 300                         Severely increased:       >300      Vitamin B-12 07/25/2025 1,112 (H)  211 - 946 pg/mL Final    Results may be falsely increased if patient taking Biotin.    Total Cholesterol 07/25/2025 189  0 - 200 mg/dL Final    Comment: Cholesterol Reference Ranges  (U.S. Department of Health and Human Services ATP III  Classifications)  Desirable          <200 mg/dL  Borderline High    200-239 mg/dL  High Risk          >240 mg/dL  Triglyceride Reference Ranges  (U.S. Department of Health and Human Services ATP III  Classifications)  Normal           <150 mg/dL  Borderline High  150-199 mg/dL  High             200-499 mg/dL  Very High        >500 mg/dL  HDL Reference Ranges  (U.S. Department of Health and Human Services ATP III  Classifications)  Low     <40 mg/dl (major risk factor for CHD)  High    >60 mg/dl ('negative' risk factor for CHD)  LDL Reference Ranges  (U.S. Department of Health and Human Services ATP III  Classifications)  Optimal          <100 mg/dL  Near Optimal     100-129 mg/dL  Borderline High  130-159 mg/dL  High             160-189 mg/dL  Very High        >189 mg/dL  LDL is calculated using the NIH LDL-C calculation.      Triglycerides 07/25/2025 200 (H)  0 - 150 mg/dL Final    HDL  Cholesterol 07/25/2025 46  40 - 60 mg/dL Final    VLDL Cholesterol Kyler 07/25/2025 35  5 - 40 mg/dL Final    LDL Chol Calc (NIH) 07/25/2025 108 (H)  0 - 100 mg/dL Final    25 Hydroxy, Vitamin D 07/25/2025 52.3  30.0 - 100.0 ng/ml Final    Comment: Reference Range for Total Vitamin D 25(OH)  Deficiency <20.0 ng/mL  Insufficiency 21-29 ng/mL  Sufficiency  ng/mL  Toxicity >100 ng/ml      Interpretation 07/25/2025 Note   Final    Supplemental report is available.     Assessment & Plan   Diagnoses and all orders for this visit:    1. Primary hypothyroidism (Primary)  -     levothyroxine (Synthroid) 125 MCG tablet; Take 1 tablet by mouth Daily.  Dispense: 90 tablet; Refill: 1  -     TSH Rfx On Abnormal To Free T4; Future    2. Type 2 diabetes mellitus with peripheral neuropathy  -     Comprehensive Metabolic Panel; Future  -     Hemoglobin A1c; Future  -     TSH Rfx On Abnormal To Free T4; Future  -     Lipid Panel; Future    3. Hyperlipidemia, unspecified hyperlipidemia type  -     TSH Rfx On Abnormal To Free T4; Future  -     Lipid Panel; Future    4. Essential hypertension  -     Comprehensive Metabolic Panel; Future    5. Coronary artery disease involving native coronary artery of native heart without angina pectoris    6. Vitamin D deficiency  -     Vitamin D,25-Hydroxy; Future    7. Statin intolerance  -     Lipid Panel; Future    8. History of gout      Continue metformin  mg 1 tablet daily.  Discussed about using Ozempic or Mounjaro.  Continue gabapentin per Dr. Lau.  Advised to have an eye examination yearly.    May switch to generic levothyroxine 125 mcg/day to reduce drug costs.    Continue gluten-free diet.  Follow-up with Dr. Kaiser as scheduled.    Continue Crestor 5 mg every evening and low-fat diet.  Continue fish oil 1 capsule daily.    Continue Coreg and amlodipine/valsartan per cardiologist.    Continue multivitamins 1 tablet daily.    Copy of my note sent to Dr. James Lau and   Awilda.    RTC 4 mos.

## (undated) DEVICE — IRRIGATOR BULB ASEPTO 60CC STRL

## (undated) DEVICE — NDL HYPO PRECISIONGLIDE REG 25G 1 1/2

## (undated) DEVICE — PCH INST SURG INVISISHIELD 2PCKT

## (undated) DEVICE — SYRINGE, LUER LOCK, 60ML: Brand: MEDLINE

## (undated) DEVICE — TTL1LYR 16FR10ML 100%SIL TMPST TR: Brand: MEDLINE

## (undated) DEVICE — SYR CONTRL PRESS/LO FIX/M/LL W/THMB/RNG 10ML

## (undated) DEVICE — TRAP FLD MINIVAC MEGADYNE 100ML

## (undated) DEVICE — BASIC SINGLE BASIN BTC-LF: Brand: MEDLINE INDUSTRIES, INC.

## (undated) DEVICE — DRAPE,UNDERBUTTOCKS,PCH,STERILE: Brand: MEDLINE

## (undated) DEVICE — CONN TBG Y 5 IN 1 LF STRL

## (undated) DEVICE — DEV CAPTURE SUT CAPIO/SLIM 11MM 25CM

## (undated) DEVICE — CATHETER,FOLEY,100%SILICONE,16FR,10ML,LF: Brand: MEDLINE

## (undated) DEVICE — SUT VIC 0 TIES 18IN J912G

## (undated) DEVICE — TOWEL,OR,DSP,ST,BLUE,STD,4/PK,20PK/CS: Brand: MEDLINE

## (undated) DEVICE — RETR STAY HK ELAS SHRP 5MM PK/8

## (undated) DEVICE — TUBING, SUCTION, 1/4" X 20', STRAIGHT: Brand: MEDLINE INDUSTRIES, INC.

## (undated) DEVICE — PK HYST VAG 40

## (undated) DEVICE — ANTIBACTERIAL UNDYED BRAIDED (POLYGLACTIN 910), SYNTHETIC ABSORBABLE SUTURE: Brand: COATED VICRYL

## (undated) DEVICE — ST IRR CYSTO W/SPK 77IN LF

## (undated) DEVICE — CONTN STRL 32OZ

## (undated) DEVICE — DRAPE,REIN 53X77,STERILE: Brand: MEDLINE

## (undated) DEVICE — CONTAINER,SPECIMEN,OR STERILE,4OZ: Brand: MEDLINE

## (undated) DEVICE — MAGNETIC INSTR DRAPE 20X16: Brand: MEDLINE INDUSTRIES, INC.

## (undated) DEVICE — DRAPE,TOWEL,LARGE,INVISISHIELD: Brand: MEDLINE

## (undated) DEVICE — SMOKE EVACUATION TUBING WITH 7/8 IN TO 1/4 IN REDUCER: Brand: BUFFALO FILTER

## (undated) DEVICE — PUNCH BIOP 2MM

## (undated) DEVICE — GLV SURG BIOGEL LTX PF 6 1/2

## (undated) DEVICE — SUT VIC 4/0 RB1 27IN J214H

## (undated) DEVICE — LEGGINGS, PAIR, 31X48, STERILE: Brand: MEDLINE

## (undated) DEVICE — RETR RNG GEN2 31.8X18.3CM

## (undated) DEVICE — SUT VIC 2/0 CT2 27IN J269H